# Patient Record
Sex: MALE | Employment: OTHER | URBAN - METROPOLITAN AREA
[De-identification: names, ages, dates, MRNs, and addresses within clinical notes are randomized per-mention and may not be internally consistent; named-entity substitution may affect disease eponyms.]

---

## 2017-01-10 DIAGNOSIS — I10 HYPERTENSION, ESSENTIAL, BENIGN: ICD-10-CM

## 2017-01-10 RX ORDER — METOPROLOL SUCCINATE 100 MG/1
100 TABLET, EXTENDED RELEASE ORAL
Qty: 30 TAB | Refills: 11 | Status: SHIPPED | OUTPATIENT
Start: 2017-01-10 | End: 2017-04-17 | Stop reason: ALTCHOICE

## 2017-01-10 NOTE — TELEPHONE ENCOUNTER
Per Gay:  Dr. Leesa Humphreys Refill  Received: Today       [address] [facility] Office Pool                     Patient needs a refill on his Metoprolol called to [address], [phone].  Contact is 21 [phone]

## 2017-03-03 ENCOUNTER — HOSPITAL ENCOUNTER (OUTPATIENT)
Dept: LAB | Age: 77
Discharge: HOME | End: 2017-03-03
Payer: MEDICARE

## 2017-03-03 ENCOUNTER — APPOINTMENT (OUTPATIENT)
Dept: INTERNAL MEDICINE CLINIC | Age: 77
End: 2017-03-03

## 2017-03-03 DIAGNOSIS — E78.2 HYPERLIPIDEMIA, MIXED: ICD-10-CM

## 2017-03-03 PROCEDURE — 80061 LIPID PANEL: CPT

## 2017-03-03 PROCEDURE — 36415 COLL VENOUS BLD VENIPUNCTURE: CPT

## 2017-03-03 PROCEDURE — 80053 COMPREHEN METABOLIC PANEL: CPT

## 2017-03-04 LAB
ALBUMIN SERPL-MCNC: 4.3 G/DL (ref 3.5–4.8)
ALBUMIN/GLOB SERPL: 1.9 {RATIO} (ref 1.1–2.5)
ALP SERPL-CCNC: 67 IU/L (ref 39–117)
ALT SERPL-CCNC: 28 IU/L (ref 0–44)
AST SERPL-CCNC: 27 IU/L (ref 0–40)
BILIRUB SERPL-MCNC: 0.9 MG/DL (ref 0–1.2)
BUN SERPL-MCNC: 12 MG/DL (ref 8–27)
BUN/CREAT SERPL: 11 (ref 10–22)
CALCIUM SERPL-MCNC: 9.3 MG/DL (ref 8.6–10.2)
CHLORIDE SERPL-SCNC: 101 MMOL/L (ref 96–106)
CHOLEST SERPL-MCNC: 143 MG/DL (ref 100–199)
CO2 SERPL-SCNC: 25 MMOL/L (ref 18–29)
CREAT SERPL-MCNC: 1.09 MG/DL (ref 0.76–1.27)
GLOBULIN SER CALC-MCNC: 2.3 G/DL (ref 1.5–4.5)
GLUCOSE SERPL-MCNC: 105 MG/DL (ref 65–99)
HDLC SERPL-MCNC: 49 MG/DL
INTERPRETATION, 910389: NORMAL
LDLC SERPL CALC-MCNC: 69 MG/DL (ref 0–99)
POTASSIUM SERPL-SCNC: 4 MMOL/L (ref 3.5–5.2)
PROT SERPL-MCNC: 6.6 G/DL (ref 6–8.5)
SODIUM SERPL-SCNC: 141 MMOL/L (ref 134–144)
TRIGL SERPL-MCNC: 127 MG/DL (ref 0–149)
VLDLC SERPL CALC-MCNC: 25 MG/DL (ref 5–40)

## 2017-03-09 PROBLEM — R73.02 GLUCOSE INTOLERANCE (IMPAIRED GLUCOSE TOLERANCE): Status: ACTIVE | Noted: 2017-03-09

## 2017-03-10 ENCOUNTER — OFFICE VISIT (OUTPATIENT)
Dept: INTERNAL MEDICINE CLINIC | Age: 77
End: 2017-03-10

## 2017-03-10 VITALS
TEMPERATURE: 98.7 F | OXYGEN SATURATION: 96 % | BODY MASS INDEX: 31.52 KG/M2 | HEIGHT: 68 IN | RESPIRATION RATE: 14 BRPM | WEIGHT: 208 LBS | SYSTOLIC BLOOD PRESSURE: 130 MMHG | HEART RATE: 65 BPM | DIASTOLIC BLOOD PRESSURE: 73 MMHG

## 2017-03-10 DIAGNOSIS — N18.30 BENIGN HYPERTENSION WITH CHRONIC KIDNEY DISEASE, STAGE III (HCC): Primary | ICD-10-CM

## 2017-03-10 DIAGNOSIS — I25.10 CORONARY ARTERY DISEASE INVOLVING NATIVE CORONARY ARTERY OF NATIVE HEART WITHOUT ANGINA PECTORIS: ICD-10-CM

## 2017-03-10 DIAGNOSIS — K21.9 GASTROESOPHAGEAL REFLUX DISEASE WITHOUT ESOPHAGITIS: ICD-10-CM

## 2017-03-10 DIAGNOSIS — Z71.89 ADVANCE DIRECTIVE DISCUSSED WITH PATIENT: ICD-10-CM

## 2017-03-10 DIAGNOSIS — R73.02 GLUCOSE INTOLERANCE (IMPAIRED GLUCOSE TOLERANCE): ICD-10-CM

## 2017-03-10 DIAGNOSIS — E78.2 HYPERLIPIDEMIA, MIXED: ICD-10-CM

## 2017-03-10 DIAGNOSIS — G47.62 NOCTURNAL LEG CRAMPS: ICD-10-CM

## 2017-03-10 DIAGNOSIS — I12.9 BENIGN HYPERTENSION WITH CHRONIC KIDNEY DISEASE, STAGE III (HCC): Primary | ICD-10-CM

## 2017-03-10 DIAGNOSIS — Z00.00 MEDICARE ANNUAL WELLNESS VISIT, SUBSEQUENT: ICD-10-CM

## 2017-03-10 DIAGNOSIS — M48.062 LUMBAR STENOSIS WITH NEUROGENIC CLAUDICATION: ICD-10-CM

## 2017-03-10 RX ORDER — RANITIDINE 150 MG/1
150 TABLET, FILM COATED ORAL 2 TIMES DAILY
Qty: 60 TAB | Refills: 5 | Status: SHIPPED | OUTPATIENT
Start: 2017-03-10 | End: 2017-05-25

## 2017-03-10 RX ORDER — GABAPENTIN 300 MG/1
CAPSULE ORAL
Refills: 0 | COMMUNITY
Start: 2017-02-15 | End: 2017-03-10 | Stop reason: SDUPTHER

## 2017-03-10 RX ORDER — GABAPENTIN 300 MG/1
300 CAPSULE ORAL 3 TIMES DAILY
Qty: 90 CAP | Refills: 5 | Status: SHIPPED | OUTPATIENT
Start: 2017-03-10 | End: 2018-07-12

## 2017-03-10 RX ORDER — GUAIFENESIN 100 MG/5ML
81 LIQUID (ML) ORAL
Qty: 90 TAB | Refills: 3
Start: 2017-03-10 | End: 2022-07-12

## 2017-03-10 NOTE — PATIENT INSTRUCTIONS
Increase gabapentin to twice a day for 5 days, then 3 times a day. We can increase further if needed. Try to stretch 3 times a day as per illustration   Take Vitamin B6 30 mg 3 times a day for leg cramps  Start ranitidine 150 mg twice daily for nausea and hearburn  Return in one month          Gastroesophageal Reflux Disease (GERD): Care Instructions  Your Care Instructions    Gastroesophageal reflux disease (GERD) is the backward flow of stomach acid into the esophagus. The esophagus is the tube that leads from your throat to your stomach. A one-way valve prevents the stomach acid from moving up into this tube. When you have GERD, this valve does not close tightly enough. If you have mild GERD symptoms including heartburn, you may be able to control the problem with antacids or over-the-counter medicine. Changing your diet, losing weight, and making other lifestyle changes can also help reduce symptoms. Follow-up care is a key part of your treatment and safety. Be sure to make and go to all appointments, and call your doctor if you are having problems. Its also a good idea to know your test results and keep a list of the medicines you take. How can you care for yourself at home? · Take your medicines exactly as prescribed. Call your doctor if you think you are having a problem with your medicine. · Your doctor may recommend over-the-counter medicine. For mild or occasional indigestion, antacids, such as Tums, Gaviscon, Mylanta, or Maalox, may help. Your doctor also may recommend over-the-counter acid reducers, such as Pepcid AC, Tagamet HB, Zantac 75, or Prilosec. Read and follow all instructions on the label. If you use these medicines often, talk with your doctor. · Change your eating habits. ¨ Its best to eat several small meals instead of two or three large meals. ¨ After you eat, wait 2 to 3 hours before you lie down. ¨ Chocolate, mint, and alcohol can make GERD worse.   ¨ Spicy foods, foods that have a lot of acid (like tomatoes and oranges), and coffee can make GERD symptoms worse in some people. If your symptoms are worse after you eat a certain food, you may want to stop eating that food to see if your symptoms get better. · Do not smoke or chew tobacco. Smoking can make GERD worse. If you need help quitting, talk to your doctor about stop-smoking programs and medicines. These can increase your chances of quitting for good. · If you have GERD symptoms at night, raise the head of your bed 6 to 8 inches by putting the frame on blocks or placing a foam wedge under the head of your mattress. (Adding extra pillows does not work.)  · Do not wear tight clothing around your middle. · Lose weight if you need to. Losing just 5 to 10 pounds can help. When should you call for help? Call your doctor now or seek immediate medical care if:  · You have new or different belly pain. · Your stools are black and tarlike or have streaks of blood. Watch closely for changes in your health, and be sure to contact your doctor if:  · Your symptoms have not improved after 2 days. · Food seems to catch in your throat or chest.  Where can you learn more? Go to http://bridget-primo.info/. Enter E257 in the search box to learn more about \"Gastroesophageal Reflux Disease (GERD): Care Instructions. \"  Current as of: August 9, 2016  Content Version: 11.1  © 1246-1266 [employer]. Care instructions adapted under license by [employer] (which disclaims liability or warranty for this information). If you have questions about a medical condition or this instruction, always ask your healthcare professional. [address] any warranty or liability for your use of this information.

## 2017-03-10 NOTE — PROGRESS NOTES
This is a Subsequent Medicare Annual Wellness Visit providing Personalized Prevention Plan Services (PPPS) (Performed 12 months after initial AWV and PPPS )    I have reviewed the patient's medical history in detail and updated the computerized patient record. History     Past Medical History:   Diagnosis Date    Arrhythmia     atrial fibrillation    Arthritis     CAD (coronary artery disease) 12/24/2013    Cataract     Diverticulosis 9/22/2009    Dyslipidemia 9/22/2009    Heart failure ([address] 12/2013    MILD HEART ATTACK     Hemorrhoids 9/22/2009    HTN 9/22/2009    Hypercholesterolemia     Impotence 9/22/2009    Neuropathy 9/22/2009    Open angle glaucoma with borderline intraocular pressure     Vitamin D deficiency 9/22/2009      Past Surgical History:   Procedure Laterality Date    CARDIAC SURG PROCEDURE UNLIST 2013    triple bypass december 2013    ENDOSCOPY, COLON, DIAGNOSTIC  [id number]    HX HERNIA REPAIR      HX LITHOTRIPSY  [id number]    HX ORTHOPAEDIC      back surgery     Current Outpatient Prescriptions   Medication Sig Dispense Refill    aspirin 81 mg chewable tablet Take 1 Tab by mouth nightly. 90 Tab 3    gabapentin (NEURONTIN) 300 mg capsule Take 1 Cap by mouth three (3) times daily. 90 Cap 5    raNITIdine (ZANTAC) 150 mg tablet Take 1 Tab by mouth two (2) times a day. 60 Tab 5    metoprolol succinate (TOPROL-XL) 100 mg tablet Take 1 Tab by mouth nightly. 30 Tab 11    amLODIPine (NORVASC) 5 mg tablet Take 1 Tab by mouth daily. Indications: Hypertension 30 Tab 11    cetirizine (ZYRTEC) 10 mg tablet Take 1 Tab by mouth daily. 20 Tab 0    cholecalciferol (VITAMIN D3) 1,000 unit cap Take  by mouth nightly.       lisinopril-hydrochlorothiazide (PRINZIDE, ZESTORETIC) 20-12.5 mg per tablet TAKE TWO TABLETS ONCE DAILY (Patient taking differently: TAKE TWO TABLETS ONCE DAILY AT HS) 60 Tab 11    atorvastatin (LIPITOR) 80 mg tablet TAKE ONE TABLET ONCE DAILY (Patient taking differently: TAKE ONE TABLET ONCE DAILY AT HS) 30 Tab 11    acetaminophen (TYLENOL EXTRA STRENGTH) 500 mg tablet Take  by mouth every six (6) hours as needed for Pain. Allergies   Allergen Reactions    Percocet [Oxycodone-Acetaminophen] Other (comments)     \"feel weird\"     Family History   Problem Relation Age of Onset    Hypertension Mother     Diabetes Mother     Cancer Father      LIVER    Alcohol abuse Brother     Diabetes Sister     Hypertension Sister     Arthritis-osteo Sister     Kidney Disease Sister      DIALYSIS    Alcohol abuse Brother     Suicide Brother     No Known Problems Brother     No Known Problems Brother     No Known Problems Brother     Arthritis-osteo Brother     Hypertension Son     Hypertension Son     Anesth Problems Neg Hx      Social History   Substance Use Topics    Smoking status: Never Smoker    Smokeless tobacco: Never Used    Alcohol use No     Patient Active Problem List   Diagnosis Code    Hyperlipidemia, mixed E78.2    Impotence N52.9    Diverticulosis K57.90    Hemorrhoids K64.9    Vitamin D deficiency E55.9    Benign hypertension with chronic kidney disease, stage III I12.9, N18.3    GERD (gastroesophageal reflux disease) K21.9    Intermittent angle-closure glaucoma H40.239    CAD (coronary artery disease), native coronary artery I25.10    Nephrolithiasis N20.0    Left median nerve neuropathy G56.12    CKD (chronic kidney disease) stage 3, GFR 30-59 ml/min N18.3    Advance directive discussed with patient Z71.89    Lumbar stenosis with neurogenic claudication M48.06    Unequal blood pressure in upper extremities R09.89    Glucose intolerance (impaired glucose tolerance) R73.02       Depression Risk Factor Screening:     PHQ 2 / 9, over the last two weeks 9/9/2016   Little interest or pleasure in doing things Not at all   Feeling down, depressed or hopeless Not at all   Total Score PHQ 2 0     Alcohol Risk Factor Screening:    On any occasion during the past 3 months, have you had more than 4 drinks containing alcohol? No    Do you average more than 14 drinks per week? No      Functional Ability and Level of Safety:     Hearing Loss   normal-to-mild    Activities of Daily Living   Self-care. Requires assistance with: no ADLs    Fall Risk     Fall Risk Assessment, last 12 mths 9/9/2016   Able to walk? Yes   Fall in past 12 months? Yes   Fall with injury? No   Number of falls in past 12 months 2   Fall Risk Score 2     Abuse Screen   Patient is not abused    Review of Systems   Not required    Physical Examination     Evaluation of Cognitive Function:  Mood/affect:  neutral, happy  Appearance: age appropriate, casually dressed and within normal Limits  Family member/caregiver input: none  Cognition and memory appear normal.       Patient Care Team:  Ivonne Rosenthal MD as PCP - General (Internal Medicine)  Yin Floyd RN as Nurse Navigator    Advice/Referrals/Counseling   Education and counseling provided:  End-of-Life planning (with patient's consent)      Assessment/Plan   See other note this date.

## 2017-03-10 NOTE — PROGRESS NOTES
HISTORY OF PRESENT ILLNESS  Donna Terry is a 67 y.o. male. HPI  He presents for follow up of hypertension, hyperlipidemia, coronary artery disease, history of prior MI, status post CABG, chronic kidney disease and glucose intolerance. Diet and Lifestyle: generally follows a low fat low cholesterol diet, generally follows a low sodium diet, does not rigorously follow a diabetic diet, sedentary, nonsmoker   Medication compliance: compliant most of the time  Medication side effects: none  Home BP Monitoring: is not measured at home. Cardiovascular ROS:  He complains of claudication. He denies palpitations, orthopnea, exertional chest pressure/discomfort, claudication, lower extremity edema, dyspnea on exertion, dizziness     Sense of spinning or off balance associated  with nausea triggered by posiiton change. Each episode lasst 2-3 minutes. He complains of gastroesophageal reflux. He is currently not treating this. Current symptoms include heartburn and regurgitation of undigested food. He denies dysphagia, has not lost weight. Back pain pain into both thighs which is described as aching. I occurs when he is up on feet. At worst it is  5/10 intensity. It is relieved by sitting. The severe pain radiating into leg is improved since surgery. He is disappointed that he still has pain. Gabapentin once a day so far not helping after taking it for a month.      Patient Active Problem List   Diagnosis Code    Hyperlipidemia, mixed E78.2    Impotence N52.9    Diverticulosis K57.90    Hemorrhoids K64.9    Vitamin D deficiency E55.9    Benign hypertension with chronic kidney disease, stage III I12.9, N18.3    GERD (gastroesophageal reflux disease) K21.9    Intermittent angle-closure glaucoma H40.239    CAD (coronary artery disease), native coronary artery I25.10    Nephrolithiasis N20.0    Left median nerve neuropathy G56.12    CKD (chronic kidney disease) stage 3, GFR 30-59 ml/min N18.3    Advance directive discussed with patient Z71.89    Lumbar stenosis with neurogenic claudication M48.06    Unequal blood pressure in upper extremities R09.89    Glucose intolerance (impaired glucose tolerance) R73.02     Past Medical History:   Diagnosis Date    Arrhythmia     atrial fibrillation    Arthritis     CAD (coronary artery disease) 12/24/2013    Cataract     Diverticulosis 9/22/2009    Dyslipidemia 9/22/2009    Heart failure ([address] 12/2013    MILD HEART ATTACK     Hemorrhoids 9/22/2009    HTN 9/22/2009    Hypercholesterolemia     Impotence 9/22/2009    Neuropathy 9/22/2009    Open angle glaucoma with borderline intraocular pressure     Vitamin D deficiency 9/22/2009     Past Surgical History:   Procedure Laterality Date    CARDIAC SURG PROCEDURE UNLIST 2013    triple bypass december 2013    ENDOSCOPY, COLON, DIAGNOSTIC  [id number]    HX HERNIA REPAIR      HX LITHOTRIPSY  [id number]    HX ORTHOPAEDIC      back surgery     Social History     Social History    Marital status:      Spouse name: N/A    Number of children: N/A    Years of education: N/A     Social History Main Topics    Smoking status: Never Smoker    Smokeless tobacco: Never Used    Alcohol use No    Drug use: No    Sexual activity: Yes     Partners: Female     Other Topics Concern    None     Social History Narrative     Family History   Problem Relation Age of Onset    Hypertension Mother     Diabetes Mother     Cancer Father      LIVER    Alcohol abuse Brother     Diabetes Sister     Hypertension Sister     Arthritis-osteo Sister     Kidney Disease Sister      DIALYSIS    Alcohol abuse Brother     Suicide Brother     No Known Problems Brother     No Known Problems Brother     No Known Problems Brother     Arthritis-osteo Brother     Hypertension Son    [facility] Hypertension Son     Anesth Problems Neg Hx      Allergies   Allergen Reactions    Percocet [Oxycodone-Acetaminophen] Other (comments) \"feel weird\"     Current Outpatient Prescriptions   Medication Sig Dispense Refill    gabapentin (NEURONTIN) 300 mg capsule TAKE 1 TO 2 CAPSULES BY MOUTH AT BEDTIME  0    metoprolol succinate (TOPROL-XL) 100 mg tablet Take 1 Tab by mouth nightly. 30 Tab 11    amLODIPine (NORVASC) 5 mg tablet Take 1 Tab by mouth daily. Indications: Hypertension 30 Tab 11    cetirizine (ZYRTEC) 10 mg tablet Take 1 Tab by mouth daily. 20 Tab 0    cholecalciferol (VITAMIN D3) 1,000 unit cap Take  by mouth nightly.  lisinopril-hydrochlorothiazide (PRINZIDE, ZESTORETIC) 20-12.5 mg per tablet TAKE TWO TABLETS ONCE DAILY (Patient taking differently: TAKE TWO TABLETS ONCE DAILY AT HS) 60 Tab 11    atorvastatin (LIPITOR) 80 mg tablet TAKE ONE TABLET ONCE DAILY (Patient taking differently: TAKE ONE TABLET ONCE DAILY AT HS) 30 Tab 11    acetaminophen (TYLENOL EXTRA STRENGTH) 500 mg tablet Take  by mouth every six (6) hours as needed for Pain.  aspirin 81 mg chewable tablet Take 81 mg by mouth nightly. Review of Systems   Constitutional: Negative for malaise/fatigue and weight loss. Gastrointestinal: Positive for heartburn. Negative for constipation and diarrhea. Musculoskeletal: Negative for back pain and joint pain. Neurological: Negative for dizziness, tingling and focal weakness. Visit Vitals    /73 (BP 1 Location: Left arm, BP Patient Position: Sitting)    Pulse 65    Temp 98.7 °F (37.1 °C) (Oral)    Resp 14    Ht 5' 8\" (1.727 m)    Wt 208 lb (94.3 kg)    SpO2 96%    BMI 31.63 kg/m2     Physical Exam   Constitutional: He is oriented to person, place, and time. He appears well-developed and well-nourished. HENT:   Head: Normocephalic and atraumatic. Eyes: Conjunctivae are normal. Pupils are equal, round, and reactive to light. Neck: Neck supple. Carotid bruit is not present. No thyromegaly present. Cardiovascular: Normal rate, regular rhythm and normal heart sounds.   PMI is not displaced. Exam reveals no gallop. No murmur heard. Pulses:       Dorsalis pedis pulses are 2+ on the right side, and 2+ on the left side. Posterior tibial pulses are 2+ on the right side, and 2+ on the left side. Pulmonary/Chest: Effort normal. He has no wheezes. He has no rhonchi. He has no rales. Abdominal: Soft. Normal appearance. He exhibits no abdominal bruit and no mass. There is no hepatosplenomegaly. There is no tenderness. Musculoskeletal: He exhibits edema (trace bilaterally). Negative SLR bilaterally   Lymphadenopathy:     He has no cervical adenopathy. Right: No supraclavicular adenopathy present. Left: No inguinal and no supraclavicular adenopathy present. Neurological: He is alert and oriented to person, place, and time. No sensory deficit. Skin: Skin is warm, dry and intact. No rash noted. Psychiatric: He has a normal mood and affect. His behavior is normal.   Nursing note and vitals reviewed. Results for orders placed or performed in visit on 41/25/76   METABOLIC PANEL, COMPREHENSIVE   Result Value Ref Range    Glucose 105 (H) 65 - 99 mg/dL    BUN 12 8 - 27 mg/dL    Creatinine 1.09 0.76 - 1.27 mg/dL    GFR est non-AA 65 >59 mL/min/1.73    GFR est AA 75 >59 mL/min/1.73    BUN/Creatinine ratio 11 10 - 22    Sodium 141 134 - 144 mmol/L    Potassium 4.0 3.5 - 5.2 mmol/L    Chloride 101 96 - 106 mmol/L    CO2 25 18 - 29 mmol/L    Calcium 9.3 8.6 - 10.2 mg/dL    Protein, total 6.6 6.0 - 8.5 g/dL    Albumin 4.3 3.5 - 4.8 g/dL    GLOBULIN, TOTAL 2.3 1.5 - 4.5 g/dL    A-G Ratio 1.9 1.1 - 2.5    Bilirubin, total 0.9 0.0 - 1.2 mg/dL    Alk.  phosphatase 67 39 - 117 IU/L    AST (SGOT) 27 0 - 40 IU/L    ALT (SGPT) 28 0 - 44 IU/L   LIPID PANEL   Result Value Ref Range    Cholesterol, total 143 100 - 199 mg/dL    Triglyceride 127 0 - 149 mg/dL    HDL Cholesterol 49 >39 mg/dL    VLDL, calculated 25 5 - 40 mg/dL    LDL, calculated 69 0 - 99 mg/dL   CVD REPORT   Result Value Ref Range    INTERPRETATION Note        ASSESSMENT and PLAN    ICD-10-CM ICD-9-CM    1. Benign hypertension with chronic kidney disease, stage III I12.9 403.10     N18.3 585.3    2. Hyperlipidemia, mixed E78.2 272.2    3. Gastroesophageal reflux disease without esophagitis K21.9 530.81    4. Coronary artery disease involving native coronary artery of native heart without angina pectoris I25.10 414.01 aspirin 81 mg chewable tablet   5. Glucose intolerance (impaired glucose tolerance) R73.02 790.22    6. Lumbar stenosis with neurogenic claudication M48.06 724.03 gabapentin (NEURONTIN) 300 mg capsule   7. Nocturnal leg cramps G47.62 327.52    8. Medicare annual wellness visit, subsequent Z00.00 V70.0    9. Advance directive discussed with patient Z71.89 V65.49        Benign hypertension with chronic kidney disease, stage III  Blood pressure is controlled and creatinine is stable. Hyperlipidemia, mixed  Hyperlipidemia is controlled    Gastroesophageal reflux disease without esophagitis         -     raNITIdine (ZANTAC) 150 mg tablet; Take 1 Tab by mouth two (2)        times a day. Coronary artery disease involving native coronary artery of native heart without angina pectoris  -     aspirin 81 mg chewable tablet; Take 1 Tab by mouth nightly. Glucose intolerance (impaired glucose tolerance)    Lumbar stenosis with neurogenic claudication  -     gabapentin (NEURONTIN) 300 mg capsule; Take 1 Cap by mouth three (3) times daily. Nocturnal leg cramps  Try to stretch 3 times a day as per instructions  Take Vitamin B6 30 mg 3 times a day for leg cramps          Medicare annual wellness visit, subsequent    Advance directive discussed with patient        Follow-up Disposition:  Return in about 1 month (around 4/10/2017) for GERD, back pain, nausea/dizziness.   lab results and schedule of future lab studies reviewed with patient  reviewed diet, exercise and weight control  cardiovascular risk and specific lipid/LDL goals reviewed  Discussed the patient's BMI with him. The BMI follow up plan is as follows: I have counseled this patient on diet and exercise regimens  I have discussed the diagnosis with the patient and the intended plan as seen in the above orders. Patient is in agreement. The patient has received an after-visit summary and questions were answered concerning future plans. I have discussed medication side effects and warnings with the patient as well.

## 2017-03-10 NOTE — PROGRESS NOTES
Reviewed record In preparation for visit and have obtained necessary documentation. Has info on advanced directive but has not filled them out. 1. Have you been to the ER, urgent care clinic or hospitalized since your last visit? [facility] 9/9/16, [facility] ER 11/15/16    2. Have you seen or consulted any other health care providers outside of the [address] [place] [address] since your last visit? Include any pap smears or colon screening.  Neurosurgeon     [facility] Maintenance reviewed:

## 2017-03-10 NOTE — MR AVS SNAPSHOT
Visit Information Date & Time Provider Department Dept. Phone Encounter #  
 3/10/2017  8:45 AM Ivonne Rosenthal MD [address] [place] [address] [phone] [id number] Follow-up Instructions Return in about 1 month (around 4/10/2017) for GERD, back pain, nausea/dizziness. Upcoming Health Maintenance Date Due DTaP/Tdap/Td series (1 - Tdap) 9/6/1960 ZOSTER VACCINE AGE 60> 9/6/1999 MEDICARE YEARLY EXAM 1/14/2017 GLAUCOMA SCREENING Q2Y 6/26/2017 COLONOSCOPY 1/1/2018 Allergies as of 3/10/2017  Review Complete On: 3/10/2017 By: Ivonne Rosenthal MD  
  
 Severity Noted Reaction Type Reactions Percocet [Oxycodone-acetaminophen]  09/22/2009    Other (comments) \"feel weird\" Current Immunizations  Reviewed on 3/10/2017 Name Date Influenza High Dose Vaccine PF 9/14/2015 Influenza Vaccine (Quad) PF 9/9/2016 Pneumococcal Conjugate (PCV-13) 3/12/2015 Pneumococcal Vaccine (Unspecified Type) 4/22/2010 Reviewed by Kenneth Ormond, LPN on 9/62/7754 at  8:49 AM  
You Were Diagnosed With   
  
 Codes Comments Benign hypertension with chronic kidney disease, stage III    -  Primary ICD-10-CM: I12.9, N18.3 ICD-9-CM: 403.10, 585.3 Hyperlipidemia, mixed     ICD-10-CM: E78.2 ICD-9-CM: 272.2 Coronary artery disease involving native coronary artery of native heart without angina pectoris     ICD-10-CM: I25.10 ICD-9-CM: 414.01 Glucose intolerance (impaired glucose tolerance)     ICD-10-CM: R73.02 
ICD-9-CM: 790.22 Lumbar stenosis with neurogenic claudication     ICD-10-CM: M48.06 
ICD-9-CM: 724.03 Nocturnal leg cramps     ICD-10-CM: H36.08 
ICD-9-CM: 327.52 Gastroesophageal reflux disease without esophagitis     ICD-10-CM: K21.9 ICD-9-CM: 530.81 Medicare annual wellness visit, subsequent     ICD-10-CM: Z00.00 ICD-9-CM: V70.0 Advance directive discussed with patient     ICD-10-CM: Z71.89 ICD-9-CM: V65.49 Vitals BP Pulse Temp Resp Height(growth percentile) Weight(growth percentile) 130/73 (BP 1 Location: Left arm, BP Patient Position: Sitting) 65 98.7 °F (37.1 °C) (Oral) 14 5' 8\" (1.727 m) 208 lb (94.3 kg) SpO2 BMI Smoking Status 96% 31.63 kg/m2 Never Smoker BMI and BSA Data Body Mass Index Body Surface Area  
 31.63 kg/m 2 2.13 m 2 Preferred Pharmacy Pharmacy Name Phone [facility] - Radha GIRARD [phone] Your Updated Medication List  
  
   
This list is accurate as of: 3/10/17  9:38 AM.  Always use your most recent med list. amLODIPine 5 mg tablet Commonly known as:  Rudene Post Take 1 Tab by mouth daily. Indications: Hypertension  
  
 aspirin 81 mg chewable tablet Take 1 Tab by mouth nightly. atorvastatin 80 mg tablet Commonly known as:  LIPITOR  
TAKE ONE TABLET ONCE DAILY  
  
 cetirizine 10 mg tablet Commonly known as:  ZyrTEC Take 1 Tab by mouth daily. cholecalciferol 1,000 unit Cap Commonly known as:  VITAMIN D3 Take  by mouth nightly.  
  
 gabapentin 300 mg capsule Commonly known as:  NEURONTIN Take 1 Cap by mouth three (3) times daily. lisinopril-hydroCHLOROthiazide 20-12.5 mg per tablet Commonly known as:  PRINZIDE, ZESTORETIC  
TAKE TWO TABLETS ONCE DAILY  
  
 metoprolol succinate 100 mg tablet Commonly known as:  TOPROL-XL Take 1 Tab by mouth nightly. raNITIdine 150 mg tablet Commonly known as:  ZANTAC Take 1 Tab by mouth two (2) times a day. TYLENOL EXTRA STRENGTH 500 mg tablet Generic drug:  acetaminophen Take  by mouth every six (6) hours as needed for Pain. Prescriptions Sent to Pharmacy Refills  
 gabapentin (NEURONTIN) 300 mg capsule 5 Sig: Take 1 Cap by mouth three (3) times daily. Class: Normal  
 Pharmacy: [address] [place] [address], Radha Rene Ph #: [phone] Route: Oral  
 raNITIdine (ZANTAC) 150 mg tablet 5 Sig: Take 1 Tab by mouth two (2) times a day. Class: Normal  
 Pharmacy: [address] [place] [address] Adriano Rene  #: [phone] Route: Oral  
  
Follow-up Instructions Return in about 1 month (around 4/10/2017) for GERD, back pain, nausea/dizziness. Patient Instructions Increase gabapentin to twice a day for 5 days, then 3 times a day. We can increase further if needed. Try to stretch 3 times a day as per illustration Take Vitamin B6 30 mg 3 times a day for leg cramps Start ranitidine 150 mg twice daily for nausea and hearburn Return in one month Gastroesophageal Reflux Disease (GERD): Care Instructions Your Care Instructions Gastroesophageal reflux disease (GERD) is the backward flow of stomach acid into the esophagus. The esophagus is the tube that leads from your throat to your stomach. A one-way valve prevents the stomach acid from moving up into this tube. When you have GERD, this valve does not close tightly enough. If you have mild GERD symptoms including heartburn, you may be able to control the problem with antacids or over-the-counter medicine. Changing your diet, losing weight, and making other lifestyle changes can also help reduce symptoms. Follow-up care is a key part of your treatment and safety. Be sure to make and go to all appointments, and call your doctor if you are having problems. Its also a good idea to know your test results and keep a list of the medicines you take. How can you care for yourself at home? · Take your medicines exactly as prescribed. Call your doctor if you think you are having a problem with your medicine. · Your doctor may recommend over-the-counter medicine. For mild or occasional indigestion, antacids, such as Tums, Gaviscon, Mylanta, or Maalox, may help.  Your doctor also may recommend over-the-counter acid reducers, such as Pepcid AC, Tagamet HB, Zantac 75, or Prilosec. Read and follow all instructions on the label. If you use these medicines often, talk with your doctor. · Change your eating habits. ¨ Its best to eat several small meals instead of two or three large meals. ¨ After you eat, wait 2 to 3 hours before you lie down. ¨ Chocolate, mint, and alcohol can make GERD worse. ¨ Spicy foods, foods that have a lot of acid (like tomatoes and oranges), and coffee can make GERD symptoms worse in some people. If your symptoms are worse after you eat a certain food, you may want to stop eating that food to see if your symptoms get better. · Do not smoke or chew tobacco. Smoking can make GERD worse. If you need help quitting, talk to your doctor about stop-smoking programs and medicines. These can increase your chances of quitting for good. · If you have GERD symptoms at night, raise the head of your bed 6 to 8 inches by putting the frame on blocks or placing a foam wedge under the head of your mattress. (Adding extra pillows does not work.) · Do not wear tight clothing around your middle. · Lose weight if you need to. Losing just 5 to 10 pounds can help. When should you call for help? Call your doctor now or seek immediate medical care if: 
· You have new or different belly pain. · Your stools are black and tarlike or have streaks of blood. Watch closely for changes in your health, and be sure to contact your doctor if: 
· Your symptoms have not improved after 2 days. · Food seems to catch in your throat or chest. 
Where can you learn more? Go to http://bridget-primo.info/. Enter H004 in the search box to learn more about \"Gastroesophageal Reflux Disease (GERD): Care Instructions. \" Current as of: August 9, 2016 Content Version: 11.1 © 2421-9638 [employer].  Care instructions adapted under license by [employer] (which disclaims liability or warranty for this information). If you have questions about a medical condition or this instruction, always ask your healthcare professional. [address] any warranty or liability for your use of this information. Introducing [facility] SERVICES! [employer] introduces [employer] patient portal. Now you can access parts of your medical record, email your doctor's office, and request medication refills online. 1. In your internet browser, go to [email or web address] 2. Click on the First Time User? Click Here link in the Sign In box. You will see the New Member Sign Up page. 3. Enter your [employer] Access Code exactly as it appears below. You will not need to use this code after youve completed the sign-up process. If you do not sign up before the expiration date, you must request a new code. · [employer] Access Code: [email or web address] Expires: 6/8/2017  9:31 AM 
 
4. Enter the last four digits of your Social Security Number (xxxx) and Date of Birth (mm/dd/yyyy) as indicated and click Submit. You will be taken to the next sign-up page. 5. Create a [employer] ID. This will be your [employer] login ID and cannot be changed, so think of one that is secure and easy to remember. 6. Create a [employer] password. You can change your password at any time. 7. Enter your Password Reset Question and Answer. This can be used at a later time if you forget your password. 8. Enter your e-mail address. You will receive e-mail notification when new information is available in [address] 9. Click Sign Up. You can now view and download portions of your medical record. 10. Click the Download Summary menu link to download a portable copy of your medical information. If you have questions, please visit the Frequently Asked Questions section of the [employer] website. Remember, [employer] is NOT to be used for urgent needs. For medical emergencies, dial 911. Now available from your iPhone and Android! Please provide this summary of care documentation to your next provider. Your primary care clinician is listed as Radha Weber. If you have any questions after today's visit, please call [phone].

## 2017-04-17 ENCOUNTER — OFFICE VISIT (OUTPATIENT)
Dept: INTERNAL MEDICINE CLINIC | Age: 77
End: 2017-04-17

## 2017-04-17 VITALS
DIASTOLIC BLOOD PRESSURE: 82 MMHG | HEIGHT: 68 IN | HEART RATE: 56 BPM | WEIGHT: 210.6 LBS | SYSTOLIC BLOOD PRESSURE: 144 MMHG | TEMPERATURE: 97.7 F | BODY MASS INDEX: 31.92 KG/M2 | OXYGEN SATURATION: 96 % | RESPIRATION RATE: 18 BRPM

## 2017-04-17 DIAGNOSIS — I12.9 BENIGN HYPERTENSION WITH CHRONIC KIDNEY DISEASE, STAGE III (HCC): ICD-10-CM

## 2017-04-17 DIAGNOSIS — R73.02 GLUCOSE INTOLERANCE (IMPAIRED GLUCOSE TOLERANCE): Primary | ICD-10-CM

## 2017-04-17 DIAGNOSIS — K21.9 GASTROESOPHAGEAL REFLUX DISEASE WITHOUT ESOPHAGITIS: ICD-10-CM

## 2017-04-17 DIAGNOSIS — M48.062 LUMBAR STENOSIS WITH NEUROGENIC CLAUDICATION: ICD-10-CM

## 2017-04-17 DIAGNOSIS — N18.30 BENIGN HYPERTENSION WITH CHRONIC KIDNEY DISEASE, STAGE III (HCC): ICD-10-CM

## 2017-04-17 LAB — HBA1C MFR BLD HPLC: 5.5 %

## 2017-04-17 RX ORDER — DULOXETIN HYDROCHLORIDE 30 MG/1
30 CAPSULE, DELAYED RELEASE ORAL DAILY
Qty: 10 CAP | Refills: 0 | Status: SHIPPED | OUTPATIENT
Start: 2017-04-17 | End: 2017-04-20 | Stop reason: SDUPTHER

## 2017-04-17 RX ORDER — CARVEDILOL 12.5 MG/1
12.5 TABLET ORAL 2 TIMES DAILY WITH MEALS
Qty: 60 TAB | Refills: 5 | Status: SHIPPED | OUTPATIENT
Start: 2017-04-17 | End: 2017-04-20 | Stop reason: SDUPTHER

## 2017-04-17 RX ORDER — DULOXETIN HYDROCHLORIDE 60 MG/1
60 CAPSULE, DELAYED RELEASE ORAL DAILY
Qty: 30 CAP | Refills: 5 | Status: SHIPPED | OUTPATIENT
Start: 2017-04-17 | End: 2017-04-20 | Stop reason: SDUPTHER

## 2017-04-17 NOTE — PROGRESS NOTES
HISTORY OF PRESENT ILLNESS  Migel Roy is a 67 y.o. male. HPI  He presents for follow up of gastroesophageal reflux. He is currently treating this with ranitidine. He notes resolution of symptoms since he began taking it. Anna Snooks He denies dysphagia, has not lost weight, denies heartburn. He presents for presents with a several year history of low back problems. Symptoms include pain in low back radiating to both thighs, (dull in character; 10/10 in severity at worst, but there are times he has no pain, for example if sitting still). Exacerbating factors identifiable by patient are standing, walking, rolling over in bed. Alleviating factors identifiable by patient are sitting, recumbency. Treatments so far initiated by patient: Tylenol, gabapentin 300 mg 3 times a day. Previous lower back problems: has had surgery 9/2016 revision laminectomy L4-L5 and dural repair. He felt no improvement in pain after the procedure. Previous workup: MRI. Previous treatments: GARY which did not help. He presents for follow up of hypertension, hyperlipidemia, coronary artery disease, status post CABG, CKD and glucose intolerance. Diet and Lifestyle: generally follows a low sodium diet, does not rigorously follow a diabetic diet, sedentary, nonsmoker   Medication compliance: compliant all of the time  Medication side effects: none  Home BP Monitoring: is not measured at home. Cardiovascular ROS:  He complains of lower extremity edema.    He denies palpitations, exertional chest pressure/discomfort, claudication, lower extremity edema, dyspnea on exertion, dizziness    Patient Active Problem List   Diagnosis Code    Hyperlipidemia, mixed E78.2    Impotence N52.9    Diverticulosis K57.90    Hemorrhoids K64.9    Vitamin D deficiency E55.9    Benign hypertension with chronic kidney disease, stage III I12.9, N18.3    GERD (gastroesophageal reflux disease) K21.9    Intermittent angle-closure glaucoma H40.239    CAD (coronary artery disease), native coronary artery I25.10    Nephrolithiasis N20.0    Left median nerve neuropathy G56.12    CKD (chronic kidney disease) stage 3, GFR 30-59 ml/min N18.3    Advance directive discussed with patient Z71.89    Lumbar stenosis with neurogenic claudication M48.06    Unequal blood pressure in upper extremities R09.89    Glucose intolerance (impaired glucose tolerance) R73.02     Past Medical History:   Diagnosis Date    Arrhythmia     atrial fibrillation    Arthritis     CAD (coronary artery disease) 12/24/2013    Cataract     Diverticulosis 9/22/2009    Dyslipidemia 9/22/2009    Heart failure ([address] 12/2013    MILD HEART ATTACK     Hemorrhoids 9/22/2009    HTN 9/22/2009    Hypercholesterolemia     Impotence 9/22/2009    Neuropathy 9/22/2009    Open angle glaucoma with borderline intraocular pressure     Vitamin D deficiency 9/22/2009     Past Surgical History:   Procedure Laterality Date    CARDIAC SURG PROCEDURE UNLIST 2013    triple bypass december 2013    ENDOSCOPY, COLON, DIAGNOSTIC  910617    HX HERNIA REPAIR      HX LITHOTRIPSY  [id number]    HX ORTHOPAEDIC      back surgery     Social History     Social History    Marital status:      Spouse name: N/A    Number of children: N/A    Years of education: N/A     Social History Main Topics    Smoking status: Never Smoker    Smokeless tobacco: Never Used    Alcohol use No    Drug use: No    Sexual activity: Yes     Partners: Female     Other Topics Concern    None     Social History Narrative     Family History   Problem Relation Age of Onset    Hypertension Mother     Diabetes Mother     Cancer Father      LIVER    Alcohol abuse Brother     Diabetes Sister     Hypertension Sister     Arthritis-osteo Sister     Kidney Disease Sister      DIALYSIS    Alcohol abuse Brother     Suicide Brother     No Known Problems Brother     No Known Problems Brother     No Known Problems Brother     Arthritis-osteo Brother     Hypertension Son    Carlos Gehrig Hypertension Son     Anesth Problems Neg Hx      Allergies   Allergen Reactions    Percocet [Oxycodone-Acetaminophen] Other (comments)     \"feel weird\"     Current Outpatient Prescriptions   Medication Sig Dispense Refill    aspirin 81 mg chewable tablet Take 1 Tab by mouth nightly. 90 Tab 3    gabapentin (NEURONTIN) 300 mg capsule Take 1 Cap by mouth three (3) times daily. 90 Cap 5    raNITIdine (ZANTAC) 150 mg tablet Take 1 Tab by mouth two (2) times a day. 60 Tab 5    metoprolol succinate (TOPROL-XL) 100 mg tablet Take 1 Tab by mouth nightly. 30 Tab 11    amLODIPine (NORVASC) 5 mg tablet Take 1 Tab by mouth daily. Indications: Hypertension 30 Tab 11    cetirizine (ZYRTEC) 10 mg tablet Take 1 Tab by mouth daily. 20 Tab 0    cholecalciferol (VITAMIN D3) 1,000 unit cap Take  by mouth nightly.  lisinopril-hydrochlorothiazide (PRINZIDE, ZESTORETIC) 20-12.5 mg per tablet TAKE TWO TABLETS ONCE DAILY (Patient taking differently: TAKE TWO TABLETS ONCE DAILY AT HS) 60 Tab 11    atorvastatin (LIPITOR) 80 mg tablet TAKE ONE TABLET ONCE DAILY (Patient taking differently: TAKE ONE TABLET ONCE DAILY AT HS) 30 Tab 11    acetaminophen (TYLENOL EXTRA STRENGTH) 500 mg tablet Take  by mouth every six (6) hours as needed for Pain. ROS       Visit Vitals    /82 (BP 1 Location: Left arm, BP Patient Position: Sitting)    Pulse (!) 56    Temp 97.7 °F (36.5 °C) (Oral)    Resp 18    Ht 5' 8\" (1.727 m)    Wt 210 lb 9.6 oz (95.5 kg)    SpO2 96%    BMI 32.02 kg/m2     Physical Exam   Constitutional: He is oriented to person, place, and time. He appears well-developed and well-nourished. HENT:   Head: Normocephalic and atraumatic. Eyes: Conjunctivae are normal. Pupils are equal, round, and reactive to light. Neck: Neck supple. Carotid bruit is not present. No thyromegaly present.    Cardiovascular: Normal rate, regular rhythm and normal heart sounds. PMI is not displaced. Exam reveals no gallop. No murmur heard. Pulses:       Dorsalis pedis pulses are 2+ on the right side, and 2+ on the left side. Posterior tibial pulses are 2+ on the right side, and 2+ on the left side. Pulmonary/Chest: Effort normal. He has no wheezes. He has no rhonchi. He has no rales. Abdominal: Soft. Normal appearance. He exhibits no abdominal bruit and no mass. There is no hepatosplenomegaly. There is no tenderness. Musculoskeletal: He exhibits edema (trace). Lymphadenopathy:     He has no cervical adenopathy. Right: No supraclavicular adenopathy present. Left: No inguinal and no supraclavicular adenopathy present. Neurological: He is alert and oriented to person, place, and time. No sensory deficit. Skin: Skin is warm, dry and intact. No rash noted. Psychiatric: He has a normal mood and affect. His behavior is normal.   Nursing note and vitals reviewed.     Results for orders placed or performed in visit on 04/17/17   AMB POC HEMOGLOBIN A1C   Result Value Ref Range    Hemoglobin A1c (POC) 5.5 %     Lab Results   Component Value Date/Time    Cholesterol, total 143 03/03/2017 08:50 AM    HDL Cholesterol 49 03/03/2017 08:50 AM    LDL, calculated 69 03/03/2017 08:50 AM    VLDL, calculated 25 03/03/2017 08:50 AM    Triglyceride 127 03/03/2017 08:50 AM    CHOL/HDL Ratio 2.7 07/09/2010 09:43 AM     Lab Results   Component Value Date/Time    Sodium 141 03/03/2017 08:50 AM    Potassium 4.0 03/03/2017 08:50 AM    Chloride 101 03/03/2017 08:50 AM    CO2 25 03/03/2017 08:50 AM    Anion gap 7 09/20/2016 03:33 AM    Glucose 105 03/03/2017 08:50 AM    BUN 12 03/03/2017 08:50 AM    Creatinine 1.09 03/03/2017 08:50 AM    BUN/Creatinine ratio 11 03/03/2017 08:50 AM    GFR est AA 75 03/03/2017 08:50 AM    GFR est non-AA 65 03/03/2017 08:50 AM    Calcium 9.3 03/03/2017 08:50 AM    Bilirubin, total 0.9 03/03/2017 08:50 AM    AST (SGOT) 27 03/03/2017 08:50 AM Alk. phosphatase 67 03/03/2017 08:50 AM    Protein, total 6.6 03/03/2017 08:50 AM    Albumin 4.3 03/03/2017 08:50 AM    Globulin 3.9 01/27/2014 11:30 AM    A-G Ratio 1.9 03/03/2017 08:50 AM    ALT (SGPT) 28 03/03/2017 08:50 AM       ASSESSMENT and PLAN    ICD-10-CM ICD-9-CM    1. Glucose intolerance (impaired glucose tolerance) R73.02 790.22 AMB POC HEMOGLOBIN A1C   2. Benign hypertension with chronic kidney disease, stage III I12.9 403.10 carvedilol (COREG) 12.5 mg tablet    N18.3 585.3    3. Gastroesophageal reflux disease without esophagitis K21.9 530.81    4. Lumbar stenosis with neurogenic claudication M48.06 724.03 DULoxetine (CYMBALTA) 30 mg capsule      DULoxetine (CYMBALTA) 60 mg capsule         Glucose intolerance (impaired glucose tolerance)  -     AMB POC HEMOGLOBIN A1C    Benign hypertension with chronic kidney disease, stage III  Blood controlled and creatinine stable  -     carvedilol (COREG) 12.5 mg tablet; Take 1 Tab by mouth two (2) times daily (with meals). Gastroesophageal reflux disease without esophagitis  Improved    Lumbar stenosis with neurogenic claudication  -     DULoxetine (CYMBALTA) 30 mg capsule; Take 1 Cap by mouth daily. For first 10 days, then start 60 mg daily  -     DULoxetine (CYMBALTA) 60 mg capsule; Take 1 Cap by mouth daily. If this does not help, increase gabapentin (but may make swelling worse)      Follow-up Disposition:  Return in about 5 weeks (around 5/22/2017) for HTN, back pain . lab results and schedule of future lab studies reviewed with patient  reviewed diet, exercise and weight control  cardiovascular risk and specific lipid/LDL goals reviewed  Discussed the patient's BMI with her. The BMI follow up plan is as follows: I have counseled this patient on diet and exercise regimens  I have discussed the diagnosis with the patient and the intended plan as seen in the above orders. Patient is in agreement.   The patient has received an after-visit summary and questions were answered concerning future plans. I have discussed medication side effects and warnings with the patient as well.

## 2017-04-17 NOTE — MR AVS SNAPSHOT
Visit Information Date & Time Provider Department Dept. Phone Encounter #  
 4/17/2017 10:00 AM Flakita Lazaro Buddy [phone] [id number] Follow-up Instructions Return in about 5 weeks (around 5/22/2017) for HTN, back pain . Upcoming Health Maintenance Date Due DTaP/Tdap/Td series (1 - Tdap) 9/6/1960 ZOSTER VACCINE AGE 60> 9/6/1999 GLAUCOMA SCREENING Q2Y 6/26/2017 COLONOSCOPY 1/1/2018 MEDICARE YEARLY EXAM 3/11/2018 Allergies as of 4/17/2017  Review Complete On: 4/17/2017 By: Marilyn Verdugo MD  
  
 Severity Noted Reaction Type Reactions Percocet [Oxycodone-acetaminophen]  09/22/2009    Other (comments) \"feel weird\" Current Immunizations  Reviewed on 3/10/2017 Name Date Influenza High Dose Vaccine PF 9/14/2015 Influenza Vaccine (Quad) PF 9/9/2016 Pneumococcal Conjugate (PCV-13) 3/12/2015 Pneumococcal Vaccine (Unspecified Type) 4/22/2010 Not reviewed this visit You Were Diagnosed With   
  
 Codes Comments Glucose intolerance (impaired glucose tolerance)    -  Primary ICD-10-CM: R73.02 
ICD-9-CM: 790.22 Benign hypertension with chronic kidney disease, stage III     ICD-10-CM: I12.9, N18.3 ICD-9-CM: 403.10, 585.3 Gastroesophageal reflux disease without esophagitis     ICD-10-CM: K21.9 ICD-9-CM: 530.81 Lumbar stenosis with neurogenic claudication     ICD-10-CM: M48.06 
ICD-9-CM: 724.03 Vitals BP Pulse Temp Resp Height(growth percentile) Weight(growth percentile) 144/82 (BP 1 Location: Left arm, BP Patient Position: Sitting) (!) 56 97.7 °F (36.5 °C) (Oral) 18 5' 8\" (1.727 m) 210 lb 9.6 oz (95.5 kg) SpO2 BMI Smoking Status 96% 32.02 kg/m2 Never Smoker Vitals History BMI and BSA Data Body Mass Index Body Surface Area 32.02 kg/m 2 2.14 m 2 Preferred Pharmacy Pharmacy Name [facility] [place], [facility] [phone] Your Updated Medication List  
  
   
This list is accurate as of: 4/17/17 10:36 AM.  Always use your most recent med list. amLODIPine 5 mg tablet Commonly known as:  Orellana Charbel Take 1 Tab by mouth daily. Indications: Hypertension  
  
 aspirin 81 mg chewable tablet Take 1 Tab by mouth nightly. atorvastatin 80 mg tablet Commonly known as:  LIPITOR  
TAKE ONE TABLET ONCE DAILY  
  
 carvedilol 12.5 mg tablet Commonly known as:  Drema Matt Take 1 Tab by mouth two (2) times daily (with meals). cetirizine 10 mg tablet Commonly known as:  ZyrTEC Take 1 Tab by mouth daily. cholecalciferol 1,000 unit Cap Commonly known as:  VITAMIN D3 Take  by mouth nightly. * DULoxetine 30 mg capsule Commonly known as:  CYMBALTA Take 1 Cap by mouth daily. For first 10 days, then start 60 mg daily * DULoxetine 60 mg capsule Commonly known as:  CYMBALTA Take 1 Cap by mouth daily. gabapentin 300 mg capsule Commonly known as:  NEURONTIN Take 1 Cap by mouth three (3) times daily. lisinopril-hydroCHLOROthiazide 20-12.5 mg per tablet Commonly known as:  PRINZIDE, ZESTORETIC  
TAKE TWO TABLETS ONCE DAILY  
  
 raNITIdine 150 mg tablet Commonly known as:  ZANTAC Take 1 Tab by mouth two (2) times a day. * Notice: This list has 2 medication(s) that are the same as other medications prescribed for you. Read the directions carefully, and ask your doctor or other care provider to review them with you. Prescriptions Sent to Pharmacy Refills DULoxetine (CYMBALTA) 30 mg capsule 0 Sig: Take 1 Cap by mouth daily. For first 10 days, then start 60 mg daily Class: Normal  
 Pharmacy: [address] [place] [address], [facility] Ph #: [phone] Route: Oral  
 DULoxetine (CYMBALTA) 60 mg capsule 5 Sig: Take 1 Cap by mouth daily. Class: Normal  
 Pharmacy: [address] [place] [address], [facility] Ph #: [phone] Route: Oral  
 carvedilol (COREG) 12.5 mg tablet 5 Sig: Take 1 Tab by mouth two (2) times daily (with meals). Class: Normal  
 Pharmacy: [address] [place] [address], [facility] Ph #: [phone] Route: Oral  
  
We Performed the Following AMB POC HEMOGLOBIN A1C [63483 CPT(R)] Follow-up Instructions Return in about 5 weeks (around 5/22/2017) for HTN, back pain . Patient Instructions Continue gabapentin for now, may consider a higher dose Start duloxetine 30 mg daily for 10 days, then 60 mg daily Stop metoprolol and start carvedilol 12. 5 mg twice daily, which I hope will work better for blood pressure. Return in 4-6 weeks. Kidney Disease and High Blood Pressure: Care Instructions Your Care Instructions Long-term (chronic) kidney disease happens when the kidneys cannot remove waste and keep your body's fluids and chemicals in balance. Usually, the kidneys remove waste from the blood through the urine. When the kidneys are not working well, waste can build up so much that it poisons the body. Kidney disease can make you very tired. It also can cause swelling, or edema, in your legs or other areas of your body. High blood pressure is one of the major causes of chronic kidney disease. And kidney disease can also cause high blood pressure. No matter which came first, having high blood pressure damages the tiny blood vessels in the kidneys. If you have high blood pressure, it is important to lower it. There are many things you can do to lower your blood pressure, which may help slow or stop the damage to your kidneys. Follow-up care is a key part of your treatment and safety. Be sure to make and go to all appointments, and call your doctor if you are having problems.  It's also a good idea to know your test results and keep a list of the medicines you take. How can you care for yourself at home? · Be safe with medicines. Take your medicines exactly as prescribed. Call your doctor if you have any problems with your medicine. You will probably need more than one medicine to lower your blood pressure. You will get more details on the specific medicines your doctor prescribes. · Work with your doctor and a dietitian to plan meals that have the right amount of nutrients for you. You will probably have to limit salt, fluids, and protein. · Stay at a healthy weight. This is very important if you put on weight around the waist. Losing even 10 pounds can help you lower your blood pressure. · Manage other health problems such as diabetes and high cholesterol. You can help lower your risk for heart disease and blood vessel problems with a healthy lifestyle along with medicines. · Do not take ibuprofen (Advil, Motrin) or naproxen (Aleve), or similar medicines, unless your doctor tells you to. They may make chronic kidney disease worse. It is okay to take acetaminophen (Tylenol). · If your doctor recommends it, get more exercise. Walking is a good choice. Bit by bit, increase the amount you walk every day. Try for at least 30 minutes on most days of the week. You also may want to swim, bike, or do other activities. · Limit or avoid alcohol. Talk to your doctor about whether you can drink any alcohol. · Do not smoke or allow others to smoke around you. If you need help quitting, talk to your doctor about stop-smoking programs and medicines. These can increase your chances of quitting for good. When should you call for help? Call 911 anytime you think you may need emergency care. For example, call if: 
· You passed out (lost consciousness). · You have symptoms of a heart attack, such as: ¨ Chest pain or pressure. ¨ Sweating. ¨ Shortness of breath. ¨ Nausea or vomiting.  
¨ Pain that spreads from the chest to the neck, jaw, or one or both shoulders or arms. ¨ Dizziness or lightheadedness. ¨ A fast or uneven pulse. After calling 911, chew 1 adult-strength aspirin. Wait for an ambulance. Do not try to drive yourself. Call your doctor now or seek immediate medical care if: 
· You are confused or are more tired or weak than usual. 
· You bleed or have bruises. Watch closely for changes in your health, and be sure to contact your doctor if: 
· You do not want to eat. · You have new swelling of your arms or feet, or swelling that you already have gets worse. · You do not get better as expected. Where can you learn more? Go to http://bridget-primo.info/. Enter X266 in the search box to learn more about \"Kidney Disease and High Blood Pressure: Care Instructions. \" Current as of: November 20, 2015 Content Version: 11.2 © 5291-5801 Tekmi. Care instructions adapted under license by [employer] (which disclaims liability or warranty for this information). If you have questions about a medical condition or this instruction, always ask your healthcare professional. [place] [address] any warranty or liability for your use of this information. Medicines to Avoid With Kidney Disease: Care Instructions Your Care Instructions Kidney disease means that your kidneys are not able to get rid of waste from the blood. So they can't keep your body's fluids and chemicals in balance. Usually, the kidneys get rid of waste from the blood through the urine. And they balance the fluids in the body. When your kidneys don't work as they should, you have to be careful about some medicines. They may harm your kidneys. Your doctor may tell you not to take them. Or he or she may change the dose. Medicines for pain and swelling, such as ibuprofen (Advil or Motrin) or naproxen (Aleve), can cause harm. So can some antibiotics and antacids. And you need to be careful about some drugs that treat cancer, lower blood pressure, or get rid of water from the body. Some herbal products could cause harm too. Follow-up care is a key part of your treatment and safety. Be sure to make and go to all appointments, and call your doctor if you are having problems. It's also a good idea to know your test results and keep a list of the medicines you take. How can you care for yourself at home? · Tell your doctor all the prescription, herbal, or over-the-counter medicines you take. Do not take any new ones unless you talk to your doctor first. 
· Do not take anti-inflammatory medicines. These include ibuprofen (Advil, Motrin) and naproxen (Aleve). You can use acetaminophen (Tylenol) for pain. · Do not take two or more pain medicines at the same time unless the doctor told you to. Many pain medicines have acetaminophen, which is Tylenol. Too much acetaminophen (Tylenol) can be harmful. · Tell all doctors and others who work with your health care that you have kidney disease. · Wear medical alert jewelry that lists your health problem. You can buy this at most drugstores. Where can you learn more? Go to http://bridget-primo.info/. Enter R680 in the search box to learn more about \"Medicines to Avoid With Kidney Disease: Care Instructions. \" Current as of: November 20, 2015 Content Version: 11.2 © 1329-7856 [employer]. Care instructions adapted under license by [employer] (which disclaims liability or warranty for this information). If you have questions about a medical condition or this instruction, always ask your healthcare professional. [address] any warranty or liability for your use of this information. Introducing \[facility] [place] [facility]\"" & HEALTH SERVICES!    
 Lien Worthington introduces [employer] patient portal. Now you can access parts of your medical record, email your doctor's office, and request medication refills online. 1. In your internet browser, go to [email or web address] 2. Click on the First Time User? Click Here link in the Sign In box. You will see the New Member Sign Up page. 3. Enter your [employer] Access Code exactly as it appears below. You will not need to use this code after youve completed the sign-up process. If you do not sign up before the expiration date, you must request a new code. · [employer] Access Code: [email or web address] Expires: 6/8/2017 10:31 AM 
 
4. Enter the last four digits of your Social Security Number (xxxx) and Date of Birth (mm/dd/yyyy) as indicated and click Submit. You will be taken to the next sign-up page. 5. Create a [employer] ID. This will be your [employer] login ID and cannot be changed, so think of one that is secure and easy to remember. 6. Create a [employer] password. You can change your password at any time. 7. Enter your Password Reset Question and Answer. This can be used at a later time if you forget your password. 8. Enter your e-mail address. You will receive e-mail notification when new information is available in [address] 9. Click Sign Up. You can now view and download portions of your medical record. 10. Click the Download Summary menu link to download a portable copy of your medical information. If you have questions, please visit the Frequently Asked Questions section of the [employer] website. Remember, [employer] is NOT to be used for urgent needs. For medical emergencies, dial 911. Now available from your iPhone and Android! Please provide this summary of care documentation to your next provider. Your primary care clinician is listed as Hattie Perrin. If you have any questions after today's visit, please call [phone].

## 2017-04-17 NOTE — PATIENT INSTRUCTIONS
Continue gabapentin for now, may consider a higher dose  Start duloxetine 30 mg daily for 10 days, then 60 mg daily  Stop metoprolol and start carvedilol 12. 5 mg twice daily, which I hope will work better for blood pressure. Return in 4-6 weeks. Kidney Disease and High Blood Pressure: Care Instructions  Your Care Instructions  Long-term (chronic) kidney disease happens when the kidneys cannot remove waste and keep your body's fluids and chemicals in balance. Usually, the kidneys remove waste from the blood through the urine. When the kidneys are not working well, waste can build up so much that it poisons the body. Kidney disease can make you very tired. It also can cause swelling, or edema, in your legs or other areas of your body. High blood pressure is one of the major causes of chronic kidney disease. And kidney disease can also cause high blood pressure. No matter which came first, having high blood pressure damages the tiny blood vessels in the kidneys. If you have high blood pressure, it is important to lower it. There are many things you can do to lower your blood pressure, which may help slow or stop the damage to your kidneys. Follow-up care is a key part of your treatment and safety. Be sure to make and go to all appointments, and call your doctor if you are having problems. It's also a good idea to know your test results and keep a list of the medicines you take. How can you care for yourself at home? · Be safe with medicines. Take your medicines exactly as prescribed. Call your doctor if you have any problems with your medicine. You will probably need more than one medicine to lower your blood pressure. You will get more details on the specific medicines your doctor prescribes. · Work with your doctor and a dietitian to plan meals that have the right amount of nutrients for you. You will probably have to limit salt, fluids, and protein. · Stay at a healthy weight.  This is very important if you put on weight around the waist. Losing even 10 pounds can help you lower your blood pressure. · Manage other health problems such as diabetes and high cholesterol. You can help lower your risk for heart disease and blood vessel problems with a healthy lifestyle along with medicines. · Do not take ibuprofen (Advil, Motrin) or naproxen (Aleve), or similar medicines, unless your doctor tells you to. They may make chronic kidney disease worse. It is okay to take acetaminophen (Tylenol). · If your doctor recommends it, get more exercise. Walking is a good choice. Bit by bit, increase the amount you walk every day. Try for at least 30 minutes on most days of the week. You also may want to swim, bike, or do other activities. · Limit or avoid alcohol. Talk to your doctor about whether you can drink any alcohol. · Do not smoke or allow others to smoke around you. If you need help quitting, talk to your doctor about stop-smoking programs and medicines. These can increase your chances of quitting for good. When should you call for help? Call 911 anytime you think you may need emergency care. For example, call if:  · You passed out (lost consciousness). · You have symptoms of a heart attack, such as:  ¨ Chest pain or pressure. ¨ Sweating. ¨ Shortness of breath. ¨ Nausea or vomiting. ¨ Pain that spreads from the chest to the neck, jaw, or one or both shoulders or arms. ¨ Dizziness or lightheadedness. ¨ A fast or uneven pulse. After calling 911, chew 1 adult-strength aspirin. Wait for an ambulance. Do not try to drive yourself. Call your doctor now or seek immediate medical care if:  · You are confused or are more tired or weak than usual.  · You bleed or have bruises. Watch closely for changes in your health, and be sure to contact your doctor if:  · You do not want to eat. · You have new swelling of your arms or feet, or swelling that you already have gets worse.   · You do not get better as expected. Where can you learn more? Go to http://bridget-primo.info/. Enter U642 in the search box to learn more about \"Kidney Disease and High Blood Pressure: Care Instructions. \"  Current as of: November 20, 2015  Content Version: 11.2  © 8315-6716 [employer]. Care instructions adapted under license by [employer] (which disclaims liability or warranty for this information). If you have questions about a medical condition or this instruction, always ask your healthcare professional. [place] [address] any warranty or liability for your use of this information. Medicines to Avoid With Kidney Disease: Care Instructions  Your Care Instructions  Kidney disease means that your kidneys are not able to get rid of waste from the blood. So they can't keep your body's fluids and chemicals in balance. Usually, the kidneys get rid of waste from the blood through the urine. And they balance the fluids in the body. When your kidneys don't work as they should, you have to be careful about some medicines. They may harm your kidneys. Your doctor may tell you not to take them. Or he or she may change the dose. Medicines for pain and swelling, such as ibuprofen (Advil or Motrin) or naproxen (Aleve), can cause harm. So can some antibiotics and antacids. And you need to be careful about some drugs that treat cancer, lower blood pressure, or get rid of water from the body. Some herbal products could cause harm too. Follow-up care is a key part of your treatment and safety. Be sure to make and go to all appointments, and call your doctor if you are having problems. It's also a good idea to know your test results and keep a list of the medicines you take. How can you care for yourself at home? · Tell your doctor all the prescription, herbal, or over-the-counter medicines you take.  Do not take any new ones unless you talk to your doctor first.  · Do not take anti-inflammatory medicines. These include ibuprofen (Advil, Motrin) and naproxen (Aleve). You can use acetaminophen (Tylenol) for pain. · Do not take two or more pain medicines at the same time unless the doctor told you to. Many pain medicines have acetaminophen, which is Tylenol. Too much acetaminophen (Tylenol) can be harmful. · Tell all doctors and others who work with your health care that you have kidney disease. · Wear medical alert jewelry that lists your health problem. You can buy this at most drugsHobbyTalk. Where can you learn more? Go to http://bridgetO' Doughty'sprimo.info/. Enter Y205 in the search box to learn more about \"Medicines to Avoid With Kidney Disease: Care Instructions. \"  Current as of: November 20, 2015  Content Version: 11.2  © 2955-9181 [employer]. Care instructions adapted under license by [employer] (which disclaims liability or warranty for this information). If you have questions about a medical condition or this instruction, always ask your healthcare professional. [address] any warranty or liability for your use of this information.

## 2017-04-17 NOTE — PROGRESS NOTES
Davy Mandujano is a 67 y.o. male  Chief Complaint   Patient presents with    Heartburn     pt states he is not experiencing as often    Back Pain     still \"very painful\"    Nausea     resolved    Dizziness     resolved     1. Have you been to an emergency room, urgent clinic, or hospitalized since your last visit? NO  If yes, where when, and reason for visit? 2. Have seen or consulted any other health care provider since your last visit? NO  Please include any pap smears or colon screening in this section  If yes, where when, and reason for visit? 6. Do you have an Advanced Directive/ Living Will in place?  NO  If yes, do we have a copy on file NO  If no, would you like information NO

## 2017-04-20 DIAGNOSIS — I12.9 BENIGN HYPERTENSION WITH CHRONIC KIDNEY DISEASE, STAGE III (HCC): ICD-10-CM

## 2017-04-20 DIAGNOSIS — N18.30 BENIGN HYPERTENSION WITH CHRONIC KIDNEY DISEASE, STAGE III (HCC): ICD-10-CM

## 2017-04-20 DIAGNOSIS — M48.062 LUMBAR STENOSIS WITH NEUROGENIC CLAUDICATION: ICD-10-CM

## 2017-04-20 RX ORDER — CARVEDILOL 12.5 MG/1
12.5 TABLET ORAL 2 TIMES DAILY WITH MEALS
Qty: 60 TAB | Refills: 5 | Status: SHIPPED | OUTPATIENT
Start: 2017-04-20 | End: 2018-01-15 | Stop reason: SDUPTHER

## 2017-04-20 RX ORDER — DULOXETIN HYDROCHLORIDE 60 MG/1
60 CAPSULE, DELAYED RELEASE ORAL DAILY
Qty: 30 CAP | Refills: 5 | Status: SHIPPED | OUTPATIENT
Start: 2017-04-20 | End: 2018-01-03 | Stop reason: SDUPTHER

## 2017-04-20 RX ORDER — DULOXETIN HYDROCHLORIDE 30 MG/1
30 CAPSULE, DELAYED RELEASE ORAL DAILY
Qty: 10 CAP | Refills: 0 | Status: SHIPPED | OUTPATIENT
Start: 2017-04-20 | End: 2017-05-25

## 2017-04-20 NOTE — TELEPHONE ENCOUNTER
Pt's wife called and stated that pt's medication from 4/17 was sent to [facility] and it is closed for two weeks. Pt would like to have medication called in [facility] in [facility] instead.

## 2017-04-27 RX ORDER — LISINOPRIL AND HYDROCHLOROTHIAZIDE 12.5; 2 MG/1; MG/1
TABLET ORAL
Qty: 60 TAB | Refills: 11 | Status: SHIPPED | OUTPATIENT
Start: 2017-04-27 | End: 2018-06-08 | Stop reason: SDUPTHER

## 2017-04-27 RX ORDER — ATORVASTATIN CALCIUM 80 MG/1
TABLET, FILM COATED ORAL
Qty: 30 TAB | Refills: 11 | Status: SHIPPED | OUTPATIENT
Start: 2017-04-27 | End: 2018-06-08 | Stop reason: SDUPTHER

## 2017-05-25 ENCOUNTER — OFFICE VISIT (OUTPATIENT)
Dept: INTERNAL MEDICINE CLINIC | Age: 77
End: 2017-05-25

## 2017-05-25 VITALS
BODY MASS INDEX: 30.69 KG/M2 | RESPIRATION RATE: 14 BRPM | SYSTOLIC BLOOD PRESSURE: 128 MMHG | DIASTOLIC BLOOD PRESSURE: 85 MMHG | HEIGHT: 68 IN | HEART RATE: 77 BPM | WEIGHT: 202.5 LBS | TEMPERATURE: 98.1 F | OXYGEN SATURATION: 95 %

## 2017-05-25 DIAGNOSIS — N18.30 BENIGN HYPERTENSION WITH CHRONIC KIDNEY DISEASE, STAGE III (HCC): Primary | ICD-10-CM

## 2017-05-25 DIAGNOSIS — M48.062 LUMBAR STENOSIS WITH NEUROGENIC CLAUDICATION: ICD-10-CM

## 2017-05-25 DIAGNOSIS — I12.9 BENIGN HYPERTENSION WITH CHRONIC KIDNEY DISEASE, STAGE III (HCC): Primary | ICD-10-CM

## 2017-05-25 DIAGNOSIS — R73.02 GLUCOSE INTOLERANCE (IMPAIRED GLUCOSE TOLERANCE): ICD-10-CM

## 2017-05-25 NOTE — PROGRESS NOTES
HISTORY OF PRESENT ILLNESS  Pilar Rodarte is a 67 y.o. male. HPI   He presents for follow up of hypertension. He also has hyperlipidemia, coronary artery disease, status post CABG, CKD and glucose intolerance. At visit a month ago BP was 144/82. We stopped metoprolol  mg daily and started carvedilol 12.5 mg twice daily  Diet and Lifestyle: generally follows a low sodium diet, exercises sporadically, nonsmoker  Medication compliance: compliant all of the time  Medication side effects: none  Home BP Monitoring:  is well controlled at home, ranging 120's/80's  Cardiovascular ROS:  He denies palpitations, exertional chest pressure/discomfort, lower extremity edema, dyspnea on exertion     He presents for presents with a several year history of low back problems. At office visit on 4/17 we prescribed duloxetine starting at 30 mg daily, and increasing to 60 mg daily. Now pain is greatly reduced in low back radiating to both thighs, (dull in character; 2/10 in severity at worst, but there are times he has no pain, for example if sitting still). Exacerbating factors: none currently  Alleviating factors shot in back ( probably GARY) on 4/17, to go back for 2 more. Cymbalta may also be helping. Treatments so far tried by patient: Tylenol, gabapentin 300 mg 3 times a day (which he has stopped taking). Previous lower back problems: has had surgery 9/2016 revision laminectomy L4-L5 and dural repair. He felt no improvement in pain after the procedure. Previous workup: MRI. Previous treatments: GARY which did not help pre op.      Patient Active Problem List   Diagnosis Code    Hyperlipidemia, mixed E78.2    Impotence N52.9    Diverticulosis K57.90    Hemorrhoids K64.9    Vitamin D deficiency E55.9    Benign hypertension with chronic kidney disease, stage III I12.9, N18.3    GERD (gastroesophageal reflux disease) K21.9    Intermittent angle-closure glaucoma H40.239    CAD (coronary artery disease), native coronary artery I25.10    Nephrolithiasis N20.0    Left median nerve neuropathy G56.12    CKD (chronic kidney disease) stage 3, GFR 30-59 ml/min N18.3    Advance directive discussed with patient Z71.89    Lumbar stenosis with neurogenic claudication M48.06    Unequal blood pressure in upper extremities R09.89    Glucose intolerance (impaired glucose tolerance) R73.02     Past Medical History:   Diagnosis Date    Arrhythmia     atrial fibrillation    Arthritis     CAD (coronary artery disease) 12/24/2013    Cataract     Diverticulosis 9/22/2009    Dyslipidemia 9/22/2009    Heart failure ([address] 12/2013    MILD HEART ATTACK     Hemorrhoids 9/22/2009    HTN 9/22/2009    Hypercholesterolemia     Impotence 9/22/2009    Neuropathy 9/22/2009    Open angle glaucoma with borderline intraocular pressure     Vitamin D deficiency 9/22/2009     Past Surgical History:   Procedure Laterality Date    CARDIAC SURG PROCEDURE UNLIST  2013    triple bypass december 2013    ENDOSCOPY, COLON, DIAGNOSTIC  [id number]    HX HERNIA REPAIR      HX LITHOTRIPSY  [id number]    HX ORTHOPAEDIC      back surgery     Social History     Social History    Marital status:      Spouse name: N/A    Number of children: N/A    Years of education: N/A     Social History Main Topics    Smoking status: Never Smoker    Smokeless tobacco: Never Used    Alcohol use No    Drug use: No    Sexual activity: Yes     Partners: Female     Other Topics Concern    None     Social History Narrative     Family History   Problem Relation Age of Onset    Hypertension Mother     Diabetes Mother     Cancer Father      LIVER    Alcohol abuse Brother     Diabetes Sister     Hypertension Sister     Arthritis-osteo Sister     Kidney Disease Sister      DIALYSIS    Alcohol abuse Brother     Suicide Brother     No Known Problems Brother     No Known Problems Brother     No Known Problems Brother     Arthritis-osteo Brother     Hypertension Son    [facility] Hypertension Son     Anesth Problems Neg Hx      Allergies   Allergen Reactions    Percocet [Oxycodone-Acetaminophen] Other (comments)     \"feel weird\"     Current Outpatient Prescriptions   Medication Sig Dispense Refill    lisinopril-hydroCHLOROthiazide (PRINZIDE, ZESTORETIC) 20-12.5 mg per tablet TAKE TWO TABLETS ONCE DAILY 60 Tab 11    atorvastatin (LIPITOR) 80 mg tablet TAKE ONE TABLET ONCE DAILY 30 Tab 11    carvedilol (COREG) 12.5 mg tablet Take 1 Tab by mouth two (2) times daily (with meals). 60 Tab 5    DULoxetine (CYMBALTA) 60 mg capsule Take 1 Cap by mouth daily. 30 Cap 5    aspirin 81 mg chewable tablet Take 1 Tab by mouth nightly. 90 Tab 3    gabapentin (NEURONTIN) 300 mg capsule Take 1 Cap by mouth three (3) times daily. 90 Cap 5    amLODIPine (NORVASC) 5 mg tablet Take 1 Tab by mouth daily. Indications: Hypertension 30 Tab 11    cetirizine (ZYRTEC) 10 mg tablet Take 1 Tab by mouth daily. 20 Tab 0    cholecalciferol (VITAMIN D3) 1,000 unit cap Take  by mouth nightly. ROS  Visit Vitals    /85 (BP 1 Location: Left arm, BP Patient Position: Sitting)    Pulse 77    Temp 98.1 °F (36.7 °C) (Oral)    Resp 14    Ht 5' 8\" (1.727 m)    Wt 202 lb 8 oz (91.9 kg)    SpO2 95%    BMI 30.79 kg/m2     Physical Exam   Constitutional: He is oriented to person, place, and time. He appears well-developed and well-nourished. HENT:   Head: Normocephalic and atraumatic. Eyes: Conjunctivae are normal. Pupils are equal, round, and reactive to light. Neck: Neck supple. Carotid bruit is not present. Cardiovascular: Normal rate, regular rhythm, S1 normal, S2 normal and normal heart sounds. Exam reveals no gallop. No murmur heard. Pulmonary/Chest: Effort normal and breath sounds normal. He has no wheezes. He has no rhonchi. He has no rales. Musculoskeletal: He exhibits no edema. Neurological: He is alert and oriented to person, place, and time.    Skin: Skin is warm, dry and intact. Psychiatric: He has a normal mood and affect. His behavior is normal.   Nursing note and vitals reviewed. ASSESSMENT and PLAN    ICD-10-CM ICD-9-CM    1. Benign hypertension with chronic kidney disease, stage III Z83.0 714.49 METABOLIC PANEL, BASIC    N94.5 585.3    2. Glucose intolerance (impaired glucose tolerance) R73.02 790.22 HEMOGLOBIN A1C WITH EAG   3. Lumbar stenosis with neurogenic claudication M48.06 724.03          Benign hypertension with chronic kidney disease, stage III  Hypertension is controlled  -     METABOLIC PANEL, BASIC; Future    Glucose intolerance (impaired glucose tolerance)  Stable  -     HEMOGLOBIN A1C WITH EAG; Future    Lumbar stenosis with neurogenic claudication  Greatly improved. ?combination of Duloxetine and GARY. 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 Need records from [address] [place] [address]  Follow-up Disposition:  Return in about 4 months (around 9/25/2017) for HTN, gluc, CKD  NON-fasting lab one week prior. reviewed diet, exercise and weight control  I have discussed the diagnosis with the patient and the intended plan as seen in the above orders. Patient is in agreement. The patient has received an after-visit summary and questions were answered concerning future plans. I have discussed medication side effects and warnings with the patient as well.

## 2017-05-25 NOTE — PROGRESS NOTES
Reviewed record In preparation for visit and have obtained necessary documentation. Has info on advanced directive but has not filled them out. 1. Have you been to the ER, urgent care clinic or hospitalized since your last visit? No     2. Have you seen or consulted any other health care providers outside of the [address] [place] [address] since your last visit? Include any pap smears or colon screening.  Orthopedics,     Health Maintenance reviewed:

## 2017-05-25 NOTE — PATIENT INSTRUCTIONS
Office visit in 4 months with non-fasting lab work a week before visit. High Blood Pressure: Care Instructions  Your Care Instructions  If your blood pressure is usually above 140/90, you have high blood pressure, or hypertension. That means the top number is 140 or higher or the bottom number is 90 or higher, or both. Despite what a lot of people think, high blood pressure usually doesn't cause headaches or make you feel dizzy or lightheaded. It usually has no symptoms. But it does increase your risk for heart attack, stroke, and kidney or eye damage. The higher your blood pressure, the more your risk increases. Your doctor will give you a goal for your blood pressure. Your goal will be based on your health and your age. An example of a goal is to keep your blood pressure below 140/90. Lifestyle changes, such as eating healthy and being active, are always important to help lower blood pressure. You might also take medicine to reach your blood pressure goal.  Follow-up care is a key part of your treatment and safety. Be sure to make and go to all appointments, and call your doctor if you are having problems. It's also a good idea to know your test results and keep a list of the medicines you take. How can you care for yourself at home? Medical treatment  · If you stop taking your medicine, your blood pressure will go back up. You may take one or more types of medicine to lower your blood pressure. Be safe with medicines. Take your medicine exactly as prescribed. Call your doctor if you think you are having a problem with your medicine. · Talk to your doctor before you start taking aspirin every day. Aspirin can help certain people lower their risk of a heart attack or stroke. But taking aspirin isn't right for everyone, because it can cause serious bleeding. · See your doctor regularly. You may need to see the doctor more often at first or until your blood pressure comes down.   · If you are taking blood pressure medicine, talk to your doctor before you take decongestants or anti-inflammatory medicine, such as ibuprofen. Some of these medicines can raise blood pressure. · Learn how to check your blood pressure at home. Lifestyle changes  · Stay at a healthy weight. This is especially important if you put on weight around the waist. Losing even 10 pounds can help you lower your blood pressure. · If your doctor recommends it, get more exercise. Walking is a good choice. Bit by bit, increase the amount you walk every day. Try for at least 30 minutes on most days of the week. You also may want to swim, bike, or do other activities. · Avoid or limit alcohol. Talk to your doctor about whether you can drink any alcohol. · Try to limit how much sodium you eat to less than 2,300 milligrams (mg) a day. Your doctor may ask you to try to eat less than 1,500 mg a day. · Eat plenty of fruits (such as bananas and oranges), vegetables, legumes, whole grains, and low-fat dairy products. · Lower the amount of saturated fat in your diet. Saturated fat is found in animal products such as milk, cheese, and meat. Limiting these foods may help you lose weight and also lower your risk for heart disease. · Do not smoke. Smoking increases your risk for heart attack and stroke. If you need help quitting, talk to your doctor about stop-smoking programs and medicines. These can increase your chances of quitting for good. When should you call for help? Call 911 anytime you think you may need emergency care. This may mean having symptoms that suggest that your blood pressure is causing a serious heart or blood vessel problem. Your blood pressure may be over 180/110. For example, call 911 if:  · You have symptoms of a heart attack. These may include:  ¨ Chest pain or pressure, or a strange feeling in the chest.  ¨ Sweating. ¨ Shortness of breath. ¨ Nausea or vomiting.   ¨ Pain, pressure, or a strange feeling in the back, neck, jaw, or upper belly or in one or both shoulders or arms. ¨ Lightheadedness or sudden weakness. ¨ A fast or irregular heartbeat. · You have symptoms of a stroke. These may include:  ¨ Sudden numbness, tingling, weakness, or loss of movement in your face, arm, or leg, especially on only one side of your body. ¨ Sudden vision changes. ¨ Sudden trouble speaking. ¨ Sudden confusion or trouble understanding simple statements. ¨ Sudden problems with walking or balance. ¨ A sudden, severe headache that is different from past headaches. · You have severe back or belly pain. Do not wait until your blood pressure comes down on its own. Get help right away. Call your doctor now or seek immediate care if:  · Your blood pressure is much higher than normal (such as 180/110 or higher), but you don't have symptoms. · You think high blood pressure is causing symptoms, such as:  ¨ Severe headache. ¨ Blurry vision. Watch closely for changes in your health, and be sure to contact your doctor if:  · Your blood pressure measures 140/90 or higher at least 2 times. That means the top number is 140 or higher or the bottom number is 90 or higher, or both. · You think you may be having side effects from your blood pressure medicine. · Your blood pressure is usually normal, but it goes above normal at least 2 times. Where can you learn more? Go to http://bridget-primo.info/. Enter Y197 in the search box to learn more about \"High Blood Pressure: Care Instructions. \"  Current as of: August 8, 2016  Content Version: 11.2  © 6527-3078 [employer]. Care instructions adapted under license by [employer] (which disclaims liability or warranty for this information). If you have questions about a medical condition or this instruction, always ask your healthcare professional. [place] [address] any warranty or liability for your use of this information.

## 2017-05-25 NOTE — MR AVS SNAPSHOT
Visit Information Date & Time Provider Department Dept. Phone Encounter #  
 5/25/2017  1:00 PM Burt Ernandez MD Sandra Montague [phone] [id number] Follow-up Instructions Return in about 4 months (around 9/25/2017) for HTN, gluc, CKD  NON-fasting lab one week prior. Upcoming Health Maintenance Date Due DTaP/Tdap/Td series (1 - Tdap) 9/6/1960 ZOSTER VACCINE AGE 60> 9/6/1999 GLAUCOMA SCREENING Q2Y 6/26/2017 INFLUENZA AGE 9 TO ADULT 8/1/2017 COLONOSCOPY 1/1/2018 MEDICARE YEARLY EXAM 3/11/2018 Allergies as of 5/25/2017  Review Complete On: 5/25/2017 By: Burt Ernandez MD  
  
 Severity Noted Reaction Type Reactions Percocet [Oxycodone-acetaminophen]  09/22/2009    Other (comments) \"feel weird\" Current Immunizations  Reviewed on 5/25/2017 Name Date Influenza High Dose Vaccine PF 9/14/2015 Influenza Vaccine (Quad) PF 9/9/2016 Pneumococcal Conjugate (PCV-13) 3/12/2015 Pneumococcal Vaccine (Unspecified Type) 4/22/2010 Reviewed by Annia Brown LPN on 8/45/0625 at  1:01 PM  
You Were Diagnosed With   
  
 Codes Comments Benign hypertension with chronic kidney disease, stage III    -  Primary ICD-10-CM: I12.9, N18.3 ICD-9-CM: 403.10, 585.3 Glucose intolerance (impaired glucose tolerance)     ICD-10-CM: R73.02 
ICD-9-CM: 790.22 Lumbar stenosis with neurogenic claudication     ICD-10-CM: M48.06 
ICD-9-CM: 724.03 Vitals BP Pulse Temp Resp Height(growth percentile) Weight(growth percentile) 128/85 (BP 1 Location: Left arm, BP Patient Position: Sitting) 77 98.1 °F (36.7 °C) (Oral) 14 5' 8\" (1.727 m) 202 lb 8 oz (91.9 kg) SpO2 BMI Smoking Status 95% 30.79 kg/m2 Never Smoker BMI and BSA Data Body Mass Index Body Surface Area 30.79 kg/m 2 2.1 m 2 Preferred Pharmacy Pharmacy Name Phone  [facility] - [place], [place] - [address] [place] [address] [phone] Your Updated Medication List  
  
   
This list is accurate as of: 5/25/17  1:57 PM.  Always use your most recent med list. amLODIPine 5 mg tablet Commonly known as:  Ricky Jacob Take 1 Tab by mouth daily. Indications: Hypertension  
  
 aspirin 81 mg chewable tablet Take 1 Tab by mouth nightly. atorvastatin 80 mg tablet Commonly known as:  LIPITOR  
TAKE ONE TABLET ONCE DAILY  
  
 carvedilol 12.5 mg tablet Commonly known as:  [place] Take 1 Tab by mouth two (2) times daily (with meals). cetirizine 10 mg tablet Commonly known as:  ZyrTEC Take 1 Tab by mouth daily. cholecalciferol 1,000 unit Cap Commonly known as:  VITAMIN D3 Take  by mouth nightly. DULoxetine 60 mg capsule Commonly known as:  CYMBALTA Take 1 Cap by mouth daily. gabapentin 300 mg capsule Commonly known as:  NEURONTIN Take 1 Cap by mouth three (3) times daily. lisinopril-hydroCHLOROthiazide 20-12.5 mg per tablet Commonly known as:  PRINZIDE, ZESTORETIC  
TAKE TWO TABLETS ONCE DAILY Follow-up Instructions Return in about 4 months (around 9/25/2017) for HTN, gluc, CKD  NON-fasting lab one week prior. To-Do List   
 09/25/2017 Lab:  HEMOGLOBIN A1C WITH EAG   
  
 09/25/2017 Lab:  METABOLIC PANEL, BASIC Patient Instructions Office visit in 4 months with non-fasting lab work a week before visit. High Blood Pressure: Care Instructions Your Care Instructions If your blood pressure is usually above 140/90, you have high blood pressure, or hypertension. That means the top number is 140 or higher or the bottom number is 90 or higher, or both. Despite what a lot of people think, high blood pressure usually doesn't cause headaches or make you feel dizzy or lightheaded. It usually has no symptoms.  But it does increase your risk for heart attack, stroke, and kidney or eye damage. The higher your blood pressure, the more your risk increases. Your doctor will give you a goal for your blood pressure. Your goal will be based on your health and your age. An example of a goal is to keep your blood pressure below 140/90. Lifestyle changes, such as eating healthy and being active, are always important to help lower blood pressure. You might also take medicine to reach your blood pressure goal. 
Follow-up care is a key part of your treatment and safety. Be sure to make and go to all appointments, and call your doctor if you are having problems. It's also a good idea to know your test results and keep a list of the medicines you take. How can you care for yourself at home? Medical treatment · If you stop taking your medicine, your blood pressure will go back up. You may take one or more types of medicine to lower your blood pressure. Be safe with medicines. Take your medicine exactly as prescribed. Call your doctor if you think you are having a problem with your medicine. · Talk to your doctor before you start taking aspirin every day. Aspirin can help certain people lower their risk of a heart attack or stroke. But taking aspirin isn't right for everyone, because it can cause serious bleeding. · See your doctor regularly. You may need to see the doctor more often at first or until your blood pressure comes down. · If you are taking blood pressure medicine, talk to your doctor before you take decongestants or anti-inflammatory medicine, such as ibuprofen. Some of these medicines can raise blood pressure. · Learn how to check your blood pressure at home. Lifestyle changes · Stay at a healthy weight. This is especially important if you put on weight around the waist. Losing even 10 pounds can help you lower your blood pressure. · If your doctor recommends it, get more exercise. Walking is a good choice. Bit by bit, increase the amount you walk every day.  Try for at least 30 minutes on most days of the week. You also may want to swim, bike, or do other activities. · Avoid or limit alcohol. Talk to your doctor about whether you can drink any alcohol. · Try to limit how much sodium you eat to less than 2,300 milligrams (mg) a day. Your doctor may ask you to try to eat less than 1,500 mg a day. · Eat plenty of fruits (such as bananas and oranges), vegetables, legumes, whole grains, and low-fat dairy products. · Lower the amount of saturated fat in your diet. Saturated fat is found in animal products such as milk, cheese, and meat. Limiting these foods may help you lose weight and also lower your risk for heart disease. · Do not smoke. Smoking increases your risk for heart attack and stroke. If you need help quitting, talk to your doctor about stop-smoking programs and medicines. These can increase your chances of quitting for good. When should you call for help? Call 911 anytime you think you may need emergency care. This may mean having symptoms that suggest that your blood pressure is causing a serious heart or blood vessel problem. Your blood pressure may be over 180/110. For example, call 911 if: 
· You have symptoms of a heart attack. These may include: ¨ Chest pain or pressure, or a strange feeling in the chest. 
¨ Sweating. ¨ Shortness of breath. ¨ Nausea or vomiting. ¨ Pain, pressure, or a strange feeling in the back, neck, jaw, or upper belly or in one or both shoulders or arms. ¨ Lightheadedness or sudden weakness. ¨ A fast or irregular heartbeat. · You have symptoms of a stroke. These may include: 
¨ Sudden numbness, tingling, weakness, or loss of movement in your face, arm, or leg, especially on only one side of your body. ¨ Sudden vision changes. ¨ Sudden trouble speaking. ¨ Sudden confusion or trouble understanding simple statements. ¨ Sudden problems with walking or balance. ¨ A sudden, severe headache that is different from past headaches. · You have severe back or belly pain. Do not wait until your blood pressure comes down on its own. Get help right away. Call your doctor now or seek immediate care if: 
· Your blood pressure is much higher than normal (such as 180/110 or higher), but you don't have symptoms. · You think high blood pressure is causing symptoms, such as: ¨ Severe headache. ¨ Blurry vision. Watch closely for changes in your health, and be sure to contact your doctor if: 
· Your blood pressure measures 140/90 or higher at least 2 times. That means the top number is 140 or higher or the bottom number is 90 or higher, or both. · You think you may be having side effects from your blood pressure medicine. · Your blood pressure is usually normal, but it goes above normal at least 2 times. Where can you learn more? Go to http://bridget-primo.info/. Enter X382 in the search box to learn more about \"High Blood Pressure: Care Instructions. \" Current as of: August 8, 2016 Content Version: 11.2 © 3572-9145 [employer]. Care instructions adapted under license by [employer] (which disclaims liability or warranty for this information). If you have questions about a medical condition or this instruction, always ask your healthcare professional. [address] any warranty or liability for your use of this information. Introducing [facility] [place] [facility]! [employer] introduces [employer] patient portal. Now you can access parts of your medical record, email your doctor's office, and request medication refills online. 1. In your internet browser, go to [email or web address] 2. Click on the First Time User? Click Here link in the Sign In box. You will see the New Member Sign Up page. 3. Enter your [employer] Access Code exactly as it appears below. You will not need to use this code after youve completed the sign-up process.  If you do not sign up before the expiration date, you must request a new code. · [email or web address] Access Code: [email or web address] Expires: 6/8/2017 10:31 AM 
 
4. Enter the last four digits of your Social Security Number (xxxx) and Date of Birth (mm/dd/yyyy) as indicated and click Submit. You will be taken to the next sign-up page. 5. Create a [email or web address] ID. This will be your [email or web address] login ID and cannot be changed, so think of one that is secure and easy to remember. 6. Create a [email or web address] password. You can change your password at any time. 7. Enter your Password Reset Question and Answer. This can be used at a later time if you forget your password. 8. Enter your e-mail address. You will receive e-mail notification when new information is available in [address] 9. Click Sign Up. You can now view and download portions of your medical record. 10. Click the Download Summary menu link to download a portable copy of your medical information. If you have questions, please visit the Frequently Asked Questions section of the [email or web address] website. Remember, [email or web address] is NOT to be used for urgent needs. For medical emergencies, dial 911. Now available from your iPhone and Android! Please provide this summary of care documentation to your next provider. Your primary care clinician is listed as Prudence Lobe. If you have any questions after today's visit, please call [phone].

## 2017-08-11 PROBLEM — H25.811 COMBINED FORMS OF AGE-RELATED CATARACT OF RIGHT EYE: Status: ACTIVE | Noted: 2017-08-11

## 2017-08-15 ENCOUNTER — OFFICE VISIT (OUTPATIENT)
Dept: INTERNAL MEDICINE CLINIC | Age: 77
End: 2017-08-15

## 2017-08-15 VITALS
OXYGEN SATURATION: 96 % | HEART RATE: 62 BPM | SYSTOLIC BLOOD PRESSURE: 146 MMHG | HEIGHT: 68 IN | BODY MASS INDEX: 30.84 KG/M2 | RESPIRATION RATE: 14 BRPM | DIASTOLIC BLOOD PRESSURE: 78 MMHG | WEIGHT: 203.5 LBS | TEMPERATURE: 98.3 F

## 2017-08-15 DIAGNOSIS — N18.30 BENIGN HYPERTENSION WITH CHRONIC KIDNEY DISEASE, STAGE III (HCC): ICD-10-CM

## 2017-08-15 DIAGNOSIS — H25.9 AGE-RELATED CATARACT OF BOTH EYES, UNSPECIFIED AGE-RELATED CATARACT TYPE: ICD-10-CM

## 2017-08-15 DIAGNOSIS — R73.02 GLUCOSE INTOLERANCE (IMPAIRED GLUCOSE TOLERANCE): ICD-10-CM

## 2017-08-15 DIAGNOSIS — E78.2 HYPERLIPIDEMIA, MIXED: ICD-10-CM

## 2017-08-15 DIAGNOSIS — I25.10 CORONARY ARTERY DISEASE INVOLVING NATIVE CORONARY ARTERY OF NATIVE HEART WITHOUT ANGINA PECTORIS: ICD-10-CM

## 2017-08-15 DIAGNOSIS — I12.9 BENIGN HYPERTENSION WITH CHRONIC KIDNEY DISEASE, STAGE III (HCC): ICD-10-CM

## 2017-08-15 DIAGNOSIS — Z13.31 SCREENING FOR DEPRESSION: ICD-10-CM

## 2017-08-15 DIAGNOSIS — M48.062 LUMBAR STENOSIS WITH NEUROGENIC CLAUDICATION: ICD-10-CM

## 2017-08-15 DIAGNOSIS — R09.89 UNEQUAL BLOOD PRESSURE IN UPPER EXTREMITIES: ICD-10-CM

## 2017-08-15 DIAGNOSIS — Z01.818 PREOP GENERAL PHYSICAL EXAM: Primary | ICD-10-CM

## 2017-08-15 RX ORDER — AMLODIPINE BESYLATE 10 MG/1
10 TABLET ORAL DAILY
Qty: 30 TAB | Refills: 11 | Status: SHIPPED | OUTPATIENT
Start: 2017-08-15 | End: 2018-01-15 | Stop reason: SDUPTHER

## 2017-08-15 RX ORDER — PREDNISOLONE ACETATE 10 MG/ML
SUSPENSION/ DROPS OPHTHALMIC
Refills: 0 | COMMUNITY
Start: 2017-08-30 | End: 2017-09-15

## 2017-08-15 RX ORDER — MOXIFLOXACIN 5 MG/ML
SOLUTION/ DROPS OPHTHALMIC
Refills: 0 | COMMUNITY
Start: 2017-08-20 | End: 2018-03-29

## 2017-08-15 NOTE — PROGRESS NOTES
HISTORY OF PRESENT ILLNESS  Michael Ybarra is a 67 y.o. male. HPI  Presents at request of Sr Patricia Carias for evaluation of medical problems prior to cataract surgery planned for 8/23 (OD) and 8/30/82017 (OS). He has hypertension with chronic kidney disease, hyperlipidemia, coronary artery disease, history of prior MI, status post CABG and glucose intolerance. Blood pressure is unequal in UE's. Diet and Lifestyle: generally follows a low fat low cholesterol diet, generally follows a low sodium diet, does not rigorously follow a diabetic diet, sedentary, nonsmoker  Medication compliance: compliant most of the time  Medication side effects: none  Home BP Monitoring:  is borderline controlled at home, ranging 140's/70's  Cardiovascular ROS:  He complains of claudication (neurogenic). He denies palpitations, orthopnea, exertional chest pressure/discomfort, lower extremity edema, dyspnea on exertion, dizziness     He has chronic back pain with radiation to both thighs. He had surgery 9/2016: revision laminectomy L4-L5 and dural repair. He felt no improvement in pain after the procedure. Duloxetine titrated to 60 mg daily, and gabapentin, have helped with pain. Has had series of GARY--had 2--and it has helped tremendously.      PHQ over the last two weeks 8/15/2017   Little interest or pleasure in doing things Not at all   Feeling down, depressed or hopeless Not at all   Total Score PHQ 2 0       Patient Active Problem List   Diagnosis Code    Hyperlipidemia, mixed E78.2    Impotence N52.9    Diverticulosis K57.90    Hemorrhoids K64.9    Vitamin D deficiency E55.9    Benign hypertension with chronic kidney disease, stage III I12.9, N18.3    GERD (gastroesophageal reflux disease) K21.9    Intermittent angle-closure glaucoma H40.239    CAD (coronary artery disease), native coronary artery I25.10    Nephrolithiasis N20.0    Left median nerve neuropathy G56.12    CKD (chronic kidney disease) stage 3, GFR 30-59 ml/min N18.3    Advance directive discussed with patient Z71.89    Lumbar stenosis with neurogenic claudication M48.06    Unequal blood pressure in upper extremities R09.89    Glucose intolerance (impaired glucose tolerance) R73.02    Combined forms of age-related cataract of right eye H25.811     Past Medical History:   Diagnosis Date    Arrhythmia     atrial fibrillation    Arthritis     CAD (coronary artery disease) 12/24/2013    Cataract     Diverticulosis 9/22/2009    Dyslipidemia 9/22/2009    Heart failure ([address] 12/2013    MILD HEART ATTACK     Hemorrhoids 9/22/2009    HTN 9/22/2009    Hypercholesterolemia     Impotence 9/22/2009    Neuropathy ([address] 9/22/2009    Open angle glaucoma with borderline intraocular pressure     Vitamin D deficiency 9/22/2009     Past Surgical History:   Procedure Laterality Date    CARDIAC SURG PROCEDURE UNLIST  2013    triple bypass december 2013    ENDOSCOPY, COLON, DIAGNOSTIC  [id number]    HX HERNIA REPAIR      HX LITHOTRIPSY  [id number]    HX ORTHOPAEDIC      back surgery     Social History     Social History    Marital status:      Spouse name: N/A    Number of children: N/A    Years of education: N/A     Social History Main Topics    Smoking status: Never Smoker    Smokeless tobacco: Never Used    Alcohol use No    Drug use: No    Sexual activity: Yes     Partners: Female     Other Topics Concern    None     Social History Narrative     Family History   Problem Relation Age of Onset    Hypertension Mother     Diabetes Mother     Cancer Father      LIVER    Alcohol abuse Brother     Diabetes Sister     Hypertension Sister     Arthritis-osteo Sister     Kidney Disease Sister      DIALYSIS    Alcohol abuse Brother     Suicide Brother     No Known Problems Brother     No Known Problems Brother     No Known Problems Brother     Arthritis-osteo Brother     Hypertension Son    [facility] Hypertension Son    [facility] Anesth Problems Neg Hx      Allergies   Allergen Reactions    Percocet [Oxycodone-Acetaminophen] Other (comments)     \"feel weird\"     Current Outpatient Prescriptions   Medication Sig Dispense Refill    [START ON 8/30/2017] moxifloxacin (VIGAMOX) 0.5 % ophthalmic solution instill 1 drop into right eye four times a day until finished - S...  (REFER TO PRESCRIPTION NOTES). 0    [START ON 8/30/2017] prednisoLONE acetate (PRED FORTE) 1 % ophthalmic suspension instill 1 drop into right eye four times a day until finished - START THIS DROP AFTER SURGERY  0    lisinopril-hydroCHLOROthiazide (PRINZIDE, ZESTORETIC) 20-12.5 mg per tablet TAKE TWO TABLETS ONCE DAILY 60 Tab 11    atorvastatin (LIPITOR) 80 mg tablet TAKE ONE TABLET ONCE DAILY 30 Tab 11    carvedilol (COREG) 12.5 mg tablet Take 1 Tab by mouth two (2) times daily (with meals). 60 Tab 5    DULoxetine (CYMBALTA) 60 mg capsule Take 1 Cap by mouth daily. 30 Cap 5    aspirin 81 mg chewable tablet Take 1 Tab by mouth nightly. 90 Tab 3    gabapentin (NEURONTIN) 300 mg capsule Take 1 Cap by mouth three (3) times daily. 90 Cap 5    amLODIPine (NORVASC) 5 mg tablet Take 1 Tab by mouth daily. Indications: Hypertension 30 Tab 11    cholecalciferol (VITAMIN D3) 1,000 unit cap Take  by mouth nightly. Review of Systems   Constitutional: Positive for malaise/fatigue. Negative for weight loss. Gastrointestinal: Positive for constipation. Negative for diarrhea. Musculoskeletal: Positive for back pain and joint pain. Neurological: Positive for tingling (left arm) and focal weakness (left arm). Negative for dizziness. Visit Vitals    /78 (BP 1 Location: Left arm, BP Patient Position: Sitting)    Pulse 62    Temp 98.3 °F (36.8 °C) (Oral)    Resp 14    Ht 5' 8\" (1.727 m)    Wt 203 lb 8 oz (92.3 kg)    SpO2 96%    BMI 30.94 kg/m2     Physical Exam   Constitutional: He is oriented to person, place, and time. He appears well-developed and well-nourished. HENT:   Head: Normocephalic and atraumatic. Eyes: Conjunctivae are normal. Pupils are equal, round, and reactive to light. Neck: Neck supple. Carotid bruit is not present. No thyromegaly present. Cardiovascular: Normal rate, regular rhythm and normal heart sounds. PMI is not displaced. Exam reveals no gallop. No murmur heard. Pulses:       Dorsalis pedis pulses are 2+ on the right side, and 2+ on the left side. Posterior tibial pulses are 2+ on the right side, and 2+ on the left side. Pulmonary/Chest: Effort normal. He has no wheezes. He has no rhonchi. He has no rales. Abdominal: Soft. Normal appearance. He exhibits no abdominal bruit and no mass. There is no hepatosplenomegaly. There is no tenderness. Musculoskeletal: He exhibits no edema. Lymphadenopathy:     He has no cervical adenopathy. Right: No supraclavicular adenopathy present. Left: No supraclavicular adenopathy present. Neurological: He is alert and oriented to person, place, and time. No sensory deficit. Skin: Skin is warm, dry and intact. No rash noted. Psychiatric: He has a normal mood and affect. His behavior is normal.   Nursing note and vitals reviewed.     Lab Results   Component Value Date/Time    Cholesterol, total 143 03/03/2017 08:50 AM    HDL Cholesterol 49 03/03/2017 08:50 AM    LDL, calculated 69 03/03/2017 08:50 AM    VLDL, calculated 25 03/03/2017 08:50 AM    Triglyceride 127 03/03/2017 08:50 AM    CHOL/HDL Ratio 2.7 07/09/2010 09:43 AM     Lab Results   Component Value Date/Time    Sodium 141 03/03/2017 08:50 AM    Potassium 4.0 03/03/2017 08:50 AM    Chloride 101 03/03/2017 08:50 AM    CO2 25 03/03/2017 08:50 AM    Anion gap 7 09/20/2016 03:33 AM    Glucose 105 03/03/2017 08:50 AM    BUN 12 03/03/2017 08:50 AM    Creatinine 1.09 03/03/2017 08:50 AM    BUN/Creatinine ratio 11 03/03/2017 08:50 AM    GFR est AA 75 03/03/2017 08:50 AM    GFR est non-AA 65 03/03/2017 08:50 AM    Calcium 9.3 03/03/2017 08:50 AM    Bilirubin, total 0.9 03/03/2017 08:50 AM    AST (SGOT) 27 03/03/2017 08:50 AM    Alk. phosphatase 67 03/03/2017 08:50 AM    Protein, total 6.6 03/03/2017 08:50 AM    Albumin 4.3 03/03/2017 08:50 AM    Globulin 3.9 01/27/2014 11:30 AM    A-G Ratio 1.9 03/03/2017 08:50 AM    ALT (SGPT) 28 03/03/2017 08:50 AM     Lab Results   Component Value Date/Time    Hemoglobin A1c 6.0 09/16/2016 11:09 AM    Hemoglobin A1c (POC) 5.5 04/17/2017 10:15 AM       ASSESSMENT and PLAN    ICD-10-CM ICD-9-CM    1. Preop general physical exam Z01.818 V72.83    2. Age-related cataract of both eyes, unspecified age-related cataract type H25.9 366.10    3. Screening for depression Z13.89 V79.0 [facility] DEPRESSION SCREEN ANNUAL   4. Benign hypertension with chronic kidney disease, stage III I12.9 403.10 amLODIPine (NORVASC) 10 mg tablet    N18.3 585.3    5. Coronary artery disease involving native coronary artery of native heart without angina pectoris I25.10 414.01    6. Hyperlipidemia, mixed E78.2 272.2    7. Glucose intolerance (impaired glucose tolerance) R73.02 790.22    8. Lumbar stenosis with neurogenic claudication M48.06 724.03    9. Unequal blood pressure in upper extremities R09.89 796.4      Diagnoses and all orders for this visit:    1. Preop general physical exam  Medical conditions are optimized for planned surgery. 2. Age-related cataract of both eyes, unspecified age-related cataract type    3. Screening for depression  -     NH DEPRESSION SCREEN ANNUAL    4. Benign hypertension with chronic kidney disease, stage III  -     amLODIPine (NORVASC) 10 mg tablet; Take 1 Tab by mouth daily. Indications: hypertension    5. Coronary artery disease involving native coronary artery of native heart without angina pectoris    6. Hyperlipidemia, mixed  Hyperlipidemia is controlled    7. Glucose intolerance (impaired glucose tolerance)    8.  Lumbar stenosis with neurogenic claudication  Improved after epidural steroid injections and starting duloxetine. 9. Unequal blood pressure in upper extremities      Follow-up Disposition:  Return in about 1 month (around 9/15/2017) for HTN.  lab results and schedule of future lab studies reviewed with patient  reviewed diet, exercise and weight control  cardiovascular risk and specific lipid/LDL goals reviewed  Discussed the patient's BMI with him. The BMI follow up plan is as follows: I have counseled this patient on diet and exercise regimens  I have discussed the diagnosis with the patient and the intended plan as seen in the above orders. Patient is in agreement. The patient has received an after-visit summary and questions were answered concerning future plans. I have discussed medication side effects and warnings with the patient as well.

## 2017-08-15 NOTE — PATIENT INSTRUCTIONS
Increase amlodipine to 10 mg  Daily  Return in one month     Low Sodium Diet (2,000 Milligram): Care Instructions  Your Care Instructions  Too much sodium causes your body to hold on to extra water. This can raise your blood pressure and force your heart and kidneys to work harder. In very serious cases, this could cause you to be put in the hospital. It might even be life-threatening. By limiting sodium, you will feel better and lower your risk of serious problems. The most common source of sodium is salt. People get most of the salt in their diet from canned, prepared, and packaged foods. Fast food and restaurant meals also are very high in sodium. Your doctor will probably limit your sodium to less than 2,000 milligrams (mg) a day. This limit counts all the sodium in prepared and packaged foods and any salt you add to your food. Follow-up care is a key part of your treatment and safety. Be sure to make and go to all appointments, and call your doctor if you are having problems. It's also a good idea to know your test results and keep a list of the medicines you take. How can you care for yourself at home? Read food labels  · Read labels on cans and food packages. The labels tell you how much sodium is in each serving. Make sure that you look at the serving size. If you eat more than the serving size, you have eaten more sodium. · Food labels also tell you the Percent Daily Value for sodium. Choose products with low Percent Daily Values for sodium. · Be aware that sodium can come in forms other than salt, including monosodium glutamate (MSG), sodium citrate, and sodium bicarbonate (baking soda). MSG is often added to Asian food. When you eat out, you can sometimes ask for food without MSG or added salt. Buy low-sodium foods  · Buy foods that are labeled \"unsalted\" (no salt added), \"sodium-free\" (less than 5 mg of sodium per serving), or \"low-sodium\" (less than 140 mg of sodium per serving).  Foods labeled \"reduced-sodium\" and \"light sodium\" may still have too much sodium. Be sure to read the label to see how much sodium you are getting. · Buy fresh vegetables, or frozen vegetables without added sauces. Buy low-sodium versions of canned vegetables, soups, and other canned goods. Prepare low-sodium meals  · Cut back on the amount of salt you use in cooking. This will help you adjust to the taste. Do not add salt after cooking. One teaspoon of salt has about 2,300 mg of sodium. · Take the salt shaker off the table. · Flavor your food with garlic, lemon juice, onion, vinegar, herbs, and spices. Do not use soy sauce, lite soy sauce, steak sauce, onion salt, garlic salt, celery salt, mustard, or ketchup on your food. · Use low-sodium salad dressings, sauces, and ketchup. Or make your own salad dressings and sauces without adding salt. · Use less salt (or none) when recipes call for it. You can often use half the salt a recipe calls for without losing flavor. Other foods such as rice, pasta, and grains do not need added salt. · Rinse canned vegetables, and cook them in fresh water. This removes somebut not allof the salt. · Avoid water that is naturally high in sodium or that has been treated with water softeners, which add sodium. Call your local water company to find out the sodium content of your water supply. If you buy bottled water, read the label and choose a sodium-free brand. Avoid high-sodium foods  · Avoid eating:  ¨ Smoked, cured, salted, and canned meat, fish, and poultry. ¨ Ham, garcia, hot dogs, and luncheon meats. ¨ Regular, hard, and processed cheese and regular peanut butter. ¨ Crackers with salted tops, and other salted snack foods such as pretzels, chips, and salted popcorn. ¨ Frozen prepared meals, unless labeled low-sodium. ¨ Canned and dried soups, broths, and bouillon, unless labeled sodium-free or low-sodium. ¨ Canned vegetables, unless labeled sodium-free or low-sodium.   Chantell Vasquez [facility] fries, pizza, tacos, and other fast foods. ¨ Pickles, olives, ketchup, and other condiments, especially soy sauce, unless labeled sodium-free or low-sodium. Where can you learn more? Go to http://bridget-primo.info/. Enter E752 in the search box to learn more about \"Low Sodium Diet (2,000 Milligram): Care Instructions. \"  Current as of: July 26, 2016  Content Version: 11.3  © 7914-4137 [email or web address]. Care instructions adapted under license by [employer] (which disclaims liability or warranty for this information). If you have questions about a medical condition or this instruction, always ask your healthcare professional. [address] any warranty or liability for your use of this information. How to Read a Food Label to Limit Sodium: Care Instructions  Your Care Instructions  Sodium causes your body to hold on to extra water. This can raise your blood pressure and force your heart and kidneys to work harder. In very serious cases, this could cause you to be put in the hospital. It might even be life-threatening. By limiting sodium, you will feel better and lower your risk of serious problems. Processed foods, fast food, and restaurant foods are the major sources of dietary sodium. The most common name for sodium is salt. Try to limit how much sodium you eat to less than 2,300 milligrams (mg) a day. If you limit your sodium to 1,500 mg a day, you can lower your blood pressure even more. This limit counts all the salt that you eat in foods you cook or in packaged foods. Keep a list of everything you eat and drink. Follow-up care is a key part of your treatment and safety. Be sure to make and go to all appointments, and call your doctor if you are having problems. It's also a good idea to know your test results and keep a list of the medicines you take. How can you care for yourself at home?   Read ingredient lists on food labels  · Read the list of ingredients on food labels to help you find how much sodium is in a food. The label lists the ingredients in a food in descending order (from the most to the least). If salt or sodium is high on the list, there may be a lot of sodium in the food. · Know that sodium has different names. Sodium is also called monosodium glutamate (MSG, common in [facility] food), sodium citrate, sodium alginate, sodium hydroxide, and sodium phosphate. Read Nutrition Facts labels  · On most foods, there is a Nutrition Facts label. This will tell you how much sodium is in one serving of food. Look at both the serving size and the sodium amount. The serving size is located at the top of the label, usually right under the \"Nutrition Facts\" title. The amount of sodium is given in the list under the title. It is given in milligrams (mg). ¨ Check the serving size carefully. A single serving is often very small, and you may eat more than one serving. If this is the case, you will eat more sodium than listed on the label. For example, if the serving size for a canned soup is 1 cup and the sodium amount is 470 mg, if you have 2 cups you will eat 940 mg of sodium. · The nutrition facts for fresh fruits and vegetables are not listed on the food. They may be listed somewhere in the store. These foods usually have no sodium or low sodium. · The Nutrition Facts label also gives you the Percent Daily Value for sodium. This is how much of the recommended amount of sodium a serving contains. The daily value for sodium is less than 2,300 mg. So if the Percent Daily Value says 50%, this means one serving is giving you half of this, or 1,150 mg. Buy low-sodium foods  · Look for foods that are made with less sodium. Watch for the following words on the label. ¨ \"Unsalted\" means there is no sodium added to the food. But there may be sodium already in the food naturally. ¨ \"Sodium-free\" means a serving has less than 5 mg of sodium.   ¨ \"Very low sodium\" means a serving has 35 mg or less of sodium. ¨ \"Low-sodium\" means a serving has 140 mg or less of sodium. · \"Reduced-sodium\" means that there is 25% less sodium than what the food normally has. This is still usually too much sodium. Try not to buy foods with this on the label. · Buy fresh vegetables, or frozen vegetables without added sauces. Buy low-sodium versions of canned vegetables, soups, and other canned goods. Where can you learn more? Go to http://bridget-primo.info/. Enter 26 [id number] in the search box to learn more about \"How to Read a Food Label to Limit Sodium: Care Instructions. \"  Current as of: July 26, 2016  Content Version: 11.3  © 2601-6000 [employer]. Care instructions adapted under license by [employer] (which disclaims liability or warranty for this information). If you have questions about a medical condition or this instruction, always ask your healthcare professional. [place] [address] any warranty or liability for your use of this information.

## 2017-08-15 NOTE — MR AVS SNAPSHOT
Visit Information Date & Time Provider Department Dept. Phone Encounter #  
 8/15/2017  8:15 AM MD Mirna Hernandez [phone] [id number] Follow-up Instructions Return in about 1 month (around 9/15/2017) for HTN. Routing History Your Appointments 9/18/2017 10:00 AM  
LAB with LAB TT Mirna Brown [address] [place] [address]) Appt Note: NON-fasting lab  
 [address] [facility] [address] [phone]  
  
   
 [address] [facility]  
  
    
 9/29/2017 10:00 AM  
ACUTE CARE with MD Mirna Hernandez [address] [place] [address]) Appt Note: 4mth f/u; HTN, gluc, CKD  NON-fasting lab one week prior; 4mth f/u; HTN, gluc, CKD NON-fasting lab one week prior - r/s from PCP out 9/25/17  
 [address] [facility] [address] [phone]  
  
   
 [address] [facility] [address] Upcoming Health Maintenance Date Due DTaP/Tdap/Td series (1 - Tdap) 9/6/1960 ZOSTER VACCINE AGE 60> 7/6/1999 INFLUENZA AGE 9 TO ADULT 8/1/2017 COLONOSCOPY 1/1/2018 MEDICARE YEARLY EXAM 3/11/2018 GLAUCOMA SCREENING Q2Y 4/13/2019 Allergies as of 8/15/2017  Review Complete On: 8/15/2017 By: Suad Muro MD  
  
 Severity Noted Reaction Type Reactions Percocet [Oxycodone-acetaminophen]  09/22/2009    Other (comments) \"feel weird\" Current Immunizations  Reviewed on 8/15/2017 Name Date Influenza High Dose Vaccine PF 9/14/2015 Influenza Vaccine (Quad) PF 9/9/2016 Pneumococcal Conjugate (PCV-13) 3/12/2015 ZZZ-RETIRED (DO NOT USE) Pneumococcal Vaccine (Unspecified Type) 4/22/2010 Reviewed by Santy Land LPN on 4/41/2262 at  8:32 AM  
 Reviewed by Santy Land LPN on 6/90/5119 at  8:32 AM  
You Were Diagnosed With   
  
 Codes Comments  Preop general physical exam    -  Primary ICD-10-CM: M54.109 
 ICD-9-CM: V72.83 Age-related cataract of both eyes, unspecified age-related cataract type     ICD-10-CM: H25.9 ICD-9-CM: 366.10 Screening for depression     ICD-10-CM: Z13.89 ICD-9-CM: V79.0 Benign hypertension with chronic kidney disease, stage III     ICD-10-CM: I12.9, N18.3 ICD-9-CM: 403.10, 585.3 Vitals BP Pulse Temp Resp Height(growth percentile) Weight(growth percentile) 146/78 (BP 1 Location: Left arm, BP Patient Position: Sitting) 62 98.3 °F (36.8 °C) (Oral) 14 5' 8\" (1.727 m) 203 lb 8 oz (92.3 kg) SpO2 BMI Smoking Status 96% 30.94 kg/m2 Never Smoker Vitals History BMI and BSA Data Body Mass Index Body Surface Area 30.94 kg/m 2 2.1 m 2 Preferred Pharmacy Pharmacy Name Phone [facility] [place] [place] [facility]ene [phone] Your Updated Medication List  
  
   
This list is accurate as of: 8/15/17  9:02 AM.  Always use your most recent med list. amLODIPine 10 mg tablet Commonly known as:  Ari Korina Take 1 Tab by mouth daily. Indications: hypertension  
  
 aspirin 81 mg chewable tablet Take 1 Tab by mouth nightly. atorvastatin 80 mg tablet Commonly known as:  LIPITOR  
TAKE ONE TABLET ONCE DAILY  
  
 carvedilol 12.5 mg tablet Commonly known as:  Wilfredo Lacrosse Take 1 Tab by mouth two (2) times daily (with meals). cholecalciferol 1,000 unit Cap Commonly known as:  VITAMIN D3 Take  by mouth nightly. DULoxetine 60 mg capsule Commonly known as:  CYMBALTA Take 1 Cap by mouth daily. gabapentin 300 mg capsule Commonly known as:  NEURONTIN Take 1 Cap by mouth three (3) times daily. lisinopril-hydroCHLOROthiazide 20-12.5 mg per tablet Commonly known as:  PRINZIDE, ZESTORETIC  
TAKE TWO TABLETS ONCE DAILY  
  
 moxifloxacin 0.5 % ophthalmic solution Commonly known as:  VIGAMOX  
instill 1 drop into right eye four times a day until finished - S. .. (REFER TO PRESCRIPTION NOTES). Start taking on:  8/30/2017  
  
 prednisoLONE acetate 1 % ophthalmic suspension Commonly known as:  PRED FORTE  
instill 1 drop into right eye four times a day until finished - START THIS DROP AFTER SURGERY Start taking on:  8/30/2017 Prescriptions Sent to Pharmacy Refills  
 amLODIPine (NORVASC) 10 mg tablet 11 Sig: Take 1 Tab by mouth daily. Indications: hypertension Class: Normal  
 Pharmacy: [address] [place] [address], [facility] Adriano Rene  #: [phone] Route: Oral  
  
We Performed the Following [address] [employer] [ [facility]] Follow-up Instructions Return in about 1 month (around 9/15/2017) for HTN. Patient Instructions Increase amlodipine to 10 mg  Daily Return in one month Low Sodium Diet (2,000 Milligram): Care Instructions Your Care Instructions Too much sodium causes your body to hold on to extra water. This can raise your blood pressure and force your heart and kidneys to work harder. In very serious cases, this could cause you to be put in the hospital. It might even be life-threatening. By limiting sodium, you will feel better and lower your risk of serious problems. The most common source of sodium is salt. People get most of the salt in their diet from canned, prepared, and packaged foods. Fast food and restaurant meals also are very high in sodium. Your doctor will probably limit your sodium to less than 2,000 milligrams (mg) a day. This limit counts all the sodium in prepared and packaged foods and any salt you add to your food. Follow-up care is a key part of your treatment and safety. Be sure to make and go to all appointments, and call your doctor if you are having problems. It's also a good idea to know your test results and keep a list of the medicines you take. How can you care for yourself at home? Read food labels · Read labels on cans and food packages. The labels tell you how much sodium is in each serving. Make sure that you look at the serving size. If you eat more than the serving size, you have eaten more sodium. · Food labels also tell you the Percent Daily Value for sodium. Choose products with low Percent Daily Values for sodium. · Be aware that sodium can come in forms other than salt, including monosodium glutamate (MSG), sodium citrate, and sodium bicarbonate (baking soda). MSG is often added to Asian food. When you eat out, you can sometimes ask for food without MSG or added salt. Buy low-sodium foods · Buy foods that are labeled \"unsalted\" (no salt added), \"sodium-free\" (less than 5 mg of sodium per serving), or \"low-sodium\" (less than 140 mg of sodium per serving). Foods labeled \"reduced-sodium\" and \"light sodium\" may still have too much sodium. Be sure to read the label to see how much sodium you are getting. · Buy fresh vegetables, or frozen vegetables without added sauces. Buy low-sodium versions of canned vegetables, soups, and other canned goods. Prepare low-sodium meals · Cut back on the amount of salt you use in cooking. This will help you adjust to the taste. Do not add salt after cooking. One teaspoon of salt has about 2,300 mg of sodium. · Take the salt shaker off the table. · Flavor your food with garlic, lemon juice, onion, vinegar, herbs, and spices. Do not use soy sauce, lite soy sauce, steak sauce, onion salt, garlic salt, celery salt, mustard, or ketchup on your food. · Use low-sodium salad dressings, sauces, and ketchup. Or make your own salad dressings and sauces without adding salt. · Use less salt (or none) when recipes call for it. You can often use half the salt a recipe calls for without losing flavor. Other foods such as rice, pasta, and grains do not need added salt. · Rinse canned vegetables, and cook them in fresh water. This removes somebut not allof the salt. · Avoid water that is naturally high in sodium or that has been treated with water softeners, which add sodium. Call your local water company to find out the sodium content of your water supply. If you buy bottled water, read the label and choose a sodium-free brand. Avoid high-sodium foods · Avoid eating: ¨ Smoked, cured, salted, and canned meat, fish, and poultry. ¨ Ham, garcia, hot dogs, and luncheon meats. ¨ Regular, hard, and processed cheese and regular peanut butter. ¨ Crackers with salted tops, and other salted snack foods such as pretzels, chips, and salted popcorn. ¨ Frozen prepared meals, unless labeled low-sodium. ¨ Canned and dried soups, broths, and bouillon, unless labeled sodium-free or low-sodium. ¨ Canned vegetables, unless labeled sodium-free or low-sodium. ¨ [employer] fries, pizza, tacos, and other fast foods. ¨ Pickles, olives, ketchup, and other condiments, especially soy sauce, unless labeled sodium-free or low-sodium. Where can you learn more? Go to http://bridget-primo.info/. Enter X836 in the search box to learn more about \"Low Sodium Diet (2,000 Milligram): Care Instructions. \" Current as of: July 26, 2016 Content Version: 11.3 © 7072-0658 [employer]. Care instructions adapted under license by [employer] (which disclaims liability or warranty for this information). If you have questions about a medical condition or this instruction, always ask your healthcare professional. [place] [address] any warranty or liability for your use of this information. How to Read a Food Label to Limit Sodium: Care Instructions Your Care Instructions Sodium causes your body to hold on to extra water. This can raise your blood pressure and force your heart and kidneys to work harder.  In very serious cases, this could cause you to be put in the hospital. It might even be life-threatening. By limiting sodium, you will feel better and lower your risk of serious problems. Processed foods, fast food, and restaurant foods are the major sources of dietary sodium. The most common name for sodium is salt. Try to limit how much sodium you eat to less than 2,300 milligrams (mg) a day. If you limit your sodium to 1,500 mg a day, you can lower your blood pressure even more. This limit counts all the salt that you eat in foods you cook or in packaged foods. Keep a list of everything you eat and drink. Follow-up care is a key part of your treatment and safety. Be sure to make and go to all appointments, and call your doctor if you are having problems. It's also a good idea to know your test results and keep a list of the medicines you take. How can you care for yourself at home? Read ingredient lists on food labels · Read the list of ingredients on food labels to help you find how much sodium is in a food. The label lists the ingredients in a food in descending order (from the most to the least). If salt or sodium is high on the list, there may be a lot of sodium in the food. · Know that sodium has different names. Sodium is also called monosodium glutamate (MSG, common in [facility] food), sodium citrate, sodium alginate, sodium hydroxide, and sodium phosphate. Read Nutrition Facts labels · On most foods, there is a Nutrition Facts label. This will tell you how much sodium is in one serving of food. Look at both the serving size and the sodium amount. The serving size is located at the top of the label, usually right under the \"Nutrition Facts\" title. The amount of sodium is given in the list under the title. It is given in milligrams (mg). ¨ Check the serving size carefully. A single serving is often very small, and you may eat more than one serving. If this is the case, you will eat more sodium than listed on the label.  For example, if the serving size for a canned soup is 1 cup and the sodium amount is 470 mg, if you have 2 cups you will eat 940 mg of sodium. · The nutrition facts for fresh fruits and vegetables are not listed on the food. They may be listed somewhere in the store. These foods usually have no sodium or low sodium. · The Nutrition Facts label also gives you the Percent Daily Value for sodium. This is how much of the recommended amount of sodium a serving contains. The daily value for sodium is less than 2,300 mg. So if the Percent Daily Value says 50%, this means one serving is giving you half of this, or 1,150 mg. Buy low-sodium foods · Look for foods that are made with less sodium. Watch for the following words on the label. ¨ \"Unsalted\" means there is no sodium added to the food. But there may be sodium already in the food naturally. ¨ \"Sodium-free\" means a serving has less than 5 mg of sodium. ¨ \"Very low sodium\" means a serving has 35 mg or less of sodium. ¨ \"Low-sodium\" means a serving has 140 mg or less of sodium. · \"Reduced-sodium\" means that there is 25% less sodium than what the food normally has. This is still usually too much sodium. Try not to buy foods with this on the label. · Buy fresh vegetables, or frozen vegetables without added sauces. Buy low-sodium versions of canned vegetables, soups, and other canned goods. Where can you learn more? Go to http://bridget-primo.info/. Enter 26 [id number] in the search box to learn more about \"How to Read a Food Label to Limit Sodium: Care Instructions. \" Current as of: July 26, 2016 Content Version: 11.3 © 5254-8417 [employer]. Care instructions adapted under license by [employer] (which disclaims liability or warranty for this information). If you have questions about a medical condition or this instruction, always ask your healthcare professional. [address] any warranty or liability for your use of this information. Introducing \[facility]\"" & HEALTH SERVICES! Janak Kiara introduces [employer] patient portal. Now you can access parts of your medical record, email your doctor's office, and request medication refills online. 1. In your internet browser, go to [email or web address] 2. Click on the First Time User? Click Here link in the Sign In box. You will see the New Member Sign Up page. 3. Enter your [employer] Access Code exactly as it appears below. You will not need to use this code after youve completed the sign-up process. If you do not sign up before the expiration date, you must request a new code. · [employer] Access Code: [email or web address] Expires: 10/29/2017 10:38 AM 
 
4. Enter the last four digits of your Social Security Number (xxxx) and Date of Birth (mm/dd/yyyy) as indicated and click Submit. You will be taken to the next sign-up page. 5. Create a [employer] ID. This will be your [employer] login ID and cannot be changed, so think of one that is secure and easy to remember. 6. Create a [employer] password. You can change your password at any time. 7. Enter your Password Reset Question and Answer. This can be used at a later time if you forget your password. 8. Enter your e-mail address. You will receive e-mail notification when new information is available in [address] 9. Click Sign Up. You can now view and download portions of your medical record. 10. Click the Download Summary menu link to download a portable copy of your medical information. If you have questions, please visit the Frequently Asked Questions section of the [employer] website. Remember, [employer] is NOT to be used for urgent needs. For medical emergencies, dial 911. Now available from your iPhone and Android! Please provide this summary of care documentation to your next provider. Your primary care clinician is listed as Cathalene Goodpasture. If you have any questions after today's visit, please call [phone].

## 2017-08-15 NOTE — PROGRESS NOTES
Reviewed record In preparation for visit and have obtained necessary documentation. Has info on advanced directive but has not filled them out. 1. Have you been to the ER, urgent care clinic or hospitalized since your last visit? No     2. Have you seen or consulted any other health care providers outside of the [address] [place] [address] since your last visit? Include any pap smears or colon screening. Dr Brandon Pac reviewed with provider.     Health Maintenance reviewed:

## 2017-09-11 NOTE — PERIOP NOTES
Message from [facility] front office staff. Pt called [facility] and told front office staff that he was unable to leave a message with Dr. Michelle baker to notify them that he does not have his preop eye drops.   [facility] front office staff called Dr. Michelle Partida office and LM on VM re above, and provided pt's contact number

## 2017-09-12 ENCOUNTER — ANESTHESIA EVENT (OUTPATIENT)
Dept: SURGERY | Age: 77
End: 2017-09-12
Payer: MEDICARE

## 2017-09-12 RX ORDER — DICLOFENAC SODIUM 1 MG/ML
1 SOLUTION/ DROPS OPHTHALMIC
Status: CANCELLED | OUTPATIENT
Start: 2017-09-13

## 2017-09-12 RX ORDER — ACETAMINOPHEN 500 MG
1000 TABLET ORAL
COMMUNITY

## 2017-09-12 RX ORDER — KETOROLAC TROMETHAMINE 5 MG/ML
1 SOLUTION OPHTHALMIC
Status: COMPLETED | OUTPATIENT
Start: 2017-09-13 | End: 2017-09-13

## 2017-09-12 NOTE — PERIOP NOTES
[facility]  Ambulatory Surgery Unit  Pre-operative Instructions    Surgery/Procedure Date  9/13/17            Tentative Arrival Time 0645      1. On the day of your surgery/procedure, please report to the Ambulatory Surgery Unit Registration Desk and sign in at your designated time. The Ambulatory Surgery Unit is located in [facility] on the [facility] side of the [facility] across from the [address] [place] [address]. Please have all of your health insurance cards and a photo ID. 2. You must have someone with you to drive you home, as you should not drive a car for 24 hours following anesthesia. Please make arrangements for a responsible adult friend or family member to stay with you for at least the first 24 hours after your surgery. 3. Do not have anything to eat or drink (including water, gum, mints, coffee, juice) after midnight   9/12/17. This may not apply to medications prescribed by your physician. (Please note below the special instructions with medications to take the morning of surgery, if applicable.)    4. We recommend you do not drink any alcoholic beverages for 24 hours before and after your surgery. 5. Stop all Aspirin, non-steroidal anti-inflammatory drugs (i.e. Advil, Aleve), vitamins, and supplements as directed by your surgeon's office. **If you are currently taking Plavix, Coumadin, or other blood-thinning agents, contact your surgeon for instructions. **    6. In an effort to help prevent surgical site infection, we ask that you shower with an anti-bacterial soap (i.e. Dial or Safeguard) for 3 days prior to and on the morning of surgery, using a fresh towel after each shower. (Please begin this process with fresh bed linens.) Do not apply any lotions, powders, or deodorants after the shower on the day of your procedure. If applicable, please do not shave the operative site for 48 hours prior to surgery. 7. Wear comfortable clothes.  Wear glasses instead of contacts. Do not bring any jewelry or money (other than copays or fees as instructed). Do not wear make-up, particularly mascara, the morning of your surgery. Do not wear nail polish, particularly if you are having foot /hand surgery. Wear your hair loose or down, no ponytails, buns, rebeka pins or clips. All body piercings must be removed. 8. You should understand that if you do not follow these instructions your surgery may be cancelled. If your physical condition changes (i.e. fever, cold or flu) please contact your surgeon as soon as possible. 9. It is important that you be on time. If a situation occurs where you may be late, or if you have any questions or problems, please call [phone].    10. Your surgery time may be subject to change. You will receive a phone call the day prior to surgery to confirm your arrival time. 11. Pediatric patients: please bring a change of clothes, diapers, bottle/sippy cup, pacifier, etc.      Special Instructions: Take all medications and inhalers, as prescribed, on the morning of surgery with a sip of water EXCEPT: None      I understand a pre-operative phone call will be made to verify my surgery time. In the event that I am not available, I give permission for a message to be left on my answering service and/or with another person? Yes    Instructions given to patient during pat phone call.  Patient verbalized understanding.         ___________________      ___________________      ________________  (Signature of Patient)          (Witness)                   (Date and Time)

## 2017-09-13 ENCOUNTER — ANESTHESIA (OUTPATIENT)
Dept: SURGERY | Age: 77
End: 2017-09-13
Payer: MEDICARE

## 2017-09-13 ENCOUNTER — HOSPITAL ENCOUNTER (OUTPATIENT)
Age: 77
Setting detail: OUTPATIENT SURGERY
Discharge: HOME | End: 2017-09-13
Attending: OPHTHALMOLOGY | Admitting: OPHTHALMOLOGY
Payer: MEDICARE

## 2017-09-13 VITALS
WEIGHT: 199 LBS | SYSTOLIC BLOOD PRESSURE: 129 MMHG | DIASTOLIC BLOOD PRESSURE: 77 MMHG | RESPIRATION RATE: 21 BRPM | OXYGEN SATURATION: 95 % | TEMPERATURE: 97.7 F | BODY MASS INDEX: 30.16 KG/M2 | HEART RATE: 73 BPM | HEIGHT: 68 IN

## 2017-09-13 PROCEDURE — 76060000061 HC AMB SURG ANES 0.5 TO 1 HR: Performed by: OPHTHALMOLOGY

## 2017-09-13 PROCEDURE — 74011000250 HC RX REV CODE- 250

## 2017-09-13 PROCEDURE — 74011000250 HC RX REV CODE- 250: Performed by: OPHTHALMOLOGY

## 2017-09-13 PROCEDURE — 76210000046 HC AMBSU PH II REC FIRST 0.5 HR: Performed by: OPHTHALMOLOGY

## 2017-09-13 PROCEDURE — 74011250636 HC RX REV CODE- 250/636: Performed by: OPHTHALMOLOGY

## 2017-09-13 PROCEDURE — V2632 POST CHMBR INTRAOCULAR LENS: HCPCS | Performed by: OPHTHALMOLOGY

## 2017-09-13 PROCEDURE — 76030000000 HC AMB SURG OR TIME 0.5 TO 1: Performed by: OPHTHALMOLOGY

## 2017-09-13 PROCEDURE — 74011250636 HC RX REV CODE- 250/636

## 2017-09-13 PROCEDURE — 77030013854 HC PK PHACO KT ALCN -C: Performed by: OPHTHALMOLOGY

## 2017-09-13 PROCEDURE — 77030018846 HC SOL IRR STRL H20 ICUM -A: Performed by: OPHTHALMOLOGY

## 2017-09-13 DEVICE — LENS IOL POST 1-PC 6X13 21.5 -- ACRYSOF: Type: IMPLANTABLE DEVICE | Site: EYE | Status: FUNCTIONAL

## 2017-09-13 RX ORDER — KETOROLAC TROMETHAMINE 5 MG/ML
SOLUTION OPHTHALMIC
Status: COMPLETED
Start: 2017-09-13 | End: 2017-09-13

## 2017-09-13 RX ORDER — ONDANSETRON 2 MG/ML
4 INJECTION INTRAMUSCULAR; INTRAVENOUS AS NEEDED
Status: DISCONTINUED | OUTPATIENT
Start: 2017-09-13 | End: 2017-09-13 | Stop reason: HOSPADM

## 2017-09-13 RX ORDER — SODIUM CHLORIDE, SODIUM LACTATE, POTASSIUM CHLORIDE, CALCIUM CHLORIDE 600; 310; 30; 20 MG/100ML; MG/100ML; MG/100ML; MG/100ML
25 INJECTION, SOLUTION INTRAVENOUS CONTINUOUS
Status: DISCONTINUED | OUTPATIENT
Start: 2017-09-13 | End: 2017-09-13 | Stop reason: HOSPADM

## 2017-09-13 RX ORDER — SODIUM CHLORIDE 0.9 % (FLUSH) 0.9 %
5-10 SYRINGE (ML) INJECTION EVERY 8 HOURS
Status: DISCONTINUED | OUTPATIENT
Start: 2017-09-13 | End: 2017-09-13 | Stop reason: HOSPADM

## 2017-09-13 RX ORDER — MIDAZOLAM HYDROCHLORIDE 1 MG/ML
INJECTION, SOLUTION INTRAMUSCULAR; INTRAVENOUS AS NEEDED
Status: DISCONTINUED | OUTPATIENT
Start: 2017-09-13 | End: 2017-09-13 | Stop reason: HOSPADM

## 2017-09-13 RX ORDER — PREDNISOLONE ACETATE 10 MG/ML
1 SUSPENSION/ DROPS OPHTHALMIC 2 TIMES DAILY
Status: DISCONTINUED | OUTPATIENT
Start: 2017-09-13 | End: 2017-09-13 | Stop reason: HOSPADM

## 2017-09-13 RX ORDER — PROPARACAINE HYDROCHLORIDE 5 MG/ML
1 SOLUTION/ DROPS OPHTHALMIC
Status: COMPLETED | OUTPATIENT
Start: 2017-09-13 | End: 2017-09-13

## 2017-09-13 RX ORDER — PHENYLEPHRINE HYDROCHLORIDE 25 MG/ML
1 SOLUTION/ DROPS OPHTHALMIC
Status: COMPLETED | OUTPATIENT
Start: 2017-09-13 | End: 2017-09-13

## 2017-09-13 RX ORDER — SODIUM CHLORIDE 0.9 % (FLUSH) 0.9 %
5-10 SYRINGE (ML) INJECTION AS NEEDED
Status: DISCONTINUED | OUTPATIENT
Start: 2017-09-13 | End: 2017-09-13 | Stop reason: HOSPADM

## 2017-09-13 RX ORDER — FENTANYL CITRATE 50 UG/ML
25 INJECTION, SOLUTION INTRAMUSCULAR; INTRAVENOUS
Status: DISCONTINUED | OUTPATIENT
Start: 2017-09-13 | End: 2017-09-13 | Stop reason: HOSPADM

## 2017-09-13 RX ORDER — DIPHENHYDRAMINE HYDROCHLORIDE 50 MG/ML
12.5 INJECTION, SOLUTION INTRAMUSCULAR; INTRAVENOUS AS NEEDED
Status: DISCONTINUED | OUTPATIENT
Start: 2017-09-13 | End: 2017-09-13 | Stop reason: HOSPADM

## 2017-09-13 RX ORDER — GENTAMICIN SULFATE 0.3 %
0.5 OINTMENT (GRAM) OPHTHALMIC (EYE) 3 TIMES DAILY
Status: DISCONTINUED | OUTPATIENT
Start: 2017-09-13 | End: 2017-09-13 | Stop reason: HOSPADM

## 2017-09-13 RX ORDER — CYCLOPENTOLATE HYDROCHLORIDE 20 MG/ML
1 SOLUTION/ DROPS OPHTHALMIC
Status: COMPLETED | OUTPATIENT
Start: 2017-09-13 | End: 2017-09-13

## 2017-09-13 RX ORDER — LIDOCAINE HYDROCHLORIDE 10 MG/ML
1 INJECTION, SOLUTION EPIDURAL; INFILTRATION; INTRACAUDAL; PERINEURAL ONCE
Status: COMPLETED | OUTPATIENT
Start: 2017-09-13 | End: 2017-09-13

## 2017-09-13 RX ORDER — LIDOCAINE HYDROCHLORIDE 40 MG/ML
3 INJECTION, SOLUTION RETROBULBAR; TOPICAL ONCE
Status: COMPLETED | OUTPATIENT
Start: 2017-09-13 | End: 2017-09-13

## 2017-09-13 RX ORDER — SODIUM CHLORIDE 9 MG/ML
25 INJECTION, SOLUTION INTRAVENOUS CONTINUOUS
Status: DISCONTINUED | OUTPATIENT
Start: 2017-09-13 | End: 2017-09-13 | Stop reason: HOSPADM

## 2017-09-13 RX ORDER — LIDOCAINE HYDROCHLORIDE 10 MG/ML
0.1 INJECTION, SOLUTION EPIDURAL; INFILTRATION; INTRACAUDAL; PERINEURAL AS NEEDED
Status: DISCONTINUED | OUTPATIENT
Start: 2017-09-13 | End: 2017-09-13 | Stop reason: HOSPADM

## 2017-09-13 RX ADMIN — PROPARACAINE HYDROCHLORIDE 1 DROP: 5 SOLUTION/ DROPS OPHTHALMIC at 08:26

## 2017-09-13 RX ADMIN — MIDAZOLAM HYDROCHLORIDE 1 MG: 1 INJECTION, SOLUTION INTRAMUSCULAR; INTRAVENOUS at 08:58

## 2017-09-13 RX ADMIN — PHENYLEPHRINE HYDROCHLORIDE 1 DROP: 25 SOLUTION/ DROPS OPHTHALMIC at 08:25

## 2017-09-13 RX ADMIN — KETOROLAC TROMETHAMINE 1 DROP: 5 SOLUTION OPHTHALMIC at 08:06

## 2017-09-13 RX ADMIN — PHENYLEPHRINE HYDROCHLORIDE 1 DROP: 25 SOLUTION/ DROPS OPHTHALMIC at 07:57

## 2017-09-13 RX ADMIN — PROPARACAINE HYDROCHLORIDE 1 DROP: 5 SOLUTION/ DROPS OPHTHALMIC at 08:04

## 2017-09-13 RX ADMIN — PROPARACAINE HYDROCHLORIDE 1 DROP: 5 SOLUTION/ DROPS OPHTHALMIC at 08:10

## 2017-09-13 RX ADMIN — CYCLOPENTOLATE HYDROCHLORIDE 1 DROP: 20 SOLUTION/ DROPS OPHTHALMIC at 08:04

## 2017-09-13 RX ADMIN — SODIUM CHLORIDE 25 ML/HR: 900 INJECTION, SOLUTION INTRAVENOUS at 08:03

## 2017-09-13 RX ADMIN — CYCLOPENTOLATE HYDROCHLORIDE 1 DROP: 20 SOLUTION/ DROPS OPHTHALMIC at 08:25

## 2017-09-13 RX ADMIN — KETOROLAC TROMETHAMINE 1 DROP: 5 SOLUTION OPHTHALMIC at 07:57

## 2017-09-13 RX ADMIN — CYCLOPENTOLATE HYDROCHLORIDE 1 DROP: 20 SOLUTION/ DROPS OPHTHALMIC at 08:10

## 2017-09-13 RX ADMIN — KETOROLAC TROMETHAMINE 1 DROP: 5 SOLUTION OPHTHALMIC at 08:25

## 2017-09-13 RX ADMIN — PROPARACAINE HYDROCHLORIDE 1 DROP: 5 SOLUTION/ DROPS OPHTHALMIC at 07:57

## 2017-09-13 RX ADMIN — KETOROLAC TROMETHAMINE 1 DROP: 5 SOLUTION OPHTHALMIC at 08:11

## 2017-09-13 RX ADMIN — PHENYLEPHRINE HYDROCHLORIDE 1 DROP: 25 SOLUTION/ DROPS OPHTHALMIC at 08:11

## 2017-09-13 RX ADMIN — PHENYLEPHRINE HYDROCHLORIDE 1 DROP: 25 SOLUTION/ DROPS OPHTHALMIC at 08:04

## 2017-09-13 RX ADMIN — PROPARACAINE HYDROCHLORIDE 1 DROP: 5 SOLUTION/ DROPS OPHTHALMIC at 08:25

## 2017-09-13 RX ADMIN — CYCLOPENTOLATE HYDROCHLORIDE 1 DROP: 20 SOLUTION/ DROPS OPHTHALMIC at 07:57

## 2017-09-13 NOTE — OP NOTES
Name: Heri Reed MASC-4        updated 3/1/2013  Description:  Hussain Alva with intraocular lens implant    PREOPERATIVE DIAGNOSIS: Visually impairing combined cataract, Right eye. POSTOPERATIVE DIAGNOSIS: Visually impairing combined   cataract,Right eye. OPERATION: Procedure(s):  CATARACT EXTRACTION WITH INTRA OCULAR LENS IMPLANT RIGHT EYE    ANESTHESIA: Topical with intravenous sedation    TYPE OF LENS IMPLANT USED:   Implant Name Type Inv. Item Serial No.  Lot No. LRB No. Used Action   LENS IOL POST 1-PC 6X13 21.5 -- [employer] - [id number]   LENS IOL POST 1-PC 6X13 21.5 -- [employer] [id number] [employer] NA Right 1 Implanted       PHACO TIME:   46 seconds at an average power of 19%. Estimated blood loss: None    Complications:None    Specimen removed: None    DESCRIPTION OF PROCEDURE:  The patient was brought to the holding area, where an IV was begun at the keep open rate. The patient received several instillations of Amish-Synephrine 2.5%, Cyclogyl 2%, and tetracaine 0.5% until adequate pupillary dilatation was achieved. The patient was connected to cardiovascular monitoring. Prior to being brought back to the operating suite, the patient received 2 drops of Betadine 5% solution into the inferior cul-de-sac of the operated eye. The patient was then brought back to the operating suite, and prepped and draped in the usual sterile manner after being re-connnected to cardiovascular monitoring. One drop of 4% Xylocaine was then instilled onto the eye. The lid speculum was set into position and the operating microscope was focused on the patient. Two drops of 4% Xylocaine were again instilled onto the eye. Using the fixation ring, the sharp 15-degree blade was used to create a paracentesis site and 1% MPF Xylocaine was instilled into the anterior chamber for anesthesia. Viscoelastic was then instilled into the anterior chamber.  The 2.4 mm keratome was then utilized to create a clear corneal incision, and a circular capsulorrhexis was performed. BSS was utilized to perform careful hydrodissection of the lens. Viscoelastic was then instilled into the anterior chamber to protect the corneal endothelium. Phacoemulsification was then carried out. Any remaining cortical material was removed in irrigation/aspiration mode. Viscoelastic was then instilled into the capsular bag. The lens implant was inspected for any defects. After finding none, the lens was placed in its injector and inserted into the capsular bag. The lens implant was centered on the patient's visual/astigmatic axis. The viscoelastic was then removed from the eye. Miochol was instilled posterior to the iris plane to facilitate pupillary miosis. The wound was checked for any leaks, after finding none, Iopidine and Pred Forte drops were instilled into the inferior cul-de-sac, and gentamicin ointment was placed over the cornea. A semi-pressure dressing and shield were then placed for the patient. The patient tolerated the procedure very well, and was brought to the recovery room in an alert and stable and satisfactory postoperative condition. Instructions were given to the patient and their family for their immediate postoperative care.   [address] [place] [address],   9/13/2017

## 2017-09-13 NOTE — ANESTHESIA PREPROCEDURE EVALUATION
Anesthetic History     PONV (x1)          Review of Systems / Medical History  Patient summary reviewed, nursing notes reviewed and pertinent labs reviewed    Pulmonary  Within defined limits                 Neuro/Psych   Within defined limits           Cardiovascular    Hypertension        Dysrhythmias : atrial fibrillation  Past MI (2013), CAD, CABG (2013) and hyperlipidemia    Exercise tolerance: >4 METS     GI/Hepatic/Renal         Renal disease (CKD stage III): CRI       Endo/Other        Arthritis     Other Findings   Comments: H/o back fusion  glaucoma           Physical Exam    Airway  Mallampati: III  TM Distance: 4 - 6 cm  Neck ROM: normal range of motion   Mouth opening: Normal     Cardiovascular    Rhythm: regular  Rate: normal      Pertinent negatives: No murmur   Dental  No notable dental hx       Pulmonary  Breath sounds clear to auscultation               Abdominal  GI exam deferred       Other Findings            Anesthetic Plan    ASA: 2  Anesthesia type: MAC          Induction: Intravenous  Anesthetic plan and risks discussed with: Patient      Took beta blocker at 515 am

## 2017-09-13 NOTE — DISCHARGE INSTRUCTIONS
Malgorzata Blackmon D.O., RUBINACHarish  [facility] [address].  [phone]    Post-Operative Instructions for Cataract Surgery     Remove your eye shield and bandage at 12 noon the same day as surgery and start your eye drops. THROW AWAY THE GAUZE UNDER THE SHIELD.  Place the blue eye shield back on for one week while asleep, even if napping in the recliner. Use the tape included in your bag.  DO NOT RUB YOUR EYE EVER! DO NOT WIPE YOUR EYE! ONLY WIPE ON YOUR CHEEK!                DO NOT WEAR EYE MAKEUP FOR 1 WEEK!  You may take a bath today and you can shower starting tomorrow.  You may resume your previous diet.  If you use glaucoma drops in the operative eye, continue them as directed.  Common symptoms after surgery include a scratchy feeling, slight headache, red eye, and/or blurred vision. You may use Advil or Tylenol for any discomfort.  Avoid strenuous activities and driving until you see Dr. Omar Tavarez tomorrow. Please use care when walking, your depth perception may be altered.  Bring your bag with your drops to your follow up appointment. Below are Instructions On How To Use Your Eye Drops. ON THE DAY OF SURGERY:     Use all three eye drops at 12 noon, 4pm, and 8pm.  Wait 10 minutes between drops. THE DAY AFTER SURGERY:     You will use the drops 4 times a day at 8am, 12 noon, 4pm, and 8pm.   Use the drops every day until bottles are empty. Vigamox (tan top) Put 1 drop in at 8am, 12 noon, 4pm, and 8pm.    Pred Acetate (pink top) Put 1 drop in at 8:10am, 12:10pm, 4:10pm, and 8:10pm. Shake before using. Diclofenac Put 1 drop in at 8:20am, 12:20pm, 4:20pm, 8:20pm.    CONTINUE DROPS UNTIL ALL BOTTLES ARE EMPTY! Follow-up appointment tomorrow at Dr. Warner Talavera office:______495 am______________    Please call the office at [phone] if you experience severe pain or nausea.      The following personal items collected during your admission are returned to you:   Dental Appliance:    Vision:    Hearing Aid: @FLOW  DISCHARGE SUMMARY from Nurse  (1341:LAST)@  Jewelry:    Clothing:    Other Valuables:    Valuables sent to safe:        PATIENT INSTRUCTIONS:    After General Anesthesia or Intravenous Sedation, for 24 hours or while taking prescription Narcotics:        Someone should be with you for the next 24 hours. For your own safety, a responsible adult must drive you home. · Limit your activities  · Recommended activity: Rest today, up with assistance today. Do not climb stairs or shower unattended for the next 24 hours. · Please start with a soft bland diet and advance as tolerated (no nausea) to regular diet. · If you have a sore throat you should try the following: fluids, warm salt water gargles, or throat lozenges. If it does not improve after several days please follow up with your primary physician. · Do not drive and operate hazardous machinery  · Do not make important personal or business decisions  · Do  not drink alcoholic beverages  · If you have not urinated within 8 hours after discharge, please contact your surgeon on call. Report the following to your surgeon:  · Excessive pain, swelling, redness or odor of or around the surgical area  · Temperature over 100.5  · Any nausea or vomiting. · You will receive a Post Operative Call from one of the Recovery Room Nurses on the day after your surgery to check on you. It is very important for us to know how you are recovering after your surgery. If you have an issue or need to speak with someone, please call your surgeon, do not wait for the post operative call. · You may receive an e-mail or letter in the mail from [facility] regarding your experience with us in the Ambulatory Surgery Unit. Your feedback is valuable to us and we appreciate your participation in the survey.        · If the above instructions are not adequate, please contact Elijah Kent RN, [facility] anesthesia Nurse Manager or our Anesthesiologist, at [phone]. If you are having problems after your surgery, call the physician at his office number. · We wish you a speedy recovery ? What to do at Home:      *  Please give a list of your current medications to your Primary Care Provider. *  Please update this list whenever your medications are discontinued, doses are      changed, or new medications (including over-the-counter products) are added. *  Please carry medication information at all times in case of emergency situations. These are general instructions for a healthy lifestyle:    No smoking/ No tobacco products/ Avoid exposure to second hand smoke    Surgeon General's Warning:  Quitting smoking now greatly reduces serious risk to your health. Obesity, smoking, and sedentary lifestyle greatly increases your risk for illness    A healthy diet, regular physical exercise & weight monitoring are important for maintaining a healthy lifestyle    You may be retaining fluid if you have a history of heart failure or if you experience any of the following symptoms:  Weight gain of 3 pounds or more overnight or 5 pounds in a week, increased swelling in our hands or feet or shortness of breath while lying flat in bed. Please call your doctor as soon as you notice any of these symptoms; do not wait until your next office visit. Recognize signs and symptoms of STROKE:    B - Balance  E - Eyes    F-  Face looks uneven    A-  Arms unable to move or move even    S-  Speech slurred or non-existent    T-  Time-call 911 as soon as signs and symptoms begin-DO NOT go       Back to bed or wait to see if you get better-TIME IS BRAIN. If you have not received your influenza and/or pneumococcal vaccine, please follow up with your primary care physician. The discharge information has been reviewed with the patient and family. The patient and family verbalized understanding.

## 2017-09-13 NOTE — IP AVS SNAPSHOT
[address] [facility] 
[phone] Patient: Chris Mace 
MRN: [id number] You are allergic to the following Allergen Reactions Percocet (Oxycodone-Acetaminophen) Other (comments) \"feel weird\" Recent Documentation Height Weight BMI Smoking Status 1.727 m 93 kg 31.17 kg/m2 Never Smoker Emergency Contacts Name Discharge Info Relation Home Work Mobile Donita Conte DISCHARGE CAREGIVER [3] Spouse [3] [phone] About your hospitalization You were admitted on:  September 13, 2017 You last received care in the:  \[facility] [place] [facility]\"" [employer] You were discharged on:  September 13, 2017 Unit phone number:  [phone] Why you were hospitalized Your primary diagnosis was:  Combined Forms Of Age-Related Cataract Of Right Eye  
  
  
 
  
  
Providers Seen During Your Hospitalizations Provider Role Specialty Primary office phone Rosalee Ronquillo DO Attending Provider Ophthalmology [phone] Your Primary Care Physician (PCP) Primary Care Physician Office Phone Office Fax [address] [facility], [address] [facility] [phone] Follow-up Information Follow up With Details Comments Contact Info Eliz Candelaria MD   [address] [facility] [address] [phone] Your Appointments Wednesday September 13, 2017 CATARACT EXTRACTION WITH INTRA OCULAR LENS IMPLANT with DO ARJUN [place]brook AMB SURGERY UNIT (RI OR PRE ASSESSMENT) [address] [facility]  
[phone] Monday September 18, 2017 10:00 AM EDT  
LAB with LAB [facility] [place] [address] [place] [address]) [address] [facility] [address] [phone] Wednesday September 20, 2017 CATARACT EXTRACTION WITH INTRA OCULAR LENS IMPLANT with Rosalee Ronquillo DO  
 [facility] AMB SURGERY UNIT (RI OR PRE ASSESSMENT) [address] [facility]  
[phone] Friday September 29, 2017 10:00 AM EDT ACUTE CARE with Tama Render, MD Jaclyn Shone [address] [place] [address]) [address] [place] [id number] [phone] Current Discharge Medication List  
  
ASK your doctor about these medications Dose & Instructions Dispensing Information Comments Morning Noon Evening Bedtime  
 acetaminophen 500 mg tablet Commonly known as:  TYLENOL Your last dose was: Your next dose is:    
   
   
 Dose:  500 mg Take 500 mg by mouth daily as needed for Pain. Refills:  0  
     
   
   
   
  
 amLODIPine 10 mg tablet Commonly known as:  Humberto Matar Your last dose was: Your next dose is:    
   
   
 Dose:  10 mg Take 1 Tab by mouth daily. Indications: hypertension Quantity:  30 Tab Refills:  11  
     
   
   
   
  
 aspirin 81 mg chewable tablet Your last dose was: Your next dose is:    
   
   
 Dose:  81 mg Take 1 Tab by mouth nightly. Quantity:  90 Tab Refills:  3  
     
   
   
   
  
 atorvastatin 80 mg tablet Commonly known as:  LIPITOR Your last dose was: Your next dose is: TAKE ONE TABLET ONCE DAILY Quantity:  30 Tab Refills:  11  
     
   
   
   
  
 carvedilol 12.5 mg tablet Commonly known as:  Don Cruzer Your last dose was: Your next dose is:    
   
   
 Dose:  12.5 mg Take 1 Tab by mouth two (2) times daily (with meals). Quantity:  60 Tab Refills:  5  
     
   
   
   
  
 cholecalciferol 1,000 unit Cap Commonly known as:  VITAMIN D3 Your last dose was: Your next dose is: Take  by mouth nightly. Refills:  0 DULoxetine 60 mg capsule Commonly known as:  CYMBALTA Your last dose was: Your next dose is: Dose:  60 mg Take 1 Cap by mouth daily. Quantity:  30 Cap Refills:  5  
     
   
   
   
  
 gabapentin 300 mg capsule Commonly known as:  NEURONTIN Your last dose was: Your next dose is:    
   
   
 Dose:  300 mg Take 1 Cap by mouth three (3) times daily. Quantity:  90 Cap Refills:  5  
     
   
   
   
  
 lisinopril-hydroCHLOROthiazide 20-12.5 mg per tablet Commonly known as:  Lazaro Andrade Your last dose was: Your next dose is: TAKE TWO TABLETS ONCE DAILY Quantity:  60 Tab Refills:  11  
     
   
   
   
  
 moxifloxacin 0.5 % ophthalmic solution Commonly known as:  Waqas Deridder Your last dose was: Your next dose is:    
   
   
 instill 1 drop into right eye four times a day until finished - S...  (REFER TO PRESCRIPTION NOTES). Refills:  0  
     
   
   
   
  
 prednisoLONE acetate 1 % ophthalmic suspension Commonly known as:  PRED FORTE Your last dose was: Your next dose is:    
   
   
 instill 1 drop into right eye four times a day until finished - START THIS DROP AFTER SURGERY Refills:  0 Discharge Instructions [facility] 
[facility]. 
[phone] Post-Operative Instructions for Cataract Surgery ? Remove your eye shield and bandage at 12 noon the same day as surgery and start your eye drops. THROW AWAY THE GAUZE UNDER THE SHIELD. ? Place the blue eye shield back on for one week while asleep, even if napping in the recliner. Use the tape included in your bag.   
       
 
? DO NOT RUB YOUR EYE EVER! DO NOT WIPE YOUR EYE! ONLY WIPE ON YOUR CHEEK! 
 
            DO NOT WEAR EYE MAKEUP FOR 1 WEEK! 
 
 
? You may take a bath today and you can shower starting tomorrow. ? You may resume your previous diet. ? If you use glaucoma drops in the operative eye, continue them as directed. ? Common symptoms after surgery include a scratchy feeling, slight headache, red eye, and/or blurred vision. You may use Advil or Tylenol for any discomfort. ? Avoid strenuous activities and driving until you see Dr. Tracee Garcia tomorrow. Please use care when walking, your depth perception may be altered. ? Bring your bag with your drops to your follow up appointment. Below are Instructions On How To Use Your Eye Drops. ON THE DAY OF SURGERY: 
 
? Use all three eye drops at 12 noon, 4pm, and 8pm.  Wait 10 minutes between drops. THE DAY AFTER SURGERY: 
 
? You will use the drops 4 times a day at 8am, 12 noon, 4pm, and 8pm. 
? Use the drops every day until bottles are empty. Vigamox (tan top) Put 1 drop in at 8am, 12 noon, 4pm, and 8pm. 
 
Pred Acetate (pink top) Put 1 drop in at 8:10am, 12:10pm, 4:10pm, and 8:10pm. Shake before using. Diclofenac Put 1 drop in at 8:20am, 12:20pm, 4:20pm, 8:20pm. 
 
CONTINUE DROPS UNTIL ALL BOTTLES ARE EMPTY! Follow-up appointment tomorrow at Dr. Garcia [place] office:____________________ Please call the office at [phone] if you experience severe pain or nausea. The following personal items collected during your admission are returned to you:  
Dental Appliance:   
Vision:   
Hearing Aid: @FLOW DISCHARGE SUMMARY from Nurse 
(1341:LAST)@ Jewelry:   
Clothing:   
Other Valuables:   
Valuables sent to safe:   
 
 
PATIENT INSTRUCTIONS: 
 
After General Anesthesia or Intravenous Sedation, for 24 hours or while taking prescription Narcotics: 
      Someone should be with you for the next 24 hours. For your own safety, a responsible adult must drive you home. · Limit your activities · Recommended activity: Rest today, up with assistance today. Do not climb stairs or shower unattended for the next 24 hours. · Please start with a soft bland diet and advance as tolerated (no nausea) to regular diet. · If you have a sore throat you should try the following: fluids, warm salt water gargles, or throat lozenges. If it does not improve after several days please follow up with your primary physician. · Do not drive and operate hazardous machinery · Do not make important personal or business decisions · Do  not drink alcoholic beverages · If you have not urinated within 8 hours after discharge, please contact your surgeon on call. Report the following to your surgeon: 
· Excessive pain, swelling, redness or odor of or around the surgical area · Temperature over 100.5 · Any nausea or vomiting. · You will receive a Post Operative Call from one of the Recovery Room Nurses on the day after your surgery to check on you. It is very important for us to know how you are recovering after your surgery. If you have an issue or need to speak with someone, please call your surgeon, do not wait for the post operative call. · You may receive an e-mail or letter in the mail from [facility] regarding your experience with us in the Ambulatory Surgery Unit. Your feedback is valuable to us and we appreciate your participation in the survey. · If the above instructions are not adequate, please contact Emanuel Harding RN, [facility] anesthesia Nurse Manager or our Anesthesiologist, at [phone]. If you are having problems after your surgery, call the physician at his office number. · We wish you a speedy recovery ? What to do at Home: *  Please give a list of your current medications to your Primary Care Provider. *  Please update this list whenever your medications are discontinued, doses are 
    changed, or new medications (including over-the-counter products) are added. *  Please carry medication information at all times in case of emergency situations. These are general instructions for a healthy lifestyle: No smoking/ No tobacco products/ Avoid exposure to second hand smoke Surgeon General's Warning:  Quitting smoking now greatly reduces serious risk to your health. Obesity, smoking, and sedentary lifestyle greatly increases your risk for illness A healthy diet, regular physical exercise & weight monitoring are important for maintaining a healthy lifestyle You may be retaining fluid if you have a history of heart failure or if you experience any of the following symptoms:  Weight gain of 3 pounds or more overnight or 5 pounds in a week, increased swelling in our hands or feet or shortness of breath while lying flat in bed. Please call your doctor as soon as you notice any of these symptoms; do not wait until your next office visit. Recognize signs and symptoms of STROKE: 
 
B - Balance E - Eyes F-  Face looks uneven A-  Arms unable to move or move even S-  Speech slurred or non-existent T-  Time-call 911 as soon as signs and symptoms begin-DO NOT go Back to bed or wait to see if you get better-TIME IS BRAIN. If you have not received your influenza and/or pneumococcal vaccine, please follow up with your primary care physician. The discharge information has been reviewed with the patient and family. The patient and family verbalized understanding. Discharge Orders None ACO Transitions of Care Introducing [employer] [address] Ebony Fabiano offers a voluntary care coordination program to provide high quality service and care to [facility] fee-for-service beneficiaries. Marck Murphy was designed to help you enhance your health and well-being through the following services: ? Transitions of Care  support for individuals who are transitioning from one care setting to another (example: Hospital to home). ?  Chronic and Complex Care Coordination  support for individuals and caregivers of those with serious or chronic illnesses or with more than one chronic (ongoing) condition and those who take a number of different medications. If you meet specific medical criteria, a [facility] may call you directly to coordinate your care with your primary care physician and your other care providers. For questions about the [facility] programs, please, contact your physicians office. For general questions or additional information about Accountable Care Organizations: 
Please visit www.medicare.gov/acos. html or call 1-800-MEDICARE (0-764.620.5126) TTY users should call 3-151.296.2496. Introducing [facility]! Jose Faria introduces [employer] patient portal. Now you can access parts of your medical record, email your doctor's office, and request medication refills online. 1. In your internet browser, go to [email or web address] 2. Click on the First Time User? Click Here link in the Sign In box. You will see the New Member Sign Up page. 3. Enter your [employer] Access Code exactly as it appears below. You will not need to use this code after youve completed the sign-up process. If you do not sign up before the expiration date, you must request a new code. · [employer] Access Code: [email or web address] Expires: 10/29/2017 10:38 AM 
 
4. Enter the last four digits of your Social Security Number (xxxx) and Date of Birth (mm/dd/yyyy) as indicated and click Submit. You will be taken to the next sign-up page. 5. Create a [employer] ID. This will be your [employer] login ID and cannot be changed, so think of one that is secure and easy to remember. 6. Create a [employer] password. You can change your password at any time. 7. Enter your Password Reset Question and Answer. This can be used at a later time if you forget your password. 8. Enter your e-mail address. You will receive e-mail notification when new information is available in [address] 9. Click Sign Up. You can now view and download portions of your medical record. 10. Click the Download Summary menu link to download a portable copy of your medical information. If you have questions, please visit the Frequently Asked Questions section of the [employer] website. Remember, [employer] is NOT to be used for urgent needs. For medical emergencies, dial 911. Now available from your iPhone and Android! General Information Please provide this summary of care documentation to your next provider. Patient Signature:  ____________________________________________________________ Date:  ____________________________________________________________  
  
[facility] Provider Signature:  ____________________________________________________________ Date:  ____________________________________________________________

## 2017-09-13 NOTE — ANESTHESIA POSTPROCEDURE EVALUATION
Post-Anesthesia Evaluation and Assessment    Patient: Elizabeth Mart MRN: [id number]  SSN: [id number]    YOB: 1940  Age: 65 y.o. Sex: male       Cardiovascular Function/Vital Signs  Visit Vitals    /77    Pulse 73    Temp 36.5 °C (97.7 °F)    Resp 21    Ht 5' 8\" (1.727 m)    Wt 90.3 kg (199 lb)    SpO2 95%    BMI 30.26 kg/m2       Patient is status post MAC anesthesia for Procedure(s):  CATARACT EXTRACTION WITH INTRA OCULAR LENS IMPLANT RIGHT EYE. Nausea/Vomiting: None    Postoperative hydration reviewed and adequate. Pain:  Pain Scale 1: Numeric (0 - 10) (09/13/17 0938)  Pain Intensity 1: 0 (09/13/17 0938)   Managed    Neurological Status:   Neuro (WDL): Exceptions to WDL (09/13/17 0933)  Neuro  Neurologic State: Alert (09/13/17 0933)  LUE Motor Response: Purposeful (09/13/17 0933)  LLE Motor Response: Purposeful (09/13/17 0933)  RUE Motor Response: Purposeful (09/13/17 0933)  RLE Motor Response: Purposeful (09/13/17 0933)   At baseline    Mental Status and Level of Consciousness: Arousable    Pulmonary Status:   O2 Device: Room air (09/13/17 0933)   Adequate oxygenation and airway patent    Complications related to anesthesia: None    Post-anesthesia assessment completed.  No concerns    Signed By: Dulce Maria Daniel MD     September 13, 2017

## 2017-09-13 NOTE — PERIOP NOTES
Ed Conte   1940  [id number]    Situation:  Verbal report given from: Ilana Martinez RN and Jocy Perez CRNA  Procedure: Procedure(s):  CATARACT EXTRACTION WITH INTRA OCULAR LENS IMPLANT RIGHT EYE    Background:    Preoperative diagnosis: CATARACT RIGHT EYE    Postoperative diagnosis: CATARACT RIGHT EYE    :  Dr. Michelle Brunner    Assistant(s): Circ-1: Tutu Tay RN  Scrub Tech-1: Rhonda Ferguson    Specimens: * No specimens in log *    Assessment:  Intra-procedure medications         Anesthesia gave intra-procedure sedation and medications, see anesthesia flow sheet     Intravenous fluids: LR@ KVO     Vital signs stable       Recommendation:    Permission to share finding with family or friend yes    [id number] Discharged to home via/wc,accompanied to car per RN. Skin warm and dry, awake and alert. Respirations even, unlabored. Pt and family members questions and concerns addressed prior to discharge. All belongings with pt.

## 2017-09-18 ENCOUNTER — APPOINTMENT (OUTPATIENT)
Dept: INTERNAL MEDICINE CLINIC | Age: 77
End: 2017-09-18

## 2017-09-18 ENCOUNTER — HOSPITAL ENCOUNTER (OUTPATIENT)
Dept: LAB | Age: 77
Discharge: HOME | End: 2017-09-18
Payer: MEDICARE

## 2017-09-18 DIAGNOSIS — N18.30 BENIGN HYPERTENSION WITH CHRONIC KIDNEY DISEASE, STAGE III (HCC): ICD-10-CM

## 2017-09-18 DIAGNOSIS — R73.02 GLUCOSE INTOLERANCE (IMPAIRED GLUCOSE TOLERANCE): ICD-10-CM

## 2017-09-18 DIAGNOSIS — I12.9 BENIGN HYPERTENSION WITH CHRONIC KIDNEY DISEASE, STAGE III (HCC): ICD-10-CM

## 2017-09-18 PROCEDURE — 83036 HEMOGLOBIN GLYCOSYLATED A1C: CPT

## 2017-09-18 PROCEDURE — 80048 BASIC METABOLIC PNL TOTAL CA: CPT

## 2017-09-18 PROCEDURE — 36415 COLL VENOUS BLD VENIPUNCTURE: CPT

## 2017-09-19 ENCOUNTER — ANESTHESIA EVENT (OUTPATIENT)
Dept: SURGERY | Age: 77
End: 2017-09-19
Payer: MEDICARE

## 2017-09-19 PROBLEM — H25.812 COMBINED FORMS OF AGE-RELATED CATARACT OF LEFT EYE: Status: ACTIVE | Noted: 2017-09-19

## 2017-09-19 LAB
BUN SERPL-MCNC: 14 MG/DL (ref 8–27)
BUN/CREAT SERPL: 13 (ref 10–24)
CALCIUM SERPL-MCNC: 9.2 MG/DL (ref 8.6–10.2)
CHLORIDE SERPL-SCNC: 104 MMOL/L (ref 96–106)
CO2 SERPL-SCNC: 27 MMOL/L (ref 18–29)
CREAT SERPL-MCNC: 1.04 MG/DL (ref 0.76–1.27)
EST. AVERAGE GLUCOSE BLD GHB EST-MCNC: 114 MG/DL
GLUCOSE SERPL-MCNC: 99 MG/DL (ref 65–99)
HBA1C MFR BLD: 5.6 % (ref 4.8–5.6)
INTERPRETATION: NORMAL
POTASSIUM SERPL-SCNC: 4.1 MMOL/L (ref 3.5–5.2)
SODIUM SERPL-SCNC: 144 MMOL/L (ref 134–144)

## 2017-09-19 RX ORDER — MOXIFLOXACIN 5 MG/ML
1 SOLUTION/ DROPS OPHTHALMIC 4 TIMES DAILY
COMMUNITY
End: 2018-03-29

## 2017-09-19 NOTE — PERIOP NOTES
[facility]  Ambulatory Surgery Unit  Pre-operative Instructions    Surgery/Procedure Date  9/20/17            Tentative Arrival Time 0730      1. On the day of your surgery/procedure, please report to the Ambulatory Surgery Unit Registration Desk and sign in at your designated time. The Ambulatory Surgery Unit is located in [facility] on the [facility] side of the [facility] across from the [address] [place] [address]. Please have all of your health insurance cards and a photo ID. 2. You must have someone with you to drive you home, as you should not drive a car for 24 hours following anesthesia. Please make arrangements for a responsible adult friend or family member to stay with you for at least the first 24 hours after your surgery. 3. Do not have anything to eat or drink (including water, gum, mints, coffee, juice) after midnight   9/19/17. This may not apply to medications prescribed by your physician. (Please note below the special instructions with medications to take the morning of surgery, if applicable.)    4. We recommend you do not drink any alcoholic beverages for 24 hours before and after your surgery. 5. Contact your surgeons office for instructions on the following medications: non-steroidal anti-inflammatory drugs (i.e. Advil, Aleve), vitamins, and supplements. (Some surgeons will want you to stop these medications prior to surgery and others may allow you to take them)   **If you are currently taking Plavix, Coumadin, Aspirin and/or other blood-thinning agents, contact your surgeon for instructions. ** Your surgeon will partner with the physician prescribing these medications to determine if it is safe to stop or if you need to continue taking. Please do not stop taking these medications without instructions from your surgeon.     6. In an effort to help prevent surgical site infection, we ask that you shower with an anti-bacterial soap (i.e. Dial or Safeguard) on the morning of surgery. Do not apply any lotions, powders, or deodorants after the shower on the day of your procedure. If applicable, please do not shave the operative site for 48 hours prior to surgery. 7. Wear comfortable clothes. Wear glasses instead of contacts. Do not bring any jewelry or money (other than copays or fees as instructed). Do not wear make-up, particularly mascara, the morning of your surgery. Do not wear nail polish, particularly if you are having foot /hand surgery. Wear your hair loose or down, no ponytails, buns, rebeka pins or clips. All body piercings must be removed. 8. You should understand that if you do not follow these instructions your surgery may be cancelled. If your physical condition changes (i.e. fever, cold or flu) please contact your surgeon as soon as possible. 9. It is important that you be on time. If a situation occurs where you may be late, or if you have any questions or problems, please call [phone].    10. Your surgery time may be subject to change. You will receive a phone call the day prior to surgery to confirm your arrival time. 11. Pediatric patients: please bring a change of clothes, diapers, bottle/sippy cup, pacifier, etc.      Special Instructions: Take all medications and inhalers, as prescribed, on the morning of surgery with a sip of water EXCEPT: none      I understand a pre-operative phone call will be made to verify my surgery time. In the event that I am not available, I give permission for a message to be left on my answering service and/or with another person?       Yes     (instructions given verbally during phone assessment- pt voiced understanding)     ___________________      ___________________      ________________  (Signature of Patient)          (Witness)                   (Date and Time)

## 2017-09-20 ENCOUNTER — ANESTHESIA (OUTPATIENT)
Dept: SURGERY | Age: 77
End: 2017-09-20
Payer: MEDICARE

## 2017-09-20 ENCOUNTER — HOSPITAL ENCOUNTER (OUTPATIENT)
Age: 77
Setting detail: OUTPATIENT SURGERY
Discharge: HOME | End: 2017-09-20
Attending: OPHTHALMOLOGY | Admitting: OPHTHALMOLOGY
Payer: MEDICARE

## 2017-09-20 VITALS
TEMPERATURE: 98.2 F | RESPIRATION RATE: 18 BRPM | DIASTOLIC BLOOD PRESSURE: 93 MMHG | HEART RATE: 74 BPM | BODY MASS INDEX: 29.91 KG/M2 | SYSTOLIC BLOOD PRESSURE: 142 MMHG | OXYGEN SATURATION: 95 % | WEIGHT: 197.38 LBS | HEIGHT: 68 IN

## 2017-09-20 PROCEDURE — 76210000046 HC AMBSU PH II REC FIRST 0.5 HR: Performed by: OPHTHALMOLOGY

## 2017-09-20 PROCEDURE — 76030000002 HC AMB SURG OR TIME FIRST 0.: Performed by: OPHTHALMOLOGY

## 2017-09-20 PROCEDURE — 74011000250 HC RX REV CODE- 250: Performed by: OPHTHALMOLOGY

## 2017-09-20 PROCEDURE — 74011250636 HC RX REV CODE- 250/636

## 2017-09-20 PROCEDURE — V2632 POST CHMBR INTRAOCULAR LENS: HCPCS | Performed by: OPHTHALMOLOGY

## 2017-09-20 PROCEDURE — 76060000073 HC AMB SURG ANES FIRST 0.5 HR: Performed by: OPHTHALMOLOGY

## 2017-09-20 PROCEDURE — 74011250636 HC RX REV CODE- 250/636: Performed by: OPHTHALMOLOGY

## 2017-09-20 PROCEDURE — 77030013854 HC PK PHACO KT ALCN -C: Performed by: OPHTHALMOLOGY

## 2017-09-20 PROCEDURE — 77030018846 HC SOL IRR STRL H20 ICUM -A: Performed by: OPHTHALMOLOGY

## 2017-09-20 DEVICE — LENS IOL POST 1-PC 6X13 22.0 -- ACRYSOF: Type: IMPLANTABLE DEVICE | Site: EYE | Status: FUNCTIONAL

## 2017-09-20 RX ORDER — CYCLOPENTOLATE HYDROCHLORIDE 20 MG/ML
1 SOLUTION/ DROPS OPHTHALMIC
Status: COMPLETED | OUTPATIENT
Start: 2017-09-20 | End: 2017-09-20

## 2017-09-20 RX ORDER — SODIUM CHLORIDE 0.9 % (FLUSH) 0.9 %
5-10 SYRINGE (ML) INJECTION EVERY 8 HOURS
Status: DISCONTINUED | OUTPATIENT
Start: 2017-09-20 | End: 2017-09-20 | Stop reason: HOSPADM

## 2017-09-20 RX ORDER — ONDANSETRON 2 MG/ML
4 INJECTION INTRAMUSCULAR; INTRAVENOUS AS NEEDED
Status: DISCONTINUED | OUTPATIENT
Start: 2017-09-20 | End: 2017-09-20 | Stop reason: HOSPADM

## 2017-09-20 RX ORDER — PHENYLEPHRINE HYDROCHLORIDE 25 MG/ML
1 SOLUTION/ DROPS OPHTHALMIC
Status: COMPLETED | OUTPATIENT
Start: 2017-09-20 | End: 2017-09-20

## 2017-09-20 RX ORDER — MIDAZOLAM HYDROCHLORIDE 1 MG/ML
INJECTION, SOLUTION INTRAMUSCULAR; INTRAVENOUS AS NEEDED
Status: DISCONTINUED | OUTPATIENT
Start: 2017-09-20 | End: 2017-09-20 | Stop reason: HOSPADM

## 2017-09-20 RX ORDER — DIPHENHYDRAMINE HYDROCHLORIDE 50 MG/ML
12.5 INJECTION, SOLUTION INTRAMUSCULAR; INTRAVENOUS AS NEEDED
Status: DISCONTINUED | OUTPATIENT
Start: 2017-09-20 | End: 2017-09-20 | Stop reason: HOSPADM

## 2017-09-20 RX ORDER — SODIUM CHLORIDE, SODIUM LACTATE, POTASSIUM CHLORIDE, CALCIUM CHLORIDE 600; 310; 30; 20 MG/100ML; MG/100ML; MG/100ML; MG/100ML
25 INJECTION, SOLUTION INTRAVENOUS CONTINUOUS
Status: DISCONTINUED | OUTPATIENT
Start: 2017-09-20 | End: 2017-09-20 | Stop reason: HOSPADM

## 2017-09-20 RX ORDER — SODIUM CHLORIDE 0.9 % (FLUSH) 0.9 %
5-10 SYRINGE (ML) INJECTION AS NEEDED
Status: DISCONTINUED | OUTPATIENT
Start: 2017-09-20 | End: 2017-09-20 | Stop reason: HOSPADM

## 2017-09-20 RX ORDER — LIDOCAINE HYDROCHLORIDE 10 MG/ML
0.5 INJECTION, SOLUTION EPIDURAL; INFILTRATION; INTRACAUDAL; PERINEURAL ONCE
Status: COMPLETED | OUTPATIENT
Start: 2017-09-20 | End: 2017-09-20

## 2017-09-20 RX ORDER — GENTAMICIN SULFATE 0.3 %
0.5 OINTMENT (GRAM) OPHTHALMIC (EYE) 3 TIMES DAILY
Status: DISCONTINUED | OUTPATIENT
Start: 2017-09-20 | End: 2017-09-20 | Stop reason: HOSPADM

## 2017-09-20 RX ORDER — SODIUM CHLORIDE 9 MG/ML
25 INJECTION, SOLUTION INTRAVENOUS CONTINUOUS
Status: DISCONTINUED | OUTPATIENT
Start: 2017-09-20 | End: 2017-09-20 | Stop reason: HOSPADM

## 2017-09-20 RX ORDER — LIDOCAINE HYDROCHLORIDE 40 MG/ML
3 INJECTION, SOLUTION RETROBULBAR; TOPICAL ONCE
Status: COMPLETED | OUTPATIENT
Start: 2017-09-20 | End: 2017-09-20

## 2017-09-20 RX ORDER — PREDNISOLONE ACETATE 10 MG/ML
1 SUSPENSION/ DROPS OPHTHALMIC 2 TIMES DAILY
Status: DISCONTINUED | OUTPATIENT
Start: 2017-09-20 | End: 2017-09-20 | Stop reason: HOSPADM

## 2017-09-20 RX ORDER — PROPARACAINE HYDROCHLORIDE 5 MG/ML
1 SOLUTION/ DROPS OPHTHALMIC
Status: COMPLETED | OUTPATIENT
Start: 2017-09-20 | End: 2017-09-20

## 2017-09-20 RX ORDER — LIDOCAINE HYDROCHLORIDE 10 MG/ML
0.1 INJECTION, SOLUTION EPIDURAL; INFILTRATION; INTRACAUDAL; PERINEURAL AS NEEDED
Status: DISCONTINUED | OUTPATIENT
Start: 2017-09-20 | End: 2017-09-20 | Stop reason: HOSPADM

## 2017-09-20 RX ORDER — FENTANYL CITRATE 50 UG/ML
25 INJECTION, SOLUTION INTRAMUSCULAR; INTRAVENOUS
Status: DISCONTINUED | OUTPATIENT
Start: 2017-09-20 | End: 2017-09-20 | Stop reason: HOSPADM

## 2017-09-20 RX ORDER — BACITRACIN 500 [USP'U]/G
OINTMENT OPHTHALMIC 2 TIMES DAILY
Status: DISCONTINUED | OUTPATIENT
Start: 2017-09-20 | End: 2017-09-20 | Stop reason: HOSPADM

## 2017-09-20 RX ORDER — KETOROLAC TROMETHAMINE 5 MG/ML
1 SOLUTION OPHTHALMIC
Status: COMPLETED | OUTPATIENT
Start: 2017-09-20 | End: 2017-09-20

## 2017-09-20 RX ADMIN — KETOROLAC TROMETHAMINE 1 DROP: 5 SOLUTION OPHTHALMIC at 08:12

## 2017-09-20 RX ADMIN — PROPARACAINE HYDROCHLORIDE 1 DROP: 5 SOLUTION/ DROPS OPHTHALMIC at 08:43

## 2017-09-20 RX ADMIN — PROPARACAINE HYDROCHLORIDE 1 DROP: 5 SOLUTION/ DROPS OPHTHALMIC at 08:11

## 2017-09-20 RX ADMIN — CYCLOPENTOLATE HYDROCHLORIDE 1 DROP: 20 SOLUTION/ DROPS OPHTHALMIC at 08:20

## 2017-09-20 RX ADMIN — PHENYLEPHRINE HYDROCHLORIDE 1 DROP: 25 SOLUTION/ DROPS OPHTHALMIC at 08:26

## 2017-09-20 RX ADMIN — KETOROLAC TROMETHAMINE 1 DROP: 5 SOLUTION OPHTHALMIC at 08:21

## 2017-09-20 RX ADMIN — PROPARACAINE HYDROCHLORIDE 1 DROP: 5 SOLUTION/ DROPS OPHTHALMIC at 08:05

## 2017-09-20 RX ADMIN — CYCLOPENTOLATE HYDROCHLORIDE 1 DROP: 20 SOLUTION/ DROPS OPHTHALMIC at 08:26

## 2017-09-20 RX ADMIN — PHENYLEPHRINE HYDROCHLORIDE 1 DROP: 25 SOLUTION/ DROPS OPHTHALMIC at 08:05

## 2017-09-20 RX ADMIN — PROPARACAINE HYDROCHLORIDE 1 DROP: 5 SOLUTION/ DROPS OPHTHALMIC at 08:26

## 2017-09-20 RX ADMIN — CYCLOPENTOLATE HYDROCHLORIDE 1 DROP: 20 SOLUTION/ DROPS OPHTHALMIC at 08:05

## 2017-09-20 RX ADMIN — CYCLOPENTOLATE HYDROCHLORIDE 1 DROP: 20 SOLUTION/ DROPS OPHTHALMIC at 08:11

## 2017-09-20 RX ADMIN — KETOROLAC TROMETHAMINE 1 DROP: 5 SOLUTION OPHTHALMIC at 08:05

## 2017-09-20 RX ADMIN — KETOROLAC TROMETHAMINE 1 DROP: 5 SOLUTION OPHTHALMIC at 08:26

## 2017-09-20 RX ADMIN — PHENYLEPHRINE HYDROCHLORIDE 1 DROP: 25 SOLUTION/ DROPS OPHTHALMIC at 08:12

## 2017-09-20 RX ADMIN — PROPARACAINE HYDROCHLORIDE 1 DROP: 5 SOLUTION/ DROPS OPHTHALMIC at 08:20

## 2017-09-20 RX ADMIN — PHENYLEPHRINE HYDROCHLORIDE 1 DROP: 25 SOLUTION/ DROPS OPHTHALMIC at 08:21

## 2017-09-20 RX ADMIN — SODIUM CHLORIDE 25 ML/HR: 900 INJECTION, SOLUTION INTRAVENOUS at 08:20

## 2017-09-20 RX ADMIN — MIDAZOLAM HYDROCHLORIDE 1 MG: 1 INJECTION, SOLUTION INTRAMUSCULAR; INTRAVENOUS at 09:12

## 2017-09-20 NOTE — IP AVS SNAPSHOT
[address] [facility] 
[phone] Patient: Jorge Luis Shanks 
MRN: [id number] KXO:1/2/7809 You are allergic to the following Allergen Reactions Percocet (Oxycodone-Acetaminophen) Other (comments) \"feel weird\" Recent Documentation Height Weight BMI Smoking Status 1.727 m 90.3 kg 30.26 kg/m2 Never Smoker Emergency Contacts Name Discharge Info Relation Home Work Mobile Donita Conte DISCHARGE CAREGIVER [3] Spouse [3] [phone] About your hospitalization You were admitted on:  September 20, 2017 You last received care in the:  [facility] You were discharged on:  September 20, 2017 Unit phone number:  [phone] Why you were hospitalized Your primary diagnosis was:  Not on File Your diagnoses also included:  Combined Forms Of Age-Related Cataract Of Left Eye  
  
  
 
  
  
Providers Seen During Your Hospitalizations Provider Role Specialty Primary office phone Diaz Martinez DO Attending Provider Ophthalmology [phone] Your Primary Care Physician (PCP) Primary Care Physician Office Phone Office Fax [address] [facility], [address] [facility] [phone] Follow-up Information Follow up With Details Comments Contact Info Eleni Galarza MD   [address] [place] [address] [address] [facility] [address] [phone] Your Appointments Wednesday September 20, 2017 CATARACT EXTRACTION WITH INTRA OCULAR LENS IMPLANT with Diaz Martinez DO  
[facility] AMB SURGERY UNIT (RI OR PRE ASSESSMENT) [address] [facility]  
[phone] Friday September 29, 2017 10:00 AM EDT ACUTE CARE with MD Tacho Lindsay [address] [place] [address]) [address] [facility] [address] [phone] Current Discharge Medication List  
  
 ASK your doctor about these medications Dose & Instructions Dispensing Information Comments Morning Noon Evening Bedtime  
 acetaminophen 500 mg tablet Commonly known as:  TYLENOL Your last dose was: Your next dose is:    
   
   
 Dose:  500 mg Take 500 mg by mouth daily as needed for Pain. Refills:  0  
     
   
   
   
  
 amLODIPine 10 mg tablet Commonly known as:  Margaret Alcocer Your last dose was: Your next dose is:    
   
   
 Dose:  10 mg Take 1 Tab by mouth daily. Indications: hypertension Quantity:  30 Tab Refills:  11  
     
   
   
   
  
 aspirin 81 mg chewable tablet Your last dose was: Your next dose is:    
   
   
 Dose:  81 mg Take 1 Tab by mouth nightly. Quantity:  90 Tab Refills:  3  
     
   
   
   
  
 atorvastatin 80 mg tablet Commonly known as:  LIPITOR Your last dose was: Your next dose is: TAKE ONE TABLET ONCE DAILY Quantity:  30 Tab Refills:  11  
     
   
   
   
  
 carvedilol 12.5 mg tablet Commonly known as:  Francenia Elders Your last dose was: Your next dose is:    
   
   
 Dose:  12.5 mg Take 1 Tab by mouth two (2) times daily (with meals). Quantity:  60 Tab Refills:  5  
     
   
   
   
  
 cholecalciferol 1,000 unit Cap Commonly known as:  VITAMIN D3 Your last dose was: Your next dose is: Take  by mouth nightly. Refills:  0 DULoxetine 60 mg capsule Commonly known as:  CYMBALTA Your last dose was: Your next dose is:    
   
   
 Dose:  60 mg Take 1 Cap by mouth daily. Quantity:  30 Cap Refills:  5  
     
   
   
   
  
 gabapentin 300 mg capsule Commonly known as:  NEURONTIN Your last dose was: Your next dose is:    
   
   
 Dose:  300 mg Take 1 Cap by mouth three (3) times daily. Quantity:  90 Cap Refills:  5 lisinopril-hydroCHLOROthiazide 20-12.5 mg per tablet Commonly known as:  Aura Job Your last dose was: Your next dose is: TAKE TWO TABLETS ONCE DAILY Quantity:  60 Tab Refills:  11  
     
   
   
   
  
 * VIGAMOX 0.5 % ophthalmic solution Generic drug:  moxifloxacin Your last dose was: Your next dose is:    
   
   
 Dose:  1 Drop Administer 1 Drop to left eye four (4) times daily. Refills:  0  
     
   
   
   
  
 * moxifloxacin 0.5 % ophthalmic solution Commonly known as:  Donaldo Lather Your last dose was: Your next dose is:    
   
   
 instill 1 drop into right eye four times a day until finished - S...  (REFER TO PRESCRIPTION NOTES). Refills:  0  
     
   
   
   
  
 * Notice: This list has 2 medication(s) that are the same as other medications prescribed for you. Read the directions carefully, and ask your doctor or other care provider to review them with you. Discharge Instructions [facility] 
[facility]. 
[phone] Post-Operative Instructions for Cataract Surgery ? Remove your eye shield and bandage at 12 noon the same day as surgery and start your eye drops. THROW AWAY THE GAUZE UNDER THE SHIELD. ? Place the blue eye shield back on for one week while asleep, even if napping in the recliner. Use the tape included in your bag.   
       
 
? DO NOT RUB YOUR EYE EVER! DO NOT WIPE YOUR EYE! ONLY WIPE ON YOUR CHEEK! 
 
            DO NOT WEAR EYE MAKEUP FOR 1 WEEK! 
 
 
? You may take a bath today and you can shower starting tomorrow. ? You may resume your previous diet. ? If you use glaucoma drops in the operative eye, continue them as directed. ? Common symptoms after surgery include a scratchy feeling, slight headache, red eye, and/or blurred vision. You may use Advil or Tylenol for any discomfort. ? Avoid strenuous activities and driving until you see Dr. Nohelia Arechiga tomorrow. Please use care when walking, your depth perception may be altered. ? Bring your bag with your drops to your follow up appointment. Below are Instructions On How To Use Your Eye Drops. ON THE DAY OF SURGERY: 
 
? Use all three eye drops at 12 noon, 4pm, and 8pm.  Wait 10 minutes between drops. THE DAY AFTER SURGERY: 
 
? You will use the drops 4 times a day at 8am, 12 noon, 4pm, and 8pm. 
? Use the drops every day until bottles are empty. Vigamox (tan top) Put 1 drop in at 8am, 12 noon, 4pm, and 8pm. 
 
Pred Acetate (pink top) Put 1 drop in at 8:10am, 12:10pm, 4:10pm, and 8:10pm. Shake before using. Diclofenac Put 1 drop in at 8:20am, 12:20pm, 4:20pm, 8:20pm. 
 
CONTINUE DROPS UNTIL ALL BOTTLES ARE EMPTY! Follow-up appointment tomorrow at Dr. Mirna Roblero office:________8:45____________ Please call the office at [phone] if you experience severe pain or nausea. The following personal items collected during your admission are returned to you:  
Dental Appliance:   
Vision:   
Hearing Aid: @FLOW DISCHARGE SUMMARY from Nurse 
(1341:LAST)@ Jewelry:   
Clothing:   
Other Valuables:   
Valuables sent to safe:   
 
 
PATIENT INSTRUCTIONS: 
 
After General Anesthesia or Intravenous Sedation, for 24 hours or while taking prescription Narcotics: 
      Someone should be with you for the next 24 hours. For your own safety, a responsible adult must drive you home. · Limit your activities · Recommended activity: Rest today, up with assistance today. Do not climb stairs or shower unattended for the next 24 hours. · Please start with a soft bland diet and advance as tolerated (no nausea) to regular diet. · If you have a sore throat you should try the following: fluids, warm salt water gargles, or throat lozenges. If it does not improve after several days please follow up with your primary physician. · Do not drive and operate hazardous machinery · Do not make important personal or business decisions · Do  not drink alcoholic beverages · If you have not urinated within 8 hours after discharge, please contact your surgeon on call. Report the following to your surgeon: 
· Excessive pain, swelling, redness or odor of or around the surgical area · Temperature over 100.5 · Any nausea or vomiting. · You will receive a Post Operative Call from one of the Recovery Room Nurses on the day after your surgery to check on you. It is very important for us to know how you are recovering after your surgery. If you have an issue or need to speak with someone, please call your surgeon, do not wait for the post operative call. · You may receive an e-mail or letter in the mail from [facility] regarding your experience with us in the Ambulatory Surgery Unit. Your feedback is valuable to us and we appreciate your participation in the survey. · If the above instructions are not adequate, please contact Norris Michele RN, [facility] anesthesia Nurse Manager or our Anesthesiologist, at [phone]. If you are having problems after your surgery, call the physician at his office number. · We wish you a speedy recovery ? What to do at Home: *  Please give a list of your current medications to your Primary Care Provider. *  Please update this list whenever your medications are discontinued, doses are 
    changed, or new medications (including over-the-counter products) are added. *  Please carry medication information at all times in case of emergency situations. These are general instructions for a healthy lifestyle: No smoking/ No tobacco products/ Avoid exposure to second hand smoke Surgeon General's Warning:  Quitting smoking now greatly reduces serious risk to your health. Obesity, smoking, and sedentary lifestyle greatly increases your risk for illness A healthy diet, regular physical exercise & weight monitoring are important for maintaining a healthy lifestyle You may be retaining fluid if you have a history of heart failure or if you experience any of the following symptoms:  Weight gain of 3 pounds or more overnight or 5 pounds in a week, increased swelling in our hands or feet or shortness of breath while lying flat in bed. Please call your doctor as soon as you notice any of these symptoms; do not wait until your next office visit. Recognize signs and symptoms of STROKE: 
 
B - Balance E - Eyes F-  Face looks uneven A-  Arms unable to move or move even S-  Speech slurred or non-existent T-  Time-call 911 as soon as signs and symptoms begin-DO NOT go Back to bed or wait to see if you get better-TIME IS BRAIN. If you have not received your influenza and/or pneumococcal vaccine, please follow up with your primary care physician. The discharge information has been reviewed with the patient and family. The patient and family verbalized understanding. Discharge Orders None ACO Transitions of Care Introducing [employer] [address] offers a voluntary care coordination program to provide high quality service and care to [facility] fee-for-service beneficiaries. [facility] was designed to help you enhance your health and well-being through the following services: ? Transitions of Care  support for individuals who are transitioning from one care setting to another (example: Hospital to home). ? Chronic and Complex Care Coordination  support for individuals and caregivers of those with serious or chronic illnesses or with more than one chronic (ongoing) condition and those who take a number of different medications.   
 
 
If you meet specific medical criteria, a [facility] Coordinator may call you directly to coordinate your care with your primary care physician and your other care providers. For questions about the [facility] programs, please, contact your physicians office. For general questions or additional information about Accountable Care Organizations: 
Please visit www.medicare.gov/acos. html or call 1-800-MEDICARE (3-383.394.9097) TTY users should call 0-336.629.3027. Introducing [facility]! Luz Mariaon Elias introduces [employer] patient portal. Now you can access parts of your medical record, email your doctor's office, and request medication refills online. 1. In your internet browser, go to [email or web address] 2. Click on the First Time User? Click Here link in the Sign In box. You will see the New Member Sign Up page. 3. Enter your [employer] Access Code exactly as it appears below. You will not need to use this code after youve completed the sign-up process. If you do not sign up before the expiration date, you must request a new code. · [employer] Access Code: [email or web address] Expires: 10/29/2017 10:38 AM 
 
4. Enter the last four digits of your Social Security Number (xxxx) and Date of Birth (mm/dd/yyyy) as indicated and click Submit. You will be taken to the next sign-up page. 5. Create a [employer] ID. This will be your [employer] login ID and cannot be changed, so think of one that is secure and easy to remember. 6. Create a [employer] password. You can change your password at any time. 7. Enter your Password Reset Question and Answer. This can be used at a later time if you forget your password. 8. Enter your e-mail address. You will receive e-mail notification when new information is available in [address] 9. Click Sign Up. You can now view and download portions of your medical record. 10. Click the Download Summary menu link to download a portable copy of your medical information. If you have questions, please visit the Frequently Asked Questions section of the MyChart website. Remember, MyChart is NOT to be used for urgent needs. For medical emergencies, dial 911. Now available from your iPhone and Android! General Information Please provide this summary of care documentation to your next provider. Patient Signature:  ____________________________________________________________ Date:  ____________________________________________________________  
  
[employer] Provider Signature:  ____________________________________________________________ Date:  ____________________________________________________________

## 2017-09-20 NOTE — ANESTHESIA PREPROCEDURE EVALUATION
Anesthetic History     PONV (x1)          Review of Systems / Medical History  Patient summary reviewed, nursing notes reviewed and pertinent labs reviewed    Pulmonary  Within defined limits                 Neuro/Psych   Within defined limits           Cardiovascular    Hypertension        Dysrhythmias : atrial fibrillation  Past MI (2013), CAD, CABG (2013) and hyperlipidemia    Exercise tolerance: >4 METS     GI/Hepatic/Renal         Renal disease (CKD stage III): CRI       Endo/Other        Arthritis     Other Findings   Comments: H/o back fusion  glaucoma           Physical Exam    Airway  Mallampati: III  TM Distance: 4 - 6 cm  Neck ROM: normal range of motion   Mouth opening: Normal     Cardiovascular    Rhythm: regular  Rate: normal      Pertinent negatives: No murmur   Dental  No notable dental hx       Pulmonary  Breath sounds clear to auscultation               Abdominal  GI exam deferred       Other Findings            Anesthetic Plan    ASA: 2  Anesthesia type: MAC          Induction: Intravenous  Anesthetic plan and risks discussed with: Patient      Took beta blocker at 530 am

## 2017-09-20 NOTE — PERIOP NOTES
Marlene Burns Fortune Sr  1940  [id number]    Situation:  Verbal report given from: Matt Richards RN and JANET VARMA  Procedure: Procedure(s):  CATARACT EXTRACTION WITH INTRA OCULAR LENS IMPLANT LEFT EYE    Background:    Preoperative diagnosis: Combined form of age-related cataract, left eye [H25.812]    Postoperative diagnosis: Combined form of age-related cataract, left eye [H25.812]    :  Dr. Rbuy Martell    Assistant(s): Circ-1: Kaley Maier RN  Scrub Tech-1: Laura Green. Chet    Specimens: * No specimens in log *    Assessment:  Intra-procedure medications         Anesthesia gave intra-procedure sedation and medications, see anesthesia flow sheet     Intravenous fluids: NS@ KVO     Vital signs stable       Recommendation:    Permission to share finding with family or friend yes    5030 Discharged to home via/wc,accompanied to car per RN. Skin warm and dry, awake and alert. Respirations even, unlabored. Pt and family members questions and concerns addressed prior to discharge. All belongings with pt.

## 2017-09-20 NOTE — OP NOTES
Name: Tiesha Dinh MASC-4        updated 3/1/2013  Description:  Kaylee Downs with intraocular lens implant    PREOPERATIVE DIAGNOSIS: Visually impairing combined cataract, Left eye. POSTOPERATIVE DIAGNOSIS: Visually impairing combined   cataract,Left eye. OPERATION: Procedure(s):  CATARACT EXTRACTION WITH INTRA OCULAR LENS IMPLANT LEFT EYE    ANESTHESIA: Topical with intravenous sedation    TYPE OF LENS IMPLANT USED:   Implant Name Type Inv. Item Serial No.  Lot No. LRB No. Used Action   LENS IOL POST 1-PC 6X13 22.0 -- [employer] - [id number]   LENS IOL POST 1-PC 6X13 22.0 -- [employer] [id number] [employer] NA Left 1 Implanted       PHACO TIME:   40 seconds at an average power of 21%. Estimated blood loss: None    Complications:None    Specimen removed: None    DESCRIPTION OF PROCEDURE:  The patient was brought to the holding area, where an IV was begun at the keep open rate. The patient received several instillations of Amish-Synephrine 2.5%, Cyclogyl 2%, and tetracaine 0.5% until adequate pupillary dilatation was achieved. The patient was connected to cardiovascular monitoring. Prior to being brought back to the operating suite, the patient received 2 drops of Betadine 5% solution into the inferior cul-de-sac of the operated eye. The patient was then brought back to the operating suite, and prepped and draped in the usual sterile manner after being re-connnected to cardiovascular monitoring. One drop of 4% Xylocaine was then instilled onto the eye. The lid speculum was set into position and the operating microscope was focused on the patient. Two drops of 4% Xylocaine were again instilled onto the eye. Using the fixation ring, the sharp 15-degree blade was used to create a paracentesis site and 1% MPF Xylocaine was instilled into the anterior chamber for anesthesia. Viscoelastic was then instilled into the anterior chamber.  The 2.4 mm keratome was then utilized to create a clear corneal incision, and a circular capsulorrhexis was performed. BSS was utilized to perform careful hydrodissection of the lens. Viscoelastic was then instilled into the anterior chamber to protect the corneal endothelium. Phacoemulsification was then carried out. Any remaining cortical material was removed in irrigation/aspiration mode. Viscoelastic was then instilled into the capsular bag. The lens implant was inspected for any defects. After finding none, the lens was placed in its injector and inserted into the capsular bag. The lens implant was centered on the patient's visual/astigmatic axis. The viscoelastic was then removed from the eye. Miochol was instilled posterior to the iris plane to facilitate pupillary miosis. The wound was checked for any leaks, after finding none, Iopidine and Pred Forte drops were instilled into the inferior cul-de-sac, and gentamicin ointment was placed over the cornea. A semi-pressure dressing and shield were then placed for the patient. The patient tolerated the procedure very well, and was brought to the recovery room in an alert and stable and satisfactory postoperative condition. Instructions were given to the patient and their family for their immediate postoperative care.   [address] [place] [address],   9/20/2017

## 2017-09-20 NOTE — DISCHARGE INSTRUCTIONS
Kimani Conn D.O., P.CHarish  [facility].  [phone]    Post-Operative Instructions for Cataract Surgery     Remove your eye shield and bandage at 12 noon the same day as surgery and start your eye drops. THROW AWAY THE GAUZE UNDER THE SHIELD.  Place the blue eye shield back on for one week while asleep, even if napping in the recliner. Use the tape included in your bag.  DO NOT RUB YOUR EYE EVER! DO NOT WIPE YOUR EYE! ONLY WIPE ON YOUR CHEEK!                DO NOT WEAR EYE MAKEUP FOR 1 WEEK!  You may take a bath today and you can shower starting tomorrow.  You may resume your previous diet.  If you use glaucoma drops in the operative eye, continue them as directed.  Common symptoms after surgery include a scratchy feeling, slight headache, red eye, and/or blurred vision. You may use Advil or Tylenol for any discomfort.  Avoid strenuous activities and driving until you see Dr. Heri Reed tomorrow. Please use care when walking, your depth perception may be altered.  Bring your bag with your drops to your follow up appointment. Below are Instructions On How To Use Your Eye Drops. ON THE DAY OF SURGERY:     Use all three eye drops at 12 noon, 4pm, and 8pm.  Wait 10 minutes between drops. THE DAY AFTER SURGERY:     You will use the drops 4 times a day at 8am, 12 noon, 4pm, and 8pm.   Use the drops every day until bottles are empty. Vigamox (tan top) Put 1 drop in at 8am, 12 noon, 4pm, and 8pm.    Pred Acetate (pink top) Put 1 drop in at 8:10am, 12:10pm, 4:10pm, and 8:10pm. Shake before using. Diclofenac Put 1 drop in at 8:20am, 12:20pm, 4:20pm, 8:20pm.    CONTINUE DROPS UNTIL ALL BOTTLES ARE EMPTY! Follow-up appointment tomorrow at Dr. Sendy Mayes office:________8:45____________    Please call the office at [phone] if you experience severe pain or nausea.      The following personal items collected during your admission are returned to you:   Dental Appliance:    Vision:    Hearing Aid: @FLOW  DISCHARGE SUMMARY from Nurse  (1341:LAST)@  Jewelry:    Clothing:    Other Valuables:    Valuables sent to safe:        PATIENT INSTRUCTIONS:    After General Anesthesia or Intravenous Sedation, for 24 hours or while taking prescription Narcotics:        Someone should be with you for the next 24 hours. For your own safety, a responsible adult must drive you home. · Limit your activities  · Recommended activity: Rest today, up with assistance today. Do not climb stairs or shower unattended for the next 24 hours. · Please start with a soft bland diet and advance as tolerated (no nausea) to regular diet. · If you have a sore throat you should try the following: fluids, warm salt water gargles, or throat lozenges. If it does not improve after several days please follow up with your primary physician. · Do not drive and operate hazardous machinery  · Do not make important personal or business decisions  · Do  not drink alcoholic beverages  · If you have not urinated within 8 hours after discharge, please contact your surgeon on call. Report the following to your surgeon:  · Excessive pain, swelling, redness or odor of or around the surgical area  · Temperature over 100.5  · Any nausea or vomiting. · You will receive a Post Operative Call from one of the Recovery Room Nurses on the day after your surgery to check on you. It is very important for us to know how you are recovering after your surgery. If you have an issue or need to speak with someone, please call your surgeon, do not wait for the post operative call. · You may receive an e-mail or letter in the mail from [place] regarding your experience with us in the Ambulatory Surgery Unit. Your feedback is valuable to us and we appreciate your participation in the survey.        · If the above instructions are not adequate, please contact Jaret Dotson RN, [facility] anesthesia Nurse Manager or our Anesthesiologist, at [phone]. If you are having problems after your surgery, call the physician at his office number. · We wish you a speedy recovery ? What to do at Home:      *  Please give a list of your current medications to your Primary Care Provider. *  Please update this list whenever your medications are discontinued, doses are      changed, or new medications (including over-the-counter products) are added. *  Please carry medication information at all times in case of emergency situations. These are general instructions for a healthy lifestyle:    No smoking/ No tobacco products/ Avoid exposure to second hand smoke    Surgeon General's Warning:  Quitting smoking now greatly reduces serious risk to your health. Obesity, smoking, and sedentary lifestyle greatly increases your risk for illness    A healthy diet, regular physical exercise & weight monitoring are important for maintaining a healthy lifestyle    You may be retaining fluid if you have a history of heart failure or if you experience any of the following symptoms:  Weight gain of 3 pounds or more overnight or 5 pounds in a week, increased swelling in our hands or feet or shortness of breath while lying flat in bed. Please call your doctor as soon as you notice any of these symptoms; do not wait until your next office visit. Recognize signs and symptoms of STROKE:    B - Balance  E - Eyes    F-  Face looks uneven    A-  Arms unable to move or move even    S-  Speech slurred or non-existent    T-  Time-call 911 as soon as signs and symptoms begin-DO NOT go       Back to bed or wait to see if you get better-TIME IS BRAIN. If you have not received your influenza and/or pneumococcal vaccine, please follow up with your primary care physician. The discharge information has been reviewed with the patient and family. The patient and family verbalized understanding.

## 2017-09-20 NOTE — ANESTHESIA POSTPROCEDURE EVALUATION
Post-Anesthesia Evaluation and Assessment    Patient: Mallory Rubio MRN: [id number]  SSN: [id number]    YOB: 1940  Age: 65 y.o. Sex: male       Cardiovascular Function/Vital Signs  Visit Vitals    BP (!) 142/93    Pulse 74    Temp 36.8 °C (98.2 °F)    Resp 18    Ht 5' 8\" (1.727 m)    Wt 89.5 kg (197 lb 6 oz)    SpO2 95%    BMI 30.01 kg/m2       Patient is status post MAC anesthesia for Procedure(s):  CATARACT EXTRACTION WITH INTRA OCULAR LENS IMPLANT LEFT EYE. Nausea/Vomiting: None    Postoperative hydration reviewed and adequate. Pain:  Pain Scale 1: Numeric (0 - 10) (09/20/17 0927)  Pain Intensity 1: 0 (09/20/17 0927)   Managed    Neurological Status:   Neuro (WDL): Exceptions to WDL (09/20/17 0924)  Neuro  Neurologic State: Alert (09/20/17 0924)  LUE Motor Response: Purposeful (09/20/17 0924)  LLE Motor Response: Purposeful (09/20/17 0924)  RUE Motor Response: Purposeful (09/20/17 0924)  RLE Motor Response: Purposeful (09/20/17 0924)   At baseline    Mental Status and Level of Consciousness: Arousable    Pulmonary Status:   O2 Device: Room air (09/20/17 0924)   Adequate oxygenation and airway patent    Complications related to anesthesia: None    Post-anesthesia assessment completed.  No concerns    Signed By: Jason Paz MD     September 20, 2017

## 2017-09-29 ENCOUNTER — OFFICE VISIT (OUTPATIENT)
Dept: INTERNAL MEDICINE CLINIC | Age: 77
End: 2017-09-29

## 2017-09-29 VITALS
DIASTOLIC BLOOD PRESSURE: 86 MMHG | TEMPERATURE: 97.5 F | OXYGEN SATURATION: 97 % | HEIGHT: 68 IN | HEART RATE: 64 BPM | WEIGHT: 202.2 LBS | SYSTOLIC BLOOD PRESSURE: 128 MMHG | BODY MASS INDEX: 30.65 KG/M2 | RESPIRATION RATE: 18 BRPM

## 2017-09-29 DIAGNOSIS — I12.9 BENIGN HYPERTENSION WITH CHRONIC KIDNEY DISEASE, STAGE III (HCC): Primary | ICD-10-CM

## 2017-09-29 DIAGNOSIS — R73.02 GLUCOSE INTOLERANCE (IMPAIRED GLUCOSE TOLERANCE): ICD-10-CM

## 2017-09-29 DIAGNOSIS — E78.2 HYPERLIPIDEMIA, MIXED: ICD-10-CM

## 2017-09-29 DIAGNOSIS — R29.6 FREQUENT FALLS: ICD-10-CM

## 2017-09-29 DIAGNOSIS — I25.10 CORONARY ARTERY DISEASE INVOLVING NATIVE CORONARY ARTERY OF NATIVE HEART WITHOUT ANGINA PECTORIS: ICD-10-CM

## 2017-09-29 DIAGNOSIS — Z23 ENCOUNTER FOR IMMUNIZATION: ICD-10-CM

## 2017-09-29 DIAGNOSIS — N18.30 BENIGN HYPERTENSION WITH CHRONIC KIDNEY DISEASE, STAGE III (HCC): Primary | ICD-10-CM

## 2017-09-29 NOTE — PATIENT INSTRUCTIONS
Try stretching twice a day and leg cramps will probably be less  Can also take Vitamin B6 100 mg daily. Office visit in 6 months with fasting lab work a week before visit. Vaccine Information Statement    Influenza (Flu) Vaccine (Inactivated or Recombinant): What you need to know    Many Vaccine Information Statements are available in Italian and other languages. See www.immunize.org/vis  Hojas de Información Sobre Vacunas están disponibles en Español y en muchos otros idiomas. Visite www.immunize.org/vis    1. Why get vaccinated? Influenza (flu) is a contagious disease that spreads around the [place] every year, usually between October and May. Flu is caused by influenza viruses, and is spread mainly by coughing, sneezing, and close contact. Anyone can get flu. Flu strikes suddenly and can last several days. Symptoms vary by age, but can include:   fever/chills   sore throat   muscle aches   fatigue   cough   headache    runny or stuffy nose    Flu can also lead to pneumonia and blood infections, and cause diarrhea and seizures in children. If you have a medical condition, such as heart or lung disease, flu can make it worse. Flu is more dangerous for some people. Infants and young children, people 72years of age and older, pregnant women, and people with certain health conditions or a weakened immune system are at greatest risk. Each year thousands of people in the [facility] die from flu, and many more are hospitalized. Flu vaccine can:   keep you from getting flu,   make flu less severe if you do get it, and   keep you from spreading flu to your family and other people. 2. Inactivated and recombinant flu vaccines    A dose of flu vaccine is recommended every flu season. Children 6 months through 6years of age may need two doses during the same flu season. Everyone else needs only one dose each flu season.        Some inactivated flu vaccines contain a very small amount of a mercury-based preservative called thimerosal. Studies have not shown thimerosal in vaccines to be harmful, but flu vaccines that do not contain thimerosal are available. There is no live flu virus in flu shots. They cannot cause the flu. There are many flu viruses, and they are always changing. Each year a new flu vaccine is made to protect against three or four viruses that are likely to cause disease in the upcoming flu season. But even when the vaccine doesnt exactly match these viruses, it may still provide some protection    Flu vaccine cannot prevent:   flu that is caused by a virus not covered by the vaccine, or   illnesses that look like flu but are not. It takes about 2 weeks for protection to develop after vaccination, and protection lasts through the flu season. 3. Some people should not get this vaccine    Tell the person who is giving you the vaccine:     If you have any severe, life-threatening allergies. If you ever had a life-threatening allergic reaction after a dose of flu vaccine, or have a severe allergy to any part of this vaccine, you may be advised not to get vaccinated. Most, but not all, types of flu vaccine contain a small amount of egg protein.  If you ever had Guillain-Barré Syndrome (also called GBS). Some people with a history of GBS should not get this vaccine. This should be discussed with your doctor.  If you are not feeling well. It is usually okay to get flu vaccine when you have a mild illness, but you might be asked to come back when you feel better. 4. Risks of a vaccine reaction    With any medicine, including vaccines, there is a chance of reactions. These are usually mild and go away on their own, but serious reactions are also possible. Most people who get a flu shot do not have any problems with it.      Minor problems following a flu shot include:    soreness, redness, or swelling where the shot was given     hoarseness   sore, red or itchy eyes   cough   fever   aches   headache   itching   fatigue  If these problems occur, they usually begin soon after the shot and last 1 or 2 days. More serious problems following a flu shot can include the following:     There may be a small increased risk of Guillain-Barré Syndrome (GBS) after inactivated flu vaccine. This risk has been estimated at 1 or 2 additional cases per million people vaccinated. This is much lower than the risk of severe complications from flu, which can be prevented by flu vaccine.  Young children who get the flu shot along with pneumococcal vaccine (PCV13) and/or DTaP vaccine at the same time might be slightly more likely to have a seizure caused by fever. Ask your doctor for more information. Tell your doctor if a child who is getting flu vaccine has ever had a seizure. Problems that could happen after any injected vaccine:      People sometimes faint after a medical procedure, including vaccination. Sitting or lying down for about 15 minutes can help prevent fainting, and injuries caused by a fall. Tell your doctor if you feel dizzy, or have vision changes or ringing in the ears.  Some people get severe pain in the shoulder and have difficulty moving the arm where a shot was given. This happens very rarely.  Any medication can cause a severe allergic reaction. Such reactions from a vaccine are very rare, estimated at about 1 in a million doses, and would happen within a few minutes to a few hours after the vaccination. As with any medicine, there is a very remote chance of a vaccine causing a serious injury or death. The safety of vaccines is always being monitored. For more information, visit: www.cdc.gov/vaccinesafety/    5. What if there is a serious reaction? What should I look for?      Look for anything that concerns you, such as signs of a severe allergic reaction, very high fever, or unusual behavior. Signs of a severe allergic reaction can include hives, swelling of the face and throat, difficulty breathing, a fast heartbeat, dizziness, and weakness  usually within a few minutes to a few hours after the vaccination. What should I do?  If you think it is a severe allergic reaction or other emergency that cant wait, call 9-1-1 and get the person to the nearest hospital. Otherwise, call your doctor.  Reactions should be reported to the Vaccine Adverse Event Reporting System (VAERS). Your doctor should file this report, or you can do it yourself through  the VAERS web site at www.vaers. [facility].gov, or by calling 1-841.712.4413. VAERS does not give medical advice. 6. The National Vaccine Injury Compensation Program    The [facility] Vaccine Injury Compensation Program (VICP) is a federal program that was created to compensate people who may have been injured by certain vaccines. Persons who believe they may have been injured by a vaccine can learn about the program and about filing a claim by calling 5-729.609.8044 or visiting the [place] website at www.[email or web address].gov/vaccinecompensation. There is a time limit to file a claim for compensation. 7. How can I learn more?  Ask your healthcare provider. He or she can give you the vaccine package insert or suggest other sources of information.  Call your local or state health department.  Contact the Centers for Disease Control and Prevention (CDC):  - Call 0-395.622.6168 (1-800-CDC-INFO) or  - Visit CDCs website at www.cdc.gov/flu    Vaccine Information Statement   Inactivated Influenza Vaccine   8/7/2015  42 MARINA Rios [id number]    Department of Health and Human Services  Centers for Disease Control and Prevention    Office Use Only

## 2017-09-29 NOTE — MR AVS SNAPSHOT
Visit Information Date & Time Provider Department Dept. Phone Encounter #  
 9/29/2017 10:00 AM Manoj Andrea MD [facility] [phone] [id number] Follow-up Instructions Return in about 6 months (around 3/29/2018) for HTN, chol, gluc, CAD  Fasting lab one week prior . Upcoming Health Maintenance Date Due DTaP/Tdap/Td series (1 - Tdap) 9/6/1960 ZOSTER VACCINE AGE 60> 7/6/1999 INFLUENZA AGE 9 TO ADULT 8/1/2017 COLONOSCOPY 1/1/2018 MEDICARE YEARLY EXAM 3/11/2018 GLAUCOMA SCREENING Q2Y 7/25/2019 Allergies as of 9/29/2017  Review Complete On: 9/29/2017 By: Manoj Andrea MD  
  
 Severity Noted Reaction Type Reactions Percocet [Oxycodone-acetaminophen]  09/22/2009    Other (comments) \"feel weird\" Current Immunizations  Reviewed on 9/29/2017 Name Date Influenza High Dose Vaccine PF  Incomplete, 9/14/2015 Influenza Vaccine (Quad) PF 9/9/2016 Pneumococcal Conjugate (PCV-13) 3/12/2015 ZZZ-RETIRED (DO NOT USE) Pneumococcal Vaccine (Unspecified Type) 4/22/2010 Reviewed by Patrick Maddox LPN on 2/63/2486 at  9:58 AM  
You Were Diagnosed With   
  
 Codes Comments Benign hypertension with chronic kidney disease, stage III    -  Primary ICD-10-CM: I12.9, N18.3 ICD-9-CM: 403.10, 585.3 Hyperlipidemia, mixed     ICD-10-CM: E78.2 ICD-9-CM: 272.2 Coronary artery disease involving native coronary artery of native heart without angina pectoris     ICD-10-CM: I25.10 ICD-9-CM: 414.01 Glucose intolerance (impaired glucose tolerance)     ICD-10-CM: R73.02 
ICD-9-CM: 790.22 Frequent falls     ICD-10-CM: R29.6 ICD-9-CM: V15.88 Encounter for immunization     ICD-10-CM: E28 ICD-9-CM: V03.89 Vitals BP Pulse Temp Resp Height(growth percentile) Weight(growth percentile)  128/86 (BP 1 Location: Left arm, BP Patient Position: Sitting) 64 97.5 °F (36.4 °C) (Oral) 18 5' 8\" (1.727 m) 202 lb 3.2 oz (91.7 kg) SpO2 BMI Smoking Status 97% 30.74 kg/m2 Never Smoker Vitals History BMI and BSA Data Body Mass Index Body Surface Area 30.74 kg/m 2 2.1 m 2 Preferred Pharmacy Pharmacy Name Phone [facility] [place] [place] [phone] Your Updated Medication List  
  
   
This list is accurate as of: 9/29/17 10:38 AM.  Always use your most recent med list.  
  
  
  
  
 acetaminophen 500 mg tablet Commonly known as:  TYLENOL Take 500 mg by mouth daily as needed for Pain. amLODIPine 10 mg tablet Commonly known as:  Sola Glorieta Take 1 Tab by mouth daily. Indications: hypertension  
  
 aspirin 81 mg chewable tablet Take 1 Tab by mouth nightly. atorvastatin 80 mg tablet Commonly known as:  LIPITOR  
TAKE ONE TABLET ONCE DAILY  
  
 carvedilol 12.5 mg tablet Commonly known as:  Chiquita Finder Take 1 Tab by mouth two (2) times daily (with meals). cholecalciferol 1,000 unit Cap Commonly known as:  VITAMIN D3 Take  by mouth nightly. DULoxetine 60 mg capsule Commonly known as:  CYMBALTA Take 1 Cap by mouth daily. gabapentin 300 mg capsule Commonly known as:  NEURONTIN Take 1 Cap by mouth three (3) times daily. lisinopril-hydroCHLOROthiazide 20-12.5 mg per tablet Commonly known as:  PRINZIDE, ZESTORETIC  
TAKE TWO TABLETS ONCE DAILY * VIGAMOX 0.5 % ophthalmic solution Generic drug:  moxifloxacin Administer 1 Drop to left eye four (4) times daily. * moxifloxacin 0.5 % ophthalmic solution Commonly known as:  VIGAMOX  
instill 1 drop into right eye four times a day until finished - S...  (REFER TO PRESCRIPTION NOTES). * Notice: This list has 2 medication(s) that are the same as other medications prescribed for you. Read the directions carefully, and ask your doctor or other care provider to review them with you. We Performed the Following ADMIN INFLUENZA VIRUS VAC [ [facility]] INFLUENZA VIRUS VACCINE, HIGH DOSE SEASONAL, PRESERVATIVE FREE [89860 CPT(R)] REFERRAL TO NEUROLOGY [SDM88 Custom] Follow-up Instructions Return in about 6 months (around 3/29/2018) for HTN, chol, gluc, CAD  Fasting lab one week prior . To-Do List   
 03/29/2018 Lab:  HEMOGLOBIN A1C WITH EAG   
  
 03/29/2018 Lab:  LIPID PANEL   
  
 03/29/2018 Lab:  METABOLIC PANEL, COMPREHENSIVE Referral Information Referral ID Referred By Referred To  
  
 [id number] [facility], [address] [place] [address], [facility] [address] [place], [address] [place] [address] Phone: [phone] Fax: [phone] Visits Status Start Date End Date 1 New Request 9/29/17 9/29/18 If your referral has a status of pending review or denied, additional information will be sent to support the outcome of this decision. Patient Instructions Try stretching twice a day and leg cramps will probably be less Can also take Vitamin B6 100 mg daily. Office visit in 6 months with fasting lab work a week before visit. Vaccine Information Statement Influenza (Flu) Vaccine (Inactivated or Recombinant): What you need to know Many Vaccine Information Statements are available in Citizen of [place] and other languages. See www.immunize.org/vis Hojas de Información Sobre Vacunas están disponibles en Español y en muchos otros idiomas. Visite www.immunize.org/vis 1. Why get vaccinated? Influenza (flu) is a contagious disease that spreads around the [place] every year, usually between October and May. Flu is caused by influenza viruses, and is spread mainly by coughing, sneezing, and close contact. Anyone can get flu. Flu strikes suddenly and can last several days. Symptoms vary by age, but can include: 
 fever/chills  sore throat  muscle aches  fatigue  cough  headache  runny or stuffy nose Flu can also lead to pneumonia and blood infections, and cause diarrhea and seizures in children. If you have a medical condition, such as heart or lung disease, flu can make it worse. Flu is more dangerous for some people. Infants and young children, people 72years of age and older, pregnant women, and people with certain health conditions or a weakened immune system are at greatest risk. Each year thousands of people in the [facility] die from flu, and many more are hospitalized. Flu vaccine can: 
 keep you from getting flu, 
 make flu less severe if you do get it, and 
 keep you from spreading flu to your family and other people. 2. Inactivated and recombinant flu vaccines A dose of flu vaccine is recommended every flu season. Children 6 months through 6years of age may need two doses during the same flu season. Everyone else needs only one dose each flu season. Some inactivated flu vaccines contain a very small amount of a mercury-based preservative called thimerosal. Studies have not shown thimerosal in vaccines to be harmful, but flu vaccines that do not contain thimerosal are available. There is no live flu virus in flu shots. They cannot cause the flu. There are many flu viruses, and they are always changing. Each year a new flu vaccine is made to protect against three or four viruses that are likely to cause disease in the upcoming flu season. But even when the vaccine doesnt exactly match these viruses, it may still provide some protection Flu vaccine cannot prevent: 
 flu that is caused by a virus not covered by the vaccine, or 
 illnesses that look like flu but are not. It takes about 2 weeks for protection to develop after vaccination, and protection lasts through the flu season. 3. Some people should not get this vaccine Tell the person who is giving you the vaccine:  If you have any severe, life-threatening allergies. If you ever had a life-threatening allergic reaction after a dose of flu vaccine, or have a severe allergy to any part of this vaccine, you may be advised not to get vaccinated. Most, but not all, types of flu vaccine contain a small amount of egg protein.  If you ever had Guillain-Barré Syndrome (also called GBS). Some people with a history of GBS should not get this vaccine. This should be discussed with your doctor.  If you are not feeling well. It is usually okay to get flu vaccine when you have a mild illness, but you might be asked to come back when you feel better. 4. Risks of a vaccine reaction With any medicine, including vaccines, there is a chance of reactions. These are usually mild and go away on their own, but serious reactions are also possible. Most people who get a flu shot do not have any problems with it. Minor problems following a flu shot include:  
 soreness, redness, or swelling where the shot was given  hoarseness  sore, red or itchy eyes  cough  fever  aches  headache  itching  fatigue If these problems occur, they usually begin soon after the shot and last 1 or 2 days. More serious problems following a flu shot can include the following:  There may be a small increased risk of Guillain-Barré Syndrome (GBS) after inactivated flu vaccine. This risk has been estimated at 1 or 2 additional cases per million people vaccinated. This is much lower than the risk of severe complications from flu, which can be prevented by flu vaccine.  Young children who get the flu shot along with pneumococcal vaccine (PCV13) and/or DTaP vaccine at the same time might be slightly more likely to have a seizure caused by fever. Ask your doctor for more information. Tell your doctor if a child who is getting flu vaccine has ever had a seizure. Problems that could happen after any injected vaccine:  People sometimes faint after a medical procedure, including vaccination. Sitting or lying down for about 15 minutes can help prevent fainting, and injuries caused by a fall. Tell your doctor if you feel dizzy, or have vision changes or ringing in the ears.  Some people get severe pain in the shoulder and have difficulty moving the arm where a shot was given. This happens very rarely.  Any medication can cause a severe allergic reaction. Such reactions from a vaccine are very rare, estimated at about 1 in a million doses, and would happen within a few minutes to a few hours after the vaccination. As with any medicine, there is a very remote chance of a vaccine causing a serious injury or death. The safety of vaccines is always being monitored. For more information, visit: www.cdc.gov/vaccinesafety/ 
 
 
6. The National Vaccine Injury Compensation Program 
 
The [facility] Vaccine Injury Compensation Program (VICP) is a federal program that was created to compensate people who may have been injured by certain vaccines. Persons who believe they may have been injured by a vaccine can learn about the program and about filing a claim by calling 7-757.161.3006 or visiting the [place] website at www.[facility].gov/vaccinecompensation. There is a time limit to file a claim for compensation. 7. How can I learn more?  Ask your healthcare provider. He or she can give you the vaccine package insert or suggest other sources of information.  Call your local or state health department.  Contact the Centers for Disease Control and Prevention (CDC): 
- Call 5-370.406.9198 (1-511-ILS-INFO) or 
- Visit CDCs website at www.cdc.gov/flu Vaccine Information Statement Inactivated Influenza Vaccine 8/7/2015 
42 MARINA Riojas 231GU-20 [facility] of [facility] and [employer] Map Centers for Disease Control and Prevention Office Use Only Introducing [facility]! Martha Oleary introduces Identyx patient portal. Now you can access parts of your medical record, email your doctor's office, and request medication refills online. 1. In your internet browser, go to [email or web address] 2. Click on the First Time User? Click Here link in the Sign In box. You will see the New Member Sign Up page. 3. Enter your Identyx Access Code exactly as it appears below. You will not need to use this code after youve completed the sign-up process. If you do not sign up before the expiration date, you must request a new code. · Identyx Access Code: [email or web address] Expires: 10/29/2017 10:38 AM 
 
4. Enter the last four digits of your Social Security Number (xxxx) and Date of Birth (mm/dd/yyyy) as indicated and click Submit. You will be taken to the next sign-up page. 5. Create a Identyx ID. This will be your Identyx login ID and cannot be changed, so think of one that is secure and easy to remember. 6. Create a tradeNOW password. You can change your password at any time. 7. Enter your Password Reset Question and Answer. This can be used at a later time if you forget your password. 8. Enter your e-mail address. You will receive e-mail notification when new information is available in [address] 9. Click Sign Up. You can now view and download portions of your medical record. 10. Click the Download Summary menu link to download a portable copy of your medical information. If you have questions, please visit the Frequently Asked Questions section of the tradeNOW website. Remember, tradeNOW is NOT to be used for urgent needs. For medical emergencies, dial 911. Now available from your iPhone and Android! Please provide this summary of care documentation to your next provider. Your primary care clinician is listed as Miladys Beckford. If you have any questions after today's visit, please call [phone].

## 2017-09-29 NOTE — PROGRESS NOTES
HISTORY OF PRESENT ILLNESS  Aida Mei is a 65 y.o. male. HPI  He presents for follow up of hypertension, hyperlipidemia, coronary artery disease, status post CABG and glucose intolerance. Diet and Lifestyle: generally follows a low fat low cholesterol diet, not attempting to follow a low sodium diet, does not rigorously follow a diabetic diet, exercises sporadically, nonsmoker  Medication compliance: compliant all of the time  Medication side effects: none  Home BP Monitoring:  is well controlled at home, ranging 140's/80's  Cardiovascular ROS:  He denies palpitations, orthopnea, exertional chest pressure/discomfort, claudication, lower extremity edema, dyspnea on exertion, dizziness     Frequent falls. Falls forward with no provocation. Has fallen 3 times in past 6 months. Has not been injured. Feet stop but body still wants to go. Nocturnal cramps in legs.     Patient Active Problem List   Diagnosis Code    Hyperlipidemia, mixed E78.2    Impotence N52.9    Diverticulosis K57.90    Hemorrhoids K64.9    Vitamin D deficiency E55.9    Benign hypertension with chronic kidney disease, stage III I12.9, N18.3    GERD (gastroesophageal reflux disease) K21.9    Intermittent angle-closure glaucoma H40.239    CAD (coronary artery disease), native coronary artery I25.10    Nephrolithiasis N20.0    Left median nerve neuropathy G56.12    CKD (chronic kidney disease) stage 3, GFR 30-59 ml/min N18.3    Advance directive discussed with patient Z71.89    Lumbar stenosis with neurogenic claudication M48.06    Unequal blood pressure in upper extremities R09.89    Glucose intolerance (impaired glucose tolerance) R73.02    Combined forms of age-related cataract of right eye H25.811    Combined forms of age-related cataract of left eye H25.812     Past Medical History:   Diagnosis Date    A-fib ([facility] [address]     Arthritis     CAD (coronary artery disease) 12/24/2013    Cataract     Diverticulosis 9/22/2009    Dyslipidemia 9/22/2009    Hemorrhoids 9/22/2009    HTN 9/22/2009    Hypercholesterolemia     Impotence 9/22/2009    MI (myocardial infarction) ([facility] [address] 12/2013    Nausea & vomiting     Neuropathy ([facility] [address] 9/22/2009    Open angle glaucoma with borderline intraocular pressure     Vitamin D deficiency 9/22/2009     Past Surgical History:   Procedure Laterality Date    ENDOSCOPY, COLON, DIAGNOSTIC  540054    HX CATARACT REMOVAL Right 09/13/2017    HX CORONARY ARTERY BYPASS GRAFT  12/2013    triple    HX HERNIA REPAIR Bilateral     HX LITHOTRIPSY  655239    HX ORTHOPAEDIC      back surgery     Social History     Social History    Marital status:      Spouse name: N/A    Number of children: N/A    Years of education: N/A     Social History Main Topics    Smoking status: Never Smoker    Smokeless tobacco: Never Used    Alcohol use No    Drug use: No    Sexual activity: Yes     Partners: Female     Other Topics Concern    None     Social History Narrative     Family History   Problem Relation Age of Onset    Hypertension Mother     Diabetes Mother     Cancer Father      LIVER    Alcohol abuse Brother     Diabetes Sister     Hypertension Sister     Arthritis-osteo Sister     Kidney Disease Sister      DIALYSIS    Alcohol abuse Brother     Suicide Brother     No Known Problems Brother     No Known Problems Brother     No Known Problems Brother     Arthritis-osteo Brother     Hypertension Son     Hypertension Son     Anesth Problems Neg Hx      Allergies   Allergen Reactions    Percocet [Oxycodone-Acetaminophen] Other (comments)     \"feel weird\"     Current Outpatient Prescriptions   Medication Sig Dispense Refill    moxifloxacin (VIGAMOX) 0.5 % ophthalmic solution Administer 1 Drop to left eye four (4) times daily.  acetaminophen (TYLENOL) 500 mg tablet Take 500 mg by mouth daily as needed for Pain.       moxifloxacin (VIGAMOX) 0.5 % ophthalmic solution instill 1 drop into right eye four times a day until finished - S...  (REFER TO PRESCRIPTION NOTES). 0    amLODIPine (NORVASC) 10 mg tablet Take 1 Tab by mouth daily. Indications: hypertension 30 Tab 11    lisinopril-hydroCHLOROthiazide (PRINZIDE, ZESTORETIC) 20-12.5 mg per tablet TAKE TWO TABLETS ONCE DAILY 60 Tab 11    atorvastatin (LIPITOR) 80 mg tablet TAKE ONE TABLET ONCE DAILY 30 Tab 11    carvedilol (COREG) 12.5 mg tablet Take 1 Tab by mouth two (2) times daily (with meals). 60 Tab 5    DULoxetine (CYMBALTA) 60 mg capsule Take 1 Cap by mouth daily. 30 Cap 5    aspirin 81 mg chewable tablet Take 1 Tab by mouth nightly. 90 Tab 3    gabapentin (NEURONTIN) 300 mg capsule Take 1 Cap by mouth three (3) times daily. 90 Cap 5    cholecalciferol (VITAMIN D3) 1,000 unit cap Take  by mouth nightly. Review of Systems   Constitutional: Positive for malaise/fatigue. Negative for weight loss. Gastrointestinal: Negative for constipation, diarrhea and heartburn. Musculoskeletal: Positive for back pain and joint pain. Neurological: Negative for dizziness, tingling and focal weakness. Visit Vitals    /86 (BP 1 Location: Left arm, BP Patient Position: Sitting)    Pulse 64    Temp 97.5 °F (36.4 °C) (Oral)    Resp 18    Ht 5' 8\" (1.727 m)    Wt 202 lb 3.2 oz (91.7 kg)    SpO2 97%    BMI 30.74 kg/m2     Physical Exam   Constitutional: He is oriented to person, place, and time. He appears well-developed and well-nourished. HENT:   Head: Normocephalic and atraumatic. Eyes: Conjunctivae are normal. Pupils are equal, round, and reactive to light. Neck: Neck supple. Carotid bruit is not present. No thyromegaly present. Cardiovascular: Normal rate, regular rhythm and normal heart sounds. PMI is not displaced. Exam reveals no gallop. No murmur heard. Pulses:       Dorsalis pedis pulses are 2+ on the right side, and 2+ on the left side.         Posterior tibial pulses are 2+ on the right side, and 2+ on the left side. Pulmonary/Chest: Effort normal. He has no wheezes. He has no rhonchi. He has no rales. Abdominal: Soft. Normal appearance. He exhibits no abdominal bruit and no mass. There is no hepatosplenomegaly. There is no tenderness. Musculoskeletal: He exhibits no edema. Lymphadenopathy:     He has no cervical adenopathy. Right: No supraclavicular adenopathy present. Left: No supraclavicular adenopathy present. Neurological: He is alert and oriented to person, place, and time. He displays no tremor. No sensory deficit. Abnormal muscle tone: ? cogwheeling. Coordination and gait normal.   Reflex Scores:       Patellar reflexes are 2+ on the right side and 2+ on the left side. Skin: Skin is warm, dry and intact. No rash noted. Psychiatric: He has a normal mood and affect. His behavior is normal.   Nursing note and vitals reviewed. Results for orders placed or performed in visit on 73/60/39   METABOLIC PANEL, BASIC   Result Value Ref Range    Glucose 99 65 - 99 mg/dL    BUN 14 8 - 27 mg/dL    Creatinine 1.04 0.76 - 1.27 mg/dL    GFR est non-AA 68 >59 mL/min/1.73    GFR est AA 79 >59 mL/min/1.73    BUN/Creatinine ratio 13 10 - 24    Sodium 144 134 - 144 mmol/L    Potassium 4.1 3.5 - 5.2 mmol/L    Chloride 104 96 - 106 mmol/L    CO2 27 18 - 29 mmol/L    Calcium 9.2 8.6 - 10.2 mg/dL   HEMOGLOBIN A1C WITH EAG   Result Value Ref Range    Hemoglobin A1c 5.6 4.8 - 5.6 %    Estimated average glucose 114 mg/dL   CKD REPORT   Result Value Ref Range    Interpretation Note      Lab Results   Component Value Date/Time    Cholesterol, total 143 03/03/2017 08:50 AM    HDL Cholesterol 49 03/03/2017 08:50 AM    LDL, calculated 69 03/03/2017 08:50 AM    VLDL, calculated 25 03/03/2017 08:50 AM    Triglyceride 127 03/03/2017 08:50 AM    CHOL/HDL Ratio 2.7 07/09/2010 09:43 AM       ASSESSMENT and PLAN    ICD-10-CM ICD-9-CM    1.  Benign hypertension with chronic kidney disease, stage III I12.9 403.10 N18.3 585.3    2. Hyperlipidemia, mixed E78.2 272.2 LIPID PANEL      METABOLIC PANEL, COMPREHENSIVE   3. Glucose intolerance (impaired glucose tolerance) R73.02 790.22 HEMOGLOBIN A1C WITH EAG   4. Coronary artery disease involving native coronary artery of native heart without angina pectoris I25.10 414.01    5. Frequent falls R29.6 V15.88 REFERRAL TO NEUROLOGY   6. Encounter for immunization Z23 V03.89 INFLUENZA VIRUS VACCINE, HIGH DOSE SEASONAL, PRESERVATIVE FREE      ADMIN INFLUENZA VIRUS VAC     Diagnoses and all orders for this visit:    1. Benign hypertension with chronic kidney disease, stage III  Blood pressure is at goal and creatinine is stable to improved. 2. Hyperlipidemia, mixed  Hyperlipidemia is controlled  -     LIPID PANEL; Future  -     METABOLIC PANEL, COMPREHENSIVE; Future    3. Glucose intolerance (impaired glucose tolerance)  -     HEMOGLOBIN A1C WITH EAG; Future    4. Coronary artery disease involving native coronary artery of native heart without angina pectoris    5. Frequent falls  Not typical for PD, but consider PSP, MSA or other neurologic problem. If all are pronounced absent, physical therapy may help. -     REFERRAL TO NEUROLOGY    6. Encounter for immunization  -     Influenza virus vaccine ([address]) 72 years and older (02843)  -     Administration fee () for Medicare insured patients      Follow-up Disposition:  Return in about 6 months (around 3/29/2018) for HTN, chol, gluc, CAD  Fasting lab one week prior . lab results and schedule of future lab studies reviewed with patient  reviewed diet, exercise and weight control  reviewed medications and side effects in detail  I have discussed the diagnosis with the patient and the intended plan as seen in the above orders. Patient is in agreement. The patient has received an after-visit summary and questions were answered concerning future plans.   I have discussed medication side effects and warnings with the patient as well.

## 2017-09-29 NOTE — PROGRESS NOTES
Room 9  Chief Complaint   Patient presents with    Well Male       Patient states he his here for a follow up visit     1. Have you been to the ER, urgent care clinic since your last visit? Hospitalized since your last visit? No    2. Have you seen or consulted any other health care providers outside of the [address] [place] [address] since your last visit? Include any pap smears or colon screening.  No

## 2018-01-03 DIAGNOSIS — M48.062 LUMBAR STENOSIS WITH NEUROGENIC CLAUDICATION: ICD-10-CM

## 2018-01-03 RX ORDER — DULOXETIN HYDROCHLORIDE 60 MG/1
CAPSULE, DELAYED RELEASE ORAL
Qty: 30 CAP | Refills: 5 | Status: SHIPPED | OUTPATIENT
Start: 2018-01-03 | End: 2018-08-22 | Stop reason: SDUPTHER

## 2018-01-15 DIAGNOSIS — I12.9 BENIGN HYPERTENSION WITH CHRONIC KIDNEY DISEASE, STAGE III (HCC): ICD-10-CM

## 2018-01-15 DIAGNOSIS — N18.30 BENIGN HYPERTENSION WITH CHRONIC KIDNEY DISEASE, STAGE III (HCC): ICD-10-CM

## 2018-01-15 RX ORDER — CARVEDILOL 12.5 MG/1
12.5 TABLET ORAL 2 TIMES DAILY WITH MEALS
Qty: 60 TAB | Refills: 11 | Status: SHIPPED | OUTPATIENT
Start: 2018-01-15 | End: 2018-09-05 | Stop reason: SDUPTHER

## 2018-01-15 RX ORDER — AMLODIPINE BESYLATE 10 MG/1
10 TABLET ORAL DAILY
Qty: 30 TAB | Refills: 11 | Status: SHIPPED | OUTPATIENT
Start: 2018-01-15 | End: 2019-04-11 | Stop reason: SDUPTHER

## 2018-01-15 NOTE — TELEPHONE ENCOUNTER
PCP: Antonino Kunz MD     Last appt: 9/29/2017   Future Appointments  Date Time Provider Elia Zamorai   3/22/2018 8:45 AM LAB [facility] JORDNO SCHED   3/29/2018 8:45 AM Antonino Kunz MD [facility] JORDON SCHED        Requested Prescriptions     Pending Prescriptions Disp Refills    carvedilol (COREG) 12.5 mg tablet 60 Tab 11     Sig: Take 1 Tab by mouth two (2) times daily (with meals).  amLODIPine (NORVASC) 10 mg tablet 30 Tab 11     Sig: Take 1 Tab by mouth daily.  Indications: hypertension

## 2018-03-29 ENCOUNTER — HOSPITAL ENCOUNTER (OUTPATIENT)
Dept: LAB | Age: 78
Discharge: HOME | End: 2018-03-29
Payer: MEDICARE

## 2018-03-29 ENCOUNTER — OFFICE VISIT (OUTPATIENT)
Dept: INTERNAL MEDICINE CLINIC | Facility: CLINIC | Age: 78
End: 2018-03-29

## 2018-03-29 VITALS
SYSTOLIC BLOOD PRESSURE: 134 MMHG | RESPIRATION RATE: 20 BRPM | OXYGEN SATURATION: 96 % | HEART RATE: 56 BPM | HEIGHT: 68 IN | WEIGHT: 210 LBS | BODY MASS INDEX: 31.83 KG/M2 | DIASTOLIC BLOOD PRESSURE: 77 MMHG | TEMPERATURE: 98 F

## 2018-03-29 DIAGNOSIS — E78.2 HYPERLIPIDEMIA, MIXED: ICD-10-CM

## 2018-03-29 DIAGNOSIS — E53.8 LOW VITAMIN B12 LEVEL: ICD-10-CM

## 2018-03-29 DIAGNOSIS — Z00.00 MEDICARE ANNUAL WELLNESS VISIT, SUBSEQUENT: Primary | ICD-10-CM

## 2018-03-29 DIAGNOSIS — Z71.89 ADVANCE DIRECTIVE DISCUSSED WITH PATIENT: ICD-10-CM

## 2018-03-29 DIAGNOSIS — R20.0 NUMBNESS OF LOWER EXTREMITY: ICD-10-CM

## 2018-03-29 DIAGNOSIS — I25.10 CORONARY ARTERY DISEASE INVOLVING NATIVE CORONARY ARTERY OF NATIVE HEART WITHOUT ANGINA PECTORIS: ICD-10-CM

## 2018-03-29 DIAGNOSIS — N18.30 BENIGN HYPERTENSION WITH CHRONIC KIDNEY DISEASE, STAGE III (HCC): ICD-10-CM

## 2018-03-29 DIAGNOSIS — I12.9 BENIGN HYPERTENSION WITH CHRONIC KIDNEY DISEASE, STAGE III (HCC): ICD-10-CM

## 2018-03-29 DIAGNOSIS — R73.02 GLUCOSE INTOLERANCE (IMPAIRED GLUCOSE TOLERANCE): ICD-10-CM

## 2018-03-29 PROCEDURE — 80053 COMPREHEN METABOLIC PANEL: CPT

## 2018-03-29 PROCEDURE — 80061 LIPID PANEL: CPT

## 2018-03-29 PROCEDURE — 82607 VITAMIN B-12: CPT

## 2018-03-29 PROCEDURE — 36415 COLL VENOUS BLD VENIPUNCTURE: CPT

## 2018-03-29 PROCEDURE — 83921 ORGANIC ACID SINGLE QUANT: CPT

## 2018-03-29 RX ORDER — METOPROLOL SUCCINATE 100 MG/1
100 TABLET, EXTENDED RELEASE ORAL DAILY
COMMUNITY
End: 2018-09-05

## 2018-03-29 NOTE — PROGRESS NOTES
HISTORY OF PRESENT ILLNESS  Ana Kan is a 65 y.o. male. HPI  He presents for follow up of hypertension with CKD, hyperlipidemia and coronary artery disease. Diet and Lifestyle: generally follows a low fat low cholesterol diet, generally follows a low sodium diet, does not rigorously follow a diabetic diet, exercises sporadically, nonsmoker  Medication compliance: compliant all of the time  Medication side effects: none  Home BP Monitorin's/80's  Cardiovascular ROS:  He denies palpitations, orthopnea, exertional chest pressure/discomfort, claudication, lower extremity edema, dyspnea on exertion     GARY helped back and bilateral leg pain and numbness in 2015, but duration of relief was very short (weeks). . He has pain only if he is standing or walking. Has problems with balance. Has frequent near miss falls. MRI near that time demonstrated a transitional lumbosacral vertebra, severe foraminal stenosis at L4-5 and L5-S1, moderate to severe central spinal stenosis at L4-5 and facet arthropathy at multiple levels. He says he is learning to live with the pain.     Patient Active Problem List   Diagnosis Code    Hyperlipidemia, mixed E78.2    Impotence N52.9    Diverticulosis K57.90    Hemorrhoids K64.9    Vitamin D deficiency E55.9    Benign hypertension with chronic kidney disease, stage III I12.9, N18.3    GERD (gastroesophageal reflux disease) K21.9    Intermittent angle-closure glaucoma H40.239    CAD (coronary artery disease), native coronary artery I25.10    Nephrolithiasis N20.0    Left median nerve neuropathy G56.12    CKD (chronic kidney disease) stage 3, GFR 30-59 ml/min N18.3    Advance directive discussed with patient Z71.89    Lumbar stenosis with neurogenic claudication M48.062    Unequal blood pressure in upper extremities R09.89    Glucose intolerance (impaired glucose tolerance) R73.02    Combined forms of age-related cataract of right eye H25.811    Combined forms of age-related cataract of left eye H25.812     Past Medical History:   Diagnosis Date    A-fib ([address]     Arthritis     CAD (coronary artery disease) 12/24/2013    Cataract     Diverticulosis 9/22/2009    Dyslipidemia 9/22/2009    Hemorrhoids 9/22/2009    HTN 9/22/2009    Hypercholesterolemia     Impotence 9/22/2009    MI (myocardial infarction) 12/2013    Nausea & vomiting     Neuropathy 9/22/2009    Open angle glaucoma with borderline intraocular pressure     Vitamin D deficiency 9/22/2009     Past Surgical History:   Procedure Laterality Date    ENDOSCOPY, COLON, DIAGNOSTIC  401207    HX CATARACT REMOVAL Right 09/13/2017    HX CORONARY ARTERY BYPASS GRAFT  12/2013    triple    HX HERNIA REPAIR Bilateral     HX LITHOTRIPSY  [id number]    HX ORTHOPAEDIC      back surgery     Social History     Social History    Marital status:      Spouse name: N/A    Number of children: N/A    Years of education: N/A     Social History Main Topics    Smoking status: Never Smoker    Smokeless tobacco: Never Used    Alcohol use No    Drug use: No    Sexual activity: Yes     Partners: Female     Other Topics Concern    None     Social History Narrative     Family History   Problem Relation Age of Onset    Hypertension Mother     Diabetes Mother     Cancer Father      LIVER    Alcohol abuse Brother     Diabetes Sister     Hypertension Sister     Arthritis-osteo Sister     Kidney Disease Sister      DIALYSIS    Alcohol abuse Brother     Suicide Brother     No Known Problems Brother     No Known Problems Brother     No Known Problems Brother     Arthritis-osteo Brother     Hypertension Son     Hypertension Son     Anesth Problems Neg Hx      Allergies   Allergen Reactions    Percocet [Oxycodone-Acetaminophen] Other (comments)     \"feel weird\"     Current Outpatient Prescriptions   Medication Sig Dispense Refill    metoprolol succinate (TOPROL-XL) 100 mg tablet Take 100 mg by mouth daily.  carvedilol (COREG) 12.5 mg tablet Take 1 Tab by mouth two (2) times daily (with meals). 60 Tab 11    amLODIPine (NORVASC) 10 mg tablet Take 1 Tab by mouth daily. Indications: hypertension 30 Tab 11    DULoxetine (CYMBALTA) 60 mg capsule take 1 capsule by mouth once daily 30 Cap 5    acetaminophen (TYLENOL) 500 mg tablet Take 500 mg by mouth daily as needed for Pain.  lisinopril-hydroCHLOROthiazide (PRINZIDE, ZESTORETIC) 20-12.5 mg per tablet TAKE TWO TABLETS ONCE DAILY 60 Tab 11    atorvastatin (LIPITOR) 80 mg tablet TAKE ONE TABLET ONCE DAILY 30 Tab 11    aspirin 81 mg chewable tablet Take 1 Tab by mouth nightly. 90 Tab 3    gabapentin (NEURONTIN) 300 mg capsule Take 1 Cap by mouth three (3) times daily. 90 Cap 5    cholecalciferol (VITAMIN D3) 1,000 unit cap Take  by mouth nightly. Review of Systems   Constitutional: Negative for malaise/fatigue and weight loss. Gastrointestinal: Negative for constipation and diarrhea. Musculoskeletal: Positive for back pain (radiates to both legs) and falls (body wants to go ahead of legs Fell 6 months ago, but has had many near misses. Legs being numb causes fall per him. ). Negative for joint pain. Neurological: Positive for dizziness (off balance). Negative for tingling and focal weakness. Visit Vitals    /77 (BP 1 Location: Left arm, BP Patient Position: Sitting)    Pulse (!) 56    Temp 98 °F (36.7 °C) (Oral)    Resp 20    Ht 5' 8\" (1.727 m)    Wt 210 lb (95.3 kg)    SpO2 96%    BMI 31.93 kg/m2     Physical Exam   Constitutional: He is oriented to person, place, and time. He appears well-developed and well-nourished. HENT:   Head: Normocephalic and atraumatic. Eyes: Conjunctivae are normal. Pupils are equal, round, and reactive to light. Neck: Neck supple. Carotid bruit is not present. No thyromegaly present. Cardiovascular: Normal rate, regular rhythm and normal heart sounds. PMI is not displaced.   Exam reveals no gallop. No murmur heard. Pulses:       Dorsalis pedis pulses are 0 on the right side, and 2+ on the left side. Posterior tibial pulses are 0 on the right side, and 1+ on the left side. Pulmonary/Chest: Effort normal. He has no wheezes. He has no rhonchi. He has no rales. Abdominal: Soft. Normal appearance. He exhibits no abdominal bruit and no mass. There is no hepatosplenomegaly. There is no tenderness. Musculoskeletal: He exhibits no edema. Lymphadenopathy:     He has no cervical adenopathy. Right: No supraclavicular adenopathy present. Left: No supraclavicular adenopathy present. Neurological: He is alert and oriented to person, place, and time. No sensory deficit. Skin: Skin is warm, dry and intact. No rash noted. Psychiatric: He has a normal mood and affect. His behavior is normal.   Nursing note and vitals reviewed.     Lab Results   Component Value Date/Time    Cholesterol, total 143 03/03/2017 08:50 AM    HDL Cholesterol 49 03/03/2017 08:50 AM    LDL, calculated 69 03/03/2017 08:50 AM    VLDL, calculated 25 03/03/2017 08:50 AM    Triglyceride 127 03/03/2017 08:50 AM    CHOL/HDL Ratio 2.7 07/09/2010 09:43 AM     Lab Results   Component Value Date/Time    Hemoglobin A1c 5.6 09/18/2017 09:15 AM    Hemoglobin A1c (POC) 5.5 04/17/2017 10:15 AM       Results for orders placed or performed in visit on 73/64/30   METABOLIC PANEL, COMPREHENSIVE   Result Value Ref Range    Glucose 111 (H) 65 - 99 mg/dL    BUN 17 8 - 27 mg/dL    Creatinine 1.22 0.76 - 1.27 mg/dL    GFR est non-AA 56 (L) >59 mL/min/1.73    GFR est AA 65 >59 mL/min/1.73    BUN/Creatinine ratio 14 10 - 24    Sodium 143 134 - 144 mmol/L    Potassium 4.5 3.5 - 5.2 mmol/L    Chloride 102 96 - 106 mmol/L    CO2 26 18 - 29 mmol/L    Calcium 9.3 8.6 - 10.2 mg/dL    Protein, total 6.4 6.0 - 8.5 g/dL    Albumin 4.2 3.5 - 4.8 g/dL    GLOBULIN, TOTAL 2.2 1.5 - 4.5 g/dL    A-G Ratio 1.9 1.2 - 2.2    Bilirubin, total 0.7 0.0 - 1.2 mg/dL    Alk. phosphatase 63 39 - 117 IU/L    AST (SGOT) 29 0 - 40 IU/L    ALT (SGPT) 24 0 - 44 IU/L   LIPID PANEL   Result Value Ref Range    Cholesterol, total 127 100 - 199 mg/dL    Triglyceride 104 0 - 149 mg/dL    HDL Cholesterol 40 >39 mg/dL    VLDL, calculated 21 5 - 40 mg/dL    LDL, calculated 66 0 - 99 mg/dL   VITAMIN B12   Result Value Ref Range    Vitamin B12 252 232 - 1245 pg/mL       ASSESSMENT and PLAN    ICD-10-CM ICD-9-CM    1. Medicare annual wellness visit, subsequent Z00.00 V70.0    2. Advance directive discussed with patient Z71.89 V65.49    3. Benign hypertension with chronic kidney disease, stage III P76.8 091.51 METABOLIC PANEL, BASIC    O62.5 585.3    4. Hyperlipidemia, mixed O88.3 419.1 METABOLIC PANEL, COMPREHENSIVE      LIPID PANEL   5. Glucose intolerance (impaired glucose tolerance) R73.02 790.22 HEMOGLOBIN A1C WITH EAG   6. Coronary artery disease involving native coronary artery of native heart without angina pectoris I25.10 414.01    7. Numbness of lower extremity R20.0 782.0 REFERRAL TO NEUROLOGY      VITAMIN B12   8. Low vitamin B12 level E53.8 266.2 METHYLMALONIC ACID     Diagnoses and all orders for this visit:    1. Medicare annual wellness visit, subsequent    2. Advance directive discussed with patient    3. Benign hypertension with chronic kidney disease, stage III  Blood pressure is at goal and creatinine is stable. -     METABOLIC PANEL, BASIC; Future    4. Hyperlipidemia, mixed  Hyperlipidemia is controlled  -     METABOLIC PANEL, COMPREHENSIVE  -     LIPID PANEL    5. Glucose intolerance (impaired glucose tolerance)  A1c is at his prior value of 5.5 on 4/2017.   -     HEMOGLOBIN A1C WITH EAG; Future    6. Coronary artery disease involving native coronary artery of native heart without angina pectoris  Takes BB and ASA. 7. Numbness of lower extremity  -     [facility] ref [id number]  -     VITAMIN B12    8.  Low vitamin B12 level  -     METHYLMALONIC ACID      Follow-up Disposition:  Return in about 6 months (around 9/29/2018) for HTN, chol, gluc,  non-fasting lab work a week before visit. Ruben Hassan lab results and schedule of future lab studies reviewed with patient  reviewed diet, exercise and weight control  cardiovascular risk and specific lipid/LDL goals reviewed  I have discussed the diagnosis with the patient and the intended plan as seen in the above orders. Patient is in agreement. The patient has received an after-visit summary and questions were answered concerning future plans. I have discussed side effects and warnings of any new medications with the patient as well.

## 2018-03-29 NOTE — PATIENT INSTRUCTIONS
Office visit in 6 months with non-fasting lab work a week before visit. The best way to stay healthy is to live a healthy lifestyle. A healthy lifestyle includes regular exercise, eating a well-balanced diet, keeping a healthy weight and not smoking. Regular physical exams and screening tests are another important way to take care of yourself. Preventive exams provided by health care providers can find health problems early when treatment works best and can keep you from getting certain diseases or illnesses. Preventive services include exams, lab tests, screenings, shots, monitoring and information to help you take care of your own health. All people over 65 should have a pneumonia shot. Pneumonia shots are usually only needed once in a lifetime unless your doctor decides differently. In addition to your physical exam, some screening tests are recommended:    All people over 65 should have a yearly flu shot. People over 65 are at medium to high risk for Hepatitis B. Three shots are needed for complete protection. Bone mass measurement (dexa scan) is recommended every two years. Diabetes Mellitus screening is recommended every year. Glaucoma is an eye disease caused by high pressure in the eye. An eye exam is recommended every year. Cardiovascular screening tests that check your cholesterol and other blood fat (lipid) levels are recommended every five years. Colorectal Cancer screening tests help to find pre-cancerous polyps (growths in the colon) so they can be removed before they turn into cancer. Tests ordered for screening depend on your personal and family history risk factors. Prostate Cancer Screening (annually up to age 76)    Screening for breast cancer is recommended yearly with a Mammogram.    Screening for cervical and vaginal cancer is recommended with a pelvic and Pap test every two years.  However if you have had an abnormal pap in the past  three years or at high risk for cervical or vaginal cancer Medicare will cover a pap test and a pelvic exam every year. Here is a list of your current Health Maintenance items with a due date:  Health Maintenance Due   Topic Date Due    DTaP/Tdap/Td  (1 - Tdap) 09/06/1960    Shingles Vaccine  07/06/1999    Annual Well Visit  03/14/2018            Well Visit, Over 72: Care Instructions  Your Care Instructions    Physical exams can help you stay healthy. Your doctor has checked your overall health and may have suggested ways to take good care of yourself. He or she also may have recommended tests. At home, you can help prevent illness with healthy eating, regular exercise, and other steps. Follow-up care is a key part of your treatment and safety. Be sure to make and go to all appointments, and call your doctor if you are having problems. It's also a good idea to know your test results and keep a list of the medicines you take. How can you care for yourself at home? · Reach and stay at a healthy weight. This will lower your risk for many problems, such as obesity, diabetes, heart disease, and high blood pressure. · Get at least 30 minutes of exercise on most days of the week. Walking is a good choice. You also may want to do other activities, such as running, swimming, cycling, or playing tennis or team sports. · Do not smoke. Smoking can make health problems worse. If you need help quitting, talk to your doctor about stop-smoking programs and medicines. These can increase your chances of quitting for good. · Protect your skin from too much sun. When you're outdoors from 10 a.m. to 4 p.m., stay in the shade or cover up with clothing and a hat with a wide brim. Wear sunglasses that block UV rays. Even when it's cloudy, put broad-spectrum sunscreen (SPF 30 or higher) on any exposed skin. · See a dentist one or two times a year for checkups and to have your teeth cleaned. · Wear a seat belt in the car.   · Limit alcohol to 2 drinks a day for men and 1 drink a day for women. Too much alcohol can cause health problems. Follow your doctor's advice about when to have certain tests. These tests can spot problems early. For men and women  · Cholesterol. Your doctor will tell you how often to have this done based on your overall health and other things that can increase your risk for heart attack and stroke. · Blood pressure. Have your blood pressure checked during a routine doctor visit. Your doctor will tell you how often to check your blood pressure based on your age, your blood pressure results, and other factors. · Diabetes. Ask your doctor whether you should have tests for diabetes. · Vision. Experts recommend that you have yearly exams for glaucoma and other age-related eye problems. · Hearing. Tell your doctor if you notice any change in your hearing. You can have tests to find out how well you hear. · Colon cancer tests. Keep having colon cancer tests as your doctor recommends. You can have one of several types of tests. · Heart attack and stroke risk. At least every 4 to 6 years, you should have your risk for heart attack and stroke assessed. Your doctor uses factors such as your age, blood pressure, cholesterol, and whether you smoke or have diabetes to show what your risk for a heart attack or stroke is over the next 10 years. · Osteoporosis. Talk to your doctor about whether you should have a bone density test to find out whether you have thinning bones. Also ask your doctor about whether you should take calcium and vitamin D supplements. For women  · Pap test and pelvic exam. You may no longer need a Pap test. Talk with your doctor about whether to stop or continue to have Pap tests. · Breast exam and mammogram. Ask how often you should have a mammogram, which is an X-ray of your breasts. A mammogram can spot breast cancer before it can be felt and when it is easiest to treat. · Thyroid disease.  Talk to your doctor about whether to have your thyroid checked as part of a regular physical exam. Women have an increased chance of a thyroid problem. For men  · Prostate exam. Talk to your doctor about whether you should have a blood test (called a PSA test) for prostate cancer. Experts disagree on whether men should have this test. Some experts recommend that you discuss the benefits and risks of the test with your doctor. · Abdominal aortic aneurysm. Ask your doctor whether you should have a test to check for an aneurysm. You may need a test if you ever smoked or if your parent, brother, sister, or child has had an aneurysm. When should you call for help? Watch closely for changes in your health, and be sure to contact your doctor if you have any problems or symptoms that concern you. Where can you learn more? Go to http://bridget-primo.info/. Enter W773 in the search box to learn more about \"Well Visit, Over 65: Care Instructions. \"  Current as of: May 12, 2017  Content Version: 11.4  © 7879-7737 [employer]. Care instructions adapted under license by [employer] (which disclaims liability or warranty for this information). If you have questions about a medical condition or this instruction, always ask your healthcare professional. [place] [address] any warranty or liability for your use of this information.

## 2018-03-29 NOTE — MR AVS SNAPSHOT
[address] [address] [phone] Patient: Iris Miles 
MRN: [id number] BVH:[id number] Visit Information Date & Time Provider Department Dept. Phone Encounter #  
 3/29/2018  8:45 AM Sugar Santo MD [facility] Internal Medicine of [address] [place] [address] [id number] Follow-up Instructions Return in about 6 months (around 9/29/2018) for HTN, chol, gluc,  non-fasting lab work a week before visit. Ruben Saunas Upcoming Health Maintenance Date Due DTaP/Tdap/Td series (1 - Tdap) 9/6/1960 ZOSTER VACCINE AGE 60> 7/6/1999 MEDICARE YEARLY EXAM 3/14/2018 GLAUCOMA SCREENING Q2Y 7/25/2019 Allergies as of 3/29/2018  Review Complete On: 3/29/2018 By: Sugar Santo MD  
  
 Severity Noted Reaction Type Reactions Percocet [Oxycodone-acetaminophen]  09/22/2009    Other (comments) \"feel weird\" Current Immunizations  Reviewed on 3/29/2018 Name Date Influenza High Dose Vaccine PF 9/29/2017, 9/14/2015 Influenza Vaccine (Quad) PF 9/9/2016 Pneumococcal Conjugate (PCV-13) 3/12/2015 ZZZ-RETIRED (DO NOT USE) Pneumococcal Vaccine (Unspecified Type) 4/22/2010 Reviewed by Russ Zimmer LPN on 3/63/5748 at  8:47 AM  
You Were Diagnosed With   
  
 Codes Comments Medicare annual wellness visit, subsequent    -  Primary ICD-10-CM: Z00.00 ICD-9-CM: V70.0 Advance directive discussed with patient     ICD-10-CM: Z71.89 ICD-9-CM: V65.49 Benign hypertension with chronic kidney disease, stage III     ICD-10-CM: I12.9, N18.3 ICD-9-CM: 403.10, 585.3 Hyperlipidemia, mixed     ICD-10-CM: E78.2 ICD-9-CM: 272.2 Glucose intolerance (impaired glucose tolerance)     ICD-10-CM: R73.02 
ICD-9-CM: 790.22 Coronary artery disease involving native coronary artery of native heart without angina pectoris     ICD-10-CM: I25.10 ICD-9-CM: 414.01   
 Numbness of lower extremity     ICD-10-CM: R20.0 ICD-9-CM: 949. 0 Vitals BP Pulse Temp Resp Height(growth percentile) Weight(growth percentile) 134/77 (BP 1 Location: Left arm, BP Patient Position: Sitting) (!) 56 98 °F (36.7 °C) (Oral) 20 5' 8\" (1.727 m) 210 lb (95.3 kg) SpO2 BMI Smoking Status 96% 31.93 kg/m2 Never Smoker BMI and BSA Data Body Mass Index Body Surface Area  
 31.93 kg/m 2 2.14 m 2 Preferred Pharmacy Pharmacy Name Phone [facility] [place] [phone] Your Updated Medication List  
  
   
This list is accurate as of 3/29/18  9:34 AM.  Always use your most recent med list.  
  
  
  
  
 acetaminophen 500 mg tablet Commonly known as:  TYLENOL Take 500 mg by mouth daily as needed for Pain. amLODIPine 10 mg tablet Commonly known as:  Skip Newcomer Take 1 Tab by mouth daily. Indications: hypertension  
  
 aspirin 81 mg chewable tablet Take 1 Tab by mouth nightly. atorvastatin 80 mg tablet Commonly known as:  LIPITOR  
TAKE ONE TABLET ONCE DAILY  
  
 carvedilol 12.5 mg tablet Commonly known as:  [employer] Take 1 Tab by mouth two (2) times daily (with meals). cholecalciferol 1,000 unit Cap Commonly known as:  VITAMIN D3 Take  by mouth nightly. DULoxetine 60 mg capsule Commonly known as:  CYMBALTA  
take 1 capsule by mouth once daily  
  
 gabapentin 300 mg capsule Commonly known as:  NEURONTIN Take 1 Cap by mouth three (3) times daily. lisinopril-hydroCHLOROthiazide 20-12.5 mg per tablet Commonly known as:  PRINZIDE, ZESTORETIC  
TAKE TWO TABLETS ONCE DAILY  
  
 metoprolol succinate 100 mg tablet Commonly known as:  TOPROL-XL Take 100 mg by mouth daily. We Performed the Following LIPID PANEL [43970 CPT(R)] METABOLIC PANEL, COMPREHENSIVE [55553 CPT(R)] REFERRAL TO NEUROLOGY [TWR65 Custom] VITAMIN B12 [id number] CPT(R)] Follow-up Instructions Return in about 6 months (around 9/29/2018) for HTN, chol, gluc,  non-fasting lab work a week before visit. Lisa Edosn To-Do List   
 09/29/2018 Lab:  HEMOGLOBIN A1C WITH EAG   
  
 09/29/2018 Lab:  METABOLIC PANEL, BASIC Referral Information Referral ID Referred By Referred To  
  
 [id number] [facility] [address] [place], [address] Phone: [phone] Fax: [phone] Visits Status Start Date End Date 1 New Request 3/29/18 3/29/19 If your referral has a status of pending review or denied, additional information will be sent to support the outcome of this decision. Patient Instructions Office visit in 6 months with non-fasting lab work a week before visit. The best way to stay healthy is to live a healthy lifestyle. A healthy lifestyle includes regular exercise, eating a well-balanced diet, keeping a healthy weight and not smoking. Regular physical exams and screening tests are another important way to take care of yourself. Preventive exams provided by health care providers can find health problems early when treatment works best and can keep you from getting certain diseases or illnesses. Preventive services include exams, lab tests, screenings, shots, monitoring and information to help you take care of your own health. All people over 65 should have a pneumonia shot. Pneumonia shots are usually only needed once in a lifetime unless your doctor decides differently. In addition to your physical exam, some screening tests are recommended: 
 
All people over 65 should have a yearly flu shot. People over 65 are at medium to high risk for Hepatitis B. Three shots are needed for complete protection. Bone mass measurement (dexa scan) is recommended every two years. Diabetes Mellitus screening is recommended every year. Glaucoma is an eye disease caused by high pressure in the eye. An eye exam is recommended every year. Cardiovascular screening tests that check your cholesterol and other blood fat (lipid) levels are recommended every five years. Colorectal Cancer screening tests help to find pre-cancerous polyps (growths in the colon) so they can be removed before they turn into cancer. Tests ordered for screening depend on your personal and family history risk factors. Prostate Cancer Screening (annually up to age 76) Screening for breast cancer is recommended yearly with a Mammogram. 
 
Screening for cervical and vaginal cancer is recommended with a pelvic and Pap test every two years. However if you have had an abnormal pap in the past  three years or at high risk for cervical or vaginal cancer Medicare will cover a pap test and a pelvic exam every year. Here is a list of your current Health Maintenance items with a due date: 
Health Maintenance Due Topic Date Due  
 DTaP/Tdap/Td  (1 - Tdap) 09/06/1960  Shingles Vaccine  07/06/1999 [facility] Annual Well Visit  03/14/2018 Well Visit, Over 72: Care Instructions Your Care Instructions Physical exams can help you stay healthy. Your doctor has checked your overall health and may have suggested ways to take good care of yourself. He or she also may have recommended tests. At home, you can help prevent illness with healthy eating, regular exercise, and other steps. Follow-up care is a key part of your treatment and safety. Be sure to make and go to all appointments, and call your doctor if you are having problems. It's also a good idea to know your test results and keep a list of the medicines you take. How can you care for yourself at home? · Reach and stay at a healthy weight. This will lower your risk for many problems, such as obesity, diabetes, heart disease, and high blood pressure. · Get at least 30 minutes of exercise on most days of the week. Walking is a good choice. You also may want to do other activities, such as running, swimming, cycling, or playing tennis or team sports. · Do not smoke. Smoking can make health problems worse. If you need help quitting, talk to your doctor about stop-smoking programs and medicines. These can increase your chances of quitting for good. · Protect your skin from too much sun. When you're outdoors from 10 a.m. to 4 p.m., stay in the shade or cover up with clothing and a hat with a wide brim. Wear sunglasses that block UV rays. Even when it's cloudy, put broad-spectrum sunscreen (SPF 30 or higher) on any exposed skin. · See a dentist one or two times a year for checkups and to have your teeth cleaned. · Wear a seat belt in the car. · Limit alcohol to 2 drinks a day for men and 1 drink a day for women. Too much alcohol can cause health problems. Follow your doctor's advice about when to have certain tests. These tests can spot problems early. For men and women · Cholesterol. Your doctor will tell you how often to have this done based on your overall health and other things that can increase your risk for heart attack and stroke. · Blood pressure. Have your blood pressure checked during a routine doctor visit. Your doctor will tell you how often to check your blood pressure based on your age, your blood pressure results, and other factors. · Diabetes. Ask your doctor whether you should have tests for diabetes. · Vision. Experts recommend that you have yearly exams for glaucoma and other age-related eye problems. · Hearing. Tell your doctor if you notice any change in your hearing. You can have tests to find out how well you hear. · Colon cancer tests. Keep having colon cancer tests as your doctor recommends. You can have one of several types of tests. · Heart attack and stroke risk.  At least every 4 to 6 years, you should have your risk for heart attack and stroke assessed. Your doctor uses factors such as your age, blood pressure, cholesterol, and whether you smoke or have diabetes to show what your risk for a heart attack or stroke is over the next 10 years. · Osteoporosis. Talk to your doctor about whether you should have a bone density test to find out whether you have thinning bones. Also ask your doctor about whether you should take calcium and vitamin D supplements. For women · Pap test and pelvic exam. You may no longer need a Pap test. Talk with your doctor about whether to stop or continue to have Pap tests. · Breast exam and mammogram. Ask how often you should have a mammogram, which is an X-ray of your breasts. A mammogram can spot breast cancer before it can be felt and when it is easiest to treat. · Thyroid disease. Talk to your doctor about whether to have your thyroid checked as part of a regular physical exam. Women have an increased chance of a thyroid problem. For men · Prostate exam. Talk to your doctor about whether you should have a blood test (called a PSA test) for prostate cancer. Experts disagree on whether men should have this test. Some experts recommend that you discuss the benefits and risks of the test with your doctor. · Abdominal aortic aneurysm. Ask your doctor whether you should have a test to check for an aneurysm. You may need a test if you ever smoked or if your parent, brother, sister, or child has had an aneurysm. When should you call for help? Watch closely for changes in your health, and be sure to contact your doctor if you have any problems or symptoms that concern you. Where can you learn more? Go to http://bridget-primo.info/. Enter T345 in the search box to learn more about \"Well Visit, Over 65: Care Instructions. \" Current as of: May 12, 2017 Content Version: 11.4 © 8602-0282 Healthwise, Incorporated.  Care instructions adapted under license by [address] (which disclaims liability or warranty for this information). If you have questions about a medical condition or this instruction, always ask your healthcare professional. [address] any warranty or liability for your use of this information. Introducing [facility]! Masni oLpez introduces [employer] patient portal. Now you can access parts of your medical record, email your doctor's office, and request medication refills online. 1. In your internet browser, go to [email or web address] 2. Click on the First Time User? Click Here link in the Sign In box. You will see the New Member Sign Up page. 3. Enter your [employer] Access Code exactly as it appears below. You will not need to use this code after youve completed the sign-up process. If you do not sign up before the expiration date, you must request a new code. · [employer] Access Code: [email or web address] Expires: 6/27/2018  9:34 AM 
 
4. Enter the last four digits of your Social Security Number (xxxx) and Date of Birth (mm/dd/yyyy) as indicated and click Submit. You will be taken to the next sign-up page. 5. Create a [employer] ID. This will be your [employer] login ID and cannot be changed, so think of one that is secure and easy to remember. 6. Create a [employer] password. You can change your password at any time. 7. Enter your Password Reset Question and Answer. This can be used at a later time if you forget your password. 8. Enter your e-mail address. You will receive e-mail notification when new information is available in [address] 9. Click Sign Up. You can now view and download portions of your medical record. 10. Click the Download Summary menu link to download a portable copy of your medical information. If you have questions, please visit the Frequently Asked Questions section of the [employer] website.  Remember, [employer] is NOT to be used for urgent needs. For medical emergencies, dial 911. Now available from your iPhone and Android! Please provide this summary of care documentation to your next provider. Your primary care clinician is listed as Breonna Pardeepole. If you have any questions after today's visit, please call [phone].

## 2018-03-29 NOTE — PROGRESS NOTES
This is the Subsequent Medicare Annual Wellness Exam, performed 12 months or more after the Initial AWV or the last Subsequent AWV    I have reviewed the patient's medical history in detail and updated the computerized patient record. History     Past Medical History:   Diagnosis Date    A-fib ([address]     Arthritis     CAD (coronary artery disease) 12/24/2013    Cataract     Diverticulosis 9/22/2009    Dyslipidemia 9/22/2009    Hemorrhoids 9/22/2009    HTN 9/22/2009    Hypercholesterolemia     Impotence 9/22/2009    MI (myocardial infarction) 12/2013    Nausea & vomiting     Neuropathy 9/22/2009    Open angle glaucoma with borderline intraocular pressure     Vitamin D deficiency 9/22/2009      Past Surgical History:   Procedure Laterality Date    ENDOSCOPY, COLON, DIAGNOSTIC  544977    HX CATARACT REMOVAL Right 09/13/2017    HX CORONARY ARTERY BYPASS GRAFT  12/2013    triple    HX HERNIA REPAIR Bilateral     HX LITHOTRIPSY  [id number]    HX ORTHOPAEDIC      back surgery     Current Outpatient Prescriptions   Medication Sig Dispense Refill    metoprolol succinate (TOPROL-XL) 100 mg tablet Take 100 mg by mouth daily.  carvedilol (COREG) 12.5 mg tablet Take 1 Tab by mouth two (2) times daily (with meals). 60 Tab 11    amLODIPine (NORVASC) 10 mg tablet Take 1 Tab by mouth daily. Indications: hypertension 30 Tab 11    DULoxetine (CYMBALTA) 60 mg capsule take 1 capsule by mouth once daily 30 Cap 5    acetaminophen (TYLENOL) 500 mg tablet Take 500 mg by mouth daily as needed for Pain.  lisinopril-hydroCHLOROthiazide (PRINZIDE, ZESTORETIC) 20-12.5 mg per tablet TAKE TWO TABLETS ONCE DAILY 60 Tab 11    atorvastatin (LIPITOR) 80 mg tablet TAKE ONE TABLET ONCE DAILY 30 Tab 11    aspirin 81 mg chewable tablet Take 1 Tab by mouth nightly. 90 Tab 3    gabapentin (NEURONTIN) 300 mg capsule Take 1 Cap by mouth three (3) times daily.  90 Cap 5    cholecalciferol (VITAMIN D3) 1,000 unit cap Take by mouth nightly. Allergies   Allergen Reactions    Percocet [Oxycodone-Acetaminophen] Other (comments)     \"feel weird\"     Family History   Problem Relation Age of Onset    Hypertension Mother     Diabetes Mother     Cancer Father      LIVER    Alcohol abuse Brother     Diabetes Sister     Hypertension Sister     Arthritis-osteo Sister     Kidney Disease Sister      DIALYSIS    Alcohol abuse Brother     Suicide Brother     No Known Problems Brother     No Known Problems Brother     No Known Problems Brother     Arthritis-osteo Brother     Hypertension Son     Hypertension Son     Anesth Problems Neg Hx      Social History   Substance Use Topics    Smoking status: Never Smoker    Smokeless tobacco: Never Used    Alcohol use No     Patient Active Problem List   Diagnosis Code    Hyperlipidemia, mixed E78.2    Impotence N52.9    Diverticulosis K57.90    Hemorrhoids K64.9    Vitamin D deficiency E55.9    Benign hypertension with chronic kidney disease, stage III I12.9, N18.3    GERD (gastroesophageal reflux disease) K21.9    Intermittent angle-closure glaucoma H40.239    CAD (coronary artery disease), native coronary artery I25.10    Nephrolithiasis N20.0    Left median nerve neuropathy G56.12    CKD (chronic kidney disease) stage 3, GFR 30-59 ml/min N18.3    Advance directive discussed with patient Z71.89    Lumbar stenosis with neurogenic claudication M48.062    Unequal blood pressure in upper extremities R09.89    Glucose intolerance (impaired glucose tolerance) R73.02    Combined forms of age-related cataract of right eye H25.811    Combined forms of age-related cataract of left eye H25.812       Depression Risk Factor Screening:     PHQ over the last two weeks 9/29/2017   Little interest or pleasure in doing things Not at all   Feeling down, depressed or hopeless Not at all   Total Score PHQ 2 0     Alcohol Risk Factor Screening:    You do not drink alcohol or very rarely. Functional Ability and Level of Safety:   Hearing Loss  Hearing is good. Activities of Daily Living  The home contains: handrails and rugs  Patient does total self care    Fall Risk  Fall Risk Assessment, last 12 mths 8/15/2017   Able to walk? Yes   Fall in past 12 months? No   Fall with injury? -   Number of falls in past 12 months -   Fall Risk Score -       Abuse Screen  Patient is not abused    Cognitive Screening   Evaluation of Cognitive Function:  Has your family/caregiver stated any concerns about your memory: no  Normal 3 word recall    Patient Care Team   Patient Care Team:  Viviana Albrecht MD as PCP - General (Internal Medicine)  Wenceslao Pro RN as Nurse Navigator    Assessment/Plan   Education and counseling provided:  End-of-Life planning (with patient's consent)    Diagnoses and all orders for this visit:    1. Benign hypertension with chronic kidney disease, stage III    2. Hyperlipidemia, mixed  -     METABOLIC PANEL, COMPREHENSIVE  -     LIPID PANEL    3. Glucose intolerance (impaired glucose tolerance)    4. Coronary artery disease involving native coronary artery of native heart without angina pectoris    5.  Numbness of lower extremity        Health Maintenance Due   Topic Date Due    DTaP/Tdap/Td series (1 - Tdap) 09/06/1960    ZOSTER VACCINE AGE 60>  07/06/1999    MEDICARE YEARLY EXAM  03/14/2018

## 2018-03-29 NOTE — PROGRESS NOTES
Nikita Thakur 73  Identified pt with two pt identifiers(name and ). Chief Complaint   Patient presents with    Blood Pressure Check    Cholesterol Problem    Blood sugar problem    Coronary Artery Disease       Reviewed record In preparation for visit and have obtained necessary documentation. Has info on advanced directive but has not filled them out. 1. Have you been to the ER, urgent care clinic or hospitalized since your last visit? No     2. Have you seen or consulted any other health care providers outside of the [address] [place] [address] since your last visit? Include any pap smears or colon screening. No    Vitals reviewed with provider.     Health Maintenance reviewed:     Health Maintenance Due   Topic    DTaP/Tdap/Td series (1 - Tdap)    ZOSTER VACCINE AGE 60>     MEDICARE YEARLY EXAM           Wt Readings from Last 3 Encounters:   18 210 lb (95.3 kg)   17 202 lb 3.2 oz (91.7 kg)   17 197 lb 6 oz (89.5 kg)        Temp Readings from Last 3 Encounters:   18 98 °F (36.7 °C) (Oral)   17 97.5 °F (36.4 °C) (Oral)   17 98.2 °F (36.8 °C)        BP Readings from Last 3 Encounters:   18 134/77   17 128/86   17 (!) 142/93        Pulse Readings from Last 3 Encounters:   18 (!) 56   17 64   17 74        Vitals:    18 0859   BP: 134/77   Pulse: (!) 56   Resp: 20   Temp: 98 °F (36.7 °C)   TempSrc: Oral   SpO2: 96%   Weight: 210 lb (95.3 kg)   Height: 5' 8\" (1.727 m)   PainSc:   2   PainLoc: Back          Learning Assessment:   :       Learning Assessment 10/14/2014   PRIMARY LEARNER Patient   HIGHEST LEVEL OF EDUCATION - PRIMARY LEARNER  GRADUATED HIGH SCHOOL OR GED   PRIMARY LANGUAGE ENGLISH   LEARNER PREFERENCE PRIMARY LISTENING   ANSWERED BY self   RELATIONSHIP SELF        Depression Screening:   :       PHQ over the last two weeks 2017   Little interest or pleasure in doing things Not at all   Feeling down, depressed or hopeless Not at all   Total Score PHQ 2 0        Fall Risk Assessment:   :       Fall Risk Assessment, last 12 mths 8/15/2017   Able to walk? Yes   Fall in past 12 months? No   Fall with injury? -   Number of falls in past 12 months -   Fall Risk Score -        Abuse Screening:   :       Abuse Screening Questionnaire 3/29/2018 3/10/2017 3/29/2016 3/12/2015   Do you ever feel afraid of your partner? N N N N   Are you in a relationship with someone who physically or mentally threatens you? N N N N   Is it safe for you to go home?  Y Y Y Y        ADL Screening:   :       ADL Assessment 3/12/2015   Feeding yourself No Help Needed   Getting from bed to chair No Help Needed   Getting dressed No Help Needed   Bathing or showering No Help Needed   Walk across the room (includes cane/walker) No Help Needed   Using the telphone No Help Needed   Taking your medications No Help Needed   Preparing meals No Help Needed   Managing money (expenses/bills) No Help Needed   Moderately strenuous housework (laundry) No Help Needed   Shopping for personal items (toiletries/medicines) No Help Needed   Shopping for groceries No Help Needed   Driving No Help Needed   Climbing a flight of stairs No Help Needed   Getting to places beyond walking distances No Help Needed

## 2018-03-30 LAB
ALBUMIN SERPL-MCNC: 4.2 G/DL (ref 3.5–4.8)
ALBUMIN/GLOB SERPL: 1.9 {RATIO} (ref 1.2–2.2)
ALP SERPL-CCNC: 63 IU/L (ref 39–117)
ALT SERPL-CCNC: 24 IU/L (ref 0–44)
AST SERPL-CCNC: 29 IU/L (ref 0–40)
BILIRUB SERPL-MCNC: 0.7 MG/DL (ref 0–1.2)
BUN SERPL-MCNC: 17 MG/DL (ref 8–27)
BUN/CREAT SERPL: 14 (ref 10–24)
CALCIUM SERPL-MCNC: 9.3 MG/DL (ref 8.6–10.2)
CHLORIDE SERPL-SCNC: 102 MMOL/L (ref 96–106)
CHOLEST SERPL-MCNC: 127 MG/DL (ref 100–199)
CO2 SERPL-SCNC: 26 MMOL/L (ref 18–29)
CREAT SERPL-MCNC: 1.22 MG/DL (ref 0.76–1.27)
GFR SERPLBLD CREATININE-BSD FMLA CKD-EPI: 56 ML/MIN/1.73
GFR SERPLBLD CREATININE-BSD FMLA CKD-EPI: 65 ML/MIN/1.73
GLOBULIN SER CALC-MCNC: 2.2 G/DL (ref 1.5–4.5)
GLUCOSE SERPL-MCNC: 111 MG/DL (ref 65–99)
HDLC SERPL-MCNC: 40 MG/DL
LDLC SERPL CALC-MCNC: 66 MG/DL (ref 0–99)
POTASSIUM SERPL-SCNC: 4.5 MMOL/L (ref 3.5–5.2)
PROT SERPL-MCNC: 6.4 G/DL (ref 6–8.5)
SODIUM SERPL-SCNC: 143 MMOL/L (ref 134–144)
TRIGL SERPL-MCNC: 104 MG/DL (ref 0–149)
VIT B12 SERPL-MCNC: 252 PG/ML (ref 232–1245)
VLDLC SERPL CALC-MCNC: 21 MG/DL (ref 5–40)

## 2018-03-30 NOTE — PROGRESS NOTES
Inform patient B!2 is borderline low. We are adding a test called MMA to see if this level is adequate or not. Notify lab that test has been added.   Kidney and liver tests are normal  Cholesterol is at goal.

## 2018-04-01 NOTE — ACP (ADVANCE CARE PLANNING)
With patient's permission advance care planning discussed. Primary SDM would be wife. Secondary SDM would be sons Marsha Vernon and Byron Ramirez. He wants no life prolonging treatment if death is imminent. He wants no life prolonging treatment if there is overwhelming, permanent neurologic injury. Reviewed paperwork. Conversation took 4 minutes.

## 2018-04-05 LAB
METHYLMALONATE SERPL-SCNC: 322 NMOL/L (ref 0–378)
SPECIMEN STATUS REPORT, ROLRST: NORMAL

## 2018-06-08 RX ORDER — ATORVASTATIN CALCIUM 80 MG/1
TABLET, FILM COATED ORAL
Qty: 30 TAB | Refills: 11 | Status: SHIPPED | OUTPATIENT
Start: 2018-06-08 | End: 2018-12-31 | Stop reason: SDUPTHER

## 2018-06-08 RX ORDER — LISINOPRIL AND HYDROCHLOROTHIAZIDE 12.5; 2 MG/1; MG/1
TABLET ORAL
Qty: 60 TAB | Refills: 11 | Status: SHIPPED | OUTPATIENT
Start: 2018-06-08 | End: 2018-09-05 | Stop reason: SDUPTHER

## 2018-07-05 ENCOUNTER — APPOINTMENT (OUTPATIENT)
Dept: GENERAL RADIOLOGY | Age: 78
End: 2018-07-05
Attending: EMERGENCY MEDICINE
Payer: MEDICARE

## 2018-07-05 ENCOUNTER — HOSPITAL ENCOUNTER (EMERGENCY)
Age: 78
Discharge: HOME | End: 2018-07-05
Attending: EMERGENCY MEDICINE
Payer: MEDICARE

## 2018-07-05 VITALS
WEIGHT: 201 LBS | DIASTOLIC BLOOD PRESSURE: 94 MMHG | SYSTOLIC BLOOD PRESSURE: 184 MMHG | HEIGHT: 68 IN | BODY MASS INDEX: 30.46 KG/M2 | RESPIRATION RATE: 21 BRPM | TEMPERATURE: 98.2 F | HEART RATE: 68 BPM | OXYGEN SATURATION: 94 %

## 2018-07-05 DIAGNOSIS — R07.89 ATYPICAL CHEST PAIN: Primary | ICD-10-CM

## 2018-07-05 LAB
ALBUMIN SERPL-MCNC: 3.6 G/DL (ref 3.5–5)
ALBUMIN/GLOB SERPL: 1.2 {RATIO} (ref 1.1–2.2)
ALP SERPL-CCNC: 71 U/L (ref 45–117)
ALT SERPL-CCNC: 30 U/L (ref 12–78)
ANION GAP SERPL CALC-SCNC: 6 MMOL/L (ref 5–15)
AST SERPL-CCNC: 29 U/L (ref 15–37)
ATRIAL RATE: 66 BPM
BASOPHILS # BLD: 0 K/UL (ref 0–0.1)
BASOPHILS NFR BLD: 0 % (ref 0–1)
BILIRUB SERPL-MCNC: 0.6 MG/DL (ref 0.2–1)
BNP SERPL-MCNC: 28 PG/ML (ref 0–450)
BUN SERPL-MCNC: 21 MG/DL (ref 6–20)
BUN/CREAT SERPL: 17 (ref 12–20)
CALCIUM SERPL-MCNC: 8.9 MG/DL (ref 8.5–10.1)
CALCULATED P AXIS, ECG09: 56 DEGREES
CALCULATED R AXIS, ECG10: -50 DEGREES
CALCULATED T AXIS, ECG11: 66 DEGREES
CHLORIDE SERPL-SCNC: 109 MMOL/L (ref 97–108)
CO2 SERPL-SCNC: 28 MMOL/L (ref 21–32)
CREAT SERPL-MCNC: 1.21 MG/DL (ref 0.7–1.3)
DIAGNOSIS, 93000: NORMAL
DIFFERENTIAL METHOD BLD: ABNORMAL
EOSINOPHIL # BLD: 0 K/UL (ref 0–0.4)
EOSINOPHIL NFR BLD: 0 % (ref 0–7)
ERYTHROCYTE [DISTWIDTH] IN BLOOD BY AUTOMATED COUNT: 12.5 % (ref 11.5–14.5)
GLOBULIN SER CALC-MCNC: 3.1 G/DL (ref 2–4)
GLUCOSE SERPL-MCNC: 111 MG/DL (ref 65–100)
HCT VFR BLD AUTO: 41.3 % (ref 36.6–50.3)
HGB BLD-MCNC: 14.1 G/DL (ref 12.1–17)
IMM GRANULOCYTES # BLD: 0 K/UL (ref 0–0.04)
IMM GRANULOCYTES NFR BLD AUTO: 0 % (ref 0–0.5)
INR PPP: 1.1 (ref 0.9–1.1)
LYMPHOCYTES # BLD: 0.5 K/UL (ref 0.8–3.5)
LYMPHOCYTES NFR BLD: 13 % (ref 12–49)
MAGNESIUM SERPL-MCNC: 2.2 MG/DL (ref 1.6–2.4)
MCH RBC QN AUTO: 29.9 PG (ref 26–34)
MCHC RBC AUTO-ENTMCNC: 34.1 G/DL (ref 30–36.5)
MCV RBC AUTO: 87.7 FL (ref 80–99)
MONOCYTES # BLD: 0.4 K/UL (ref 0–1)
MONOCYTES NFR BLD: 10 % (ref 5–13)
NEUTS SEG # BLD: 2.8 K/UL (ref 1.8–8)
NEUTS SEG NFR BLD: 77 % (ref 32–75)
NRBC # BLD: 0 K/UL (ref 0–0.01)
NRBC BLD-RTO: 0 PER 100 WBC
P-R INTERVAL, ECG05: 206 MS
PLATELET # BLD AUTO: 142 K/UL (ref 150–400)
PMV BLD AUTO: 10 FL (ref 8.9–12.9)
POTASSIUM SERPL-SCNC: 3.5 MMOL/L (ref 3.5–5.1)
PROT SERPL-MCNC: 6.7 G/DL (ref 6.4–8.2)
PROTHROMBIN TIME: 10.7 SEC (ref 9–11.1)
Q-T INTERVAL, ECG07: 414 MS
QRS DURATION, ECG06: 106 MS
QTC CALCULATION (BEZET), ECG08: 434 MS
RBC # BLD AUTO: 4.71 M/UL (ref 4.1–5.7)
RBC MORPH BLD: ABNORMAL
SODIUM SERPL-SCNC: 143 MMOL/L (ref 136–145)
TROPONIN I SERPL-MCNC: <0.05 NG/ML
VENTRICULAR RATE, ECG03: 66 BPM
WBC # BLD AUTO: 3.7 K/UL (ref 4.1–11.1)

## 2018-07-05 PROCEDURE — 83880 ASSAY OF NATRIURETIC PEPTIDE: CPT | Performed by: EMERGENCY MEDICINE

## 2018-07-05 PROCEDURE — 80053 COMPREHEN METABOLIC PANEL: CPT | Performed by: EMERGENCY MEDICINE

## 2018-07-05 PROCEDURE — 36415 COLL VENOUS BLD VENIPUNCTURE: CPT | Performed by: EMERGENCY MEDICINE

## 2018-07-05 PROCEDURE — 84484 ASSAY OF TROPONIN QUANT: CPT | Performed by: EMERGENCY MEDICINE

## 2018-07-05 PROCEDURE — 71045 X-RAY EXAM CHEST 1 VIEW: CPT

## 2018-07-05 PROCEDURE — 93005 ELECTROCARDIOGRAM TRACING: CPT

## 2018-07-05 PROCEDURE — 85610 PROTHROMBIN TIME: CPT | Performed by: EMERGENCY MEDICINE

## 2018-07-05 PROCEDURE — 83735 ASSAY OF MAGNESIUM: CPT | Performed by: EMERGENCY MEDICINE

## 2018-07-05 PROCEDURE — 74011250637 HC RX REV CODE- 250/637: Performed by: EMERGENCY MEDICINE

## 2018-07-05 PROCEDURE — 99284 EMERGENCY DEPT VISIT MOD MDM: CPT

## 2018-07-05 PROCEDURE — 85025 COMPLETE CBC W/AUTO DIFF WBC: CPT | Performed by: EMERGENCY MEDICINE

## 2018-07-05 RX ORDER — SODIUM CHLORIDE 0.9 % (FLUSH) 0.9 %
5-10 SYRINGE (ML) INJECTION EVERY 8 HOURS
Status: DISCONTINUED | OUTPATIENT
Start: 2018-07-05 | End: 2018-07-05 | Stop reason: HOSPADM

## 2018-07-05 RX ORDER — ASPIRIN 325 MG
325 TABLET ORAL ONCE
Status: COMPLETED | OUTPATIENT
Start: 2018-07-05 | End: 2018-07-05

## 2018-07-05 RX ORDER — SODIUM CHLORIDE 0.9 % (FLUSH) 0.9 %
5-10 SYRINGE (ML) INJECTION AS NEEDED
Status: DISCONTINUED | OUTPATIENT
Start: 2018-07-05 | End: 2018-07-05 | Stop reason: HOSPADM

## 2018-07-05 RX ORDER — NAPROXEN 500 MG/1
500 TABLET ORAL
Qty: 20 TAB | Refills: 0 | Status: SHIPPED | OUTPATIENT
Start: 2018-07-05 | End: 2018-07-12

## 2018-07-05 RX ADMIN — ASPIRIN 325 MG: 325 TABLET ORAL at 09:23

## 2018-07-05 NOTE — DISCHARGE INSTRUCTIONS
Chest Pain: Care Instructions  Your Care Instructions    There are many things that can cause chest pain. Some are not serious and will get better on their own in a few days. But some kinds of chest pain need more testing and treatment. Your doctor may have recommended a follow-up visit in the next 8 to 12 hours. If you are not getting better, you may need more tests or treatment. Even though your doctor has released you, you still need to watch for any problems. The doctor carefully checked you, but sometimes problems can develop later. If you have new symptoms or if your symptoms do not get better, get medical care right away. If you have worse or different chest pain or pressure that lasts more than 5 minutes or you passed out (lost consciousness), call 911 or seek other emergency help right away. A medical visit is only one step in your treatment. Even if you feel better, you still need to do what your doctor recommends, such as going to all suggested follow-up appointments and taking medicines exactly as directed. This will help you recover and help prevent future problems. How can you care for yourself at home? · Rest until you feel better. · Take your medicine exactly as prescribed. Call your doctor if you think you are having a problem with your medicine. · Do not drive after taking a prescription pain medicine. When should you call for help? Call 911 if:  ? · You passed out (lost consciousness). ? · You have severe difficulty breathing. ? · You have symptoms of a heart attack. These may include:  ¨ Chest pain or pressure, or a strange feeling in your chest.  ¨ Sweating. ¨ Shortness of breath. ¨ Nausea or vomiting. ¨ Pain, pressure, or a strange feeling in your back, neck, jaw, or upper belly or in one or both shoulders or arms. ¨ Lightheadedness or sudden weakness. ¨ A fast or irregular heartbeat.   After you call 911, the  may tell you to chew 1 adult-strength or 2 to 4 low-dose aspirin. Wait for an ambulance. Do not try to drive yourself. ?Call your doctor today if:  ? · You have any trouble breathing. ? · Your chest pain gets worse. ? · You are dizzy or lightheaded, or you feel like you may faint. ? · You are not getting better as expected. ? · You are having new or different chest pain. Where can you learn more? Go to http://bridget-primo.info/. Enter A120 in the search box to learn more about \"Chest Pain: Care Instructions. \"  Current as of: March 20, 2017  Content Version: 11.4  © 0681-9108 [employer]. Care instructions adapted under license by [employer] (which disclaims liability or warranty for this information). If you have questions about a medical condition or this instruction, always ask your healthcare professional. [place] [address] any warranty or liability for your use of this information. Musculoskeletal Chest Pain: Care Instructions  Your Care Instructions    Chest pain is not always a sign that something is wrong with your heart or that you have another serious problem. The doctor thinks your chest pain is caused by strained muscles or ligaments, inflamed chest cartilage, or another problem in your chest, rather than by your heart. You may need more tests to find the cause of your chest pain. Follow-up care is a key part of your treatment and safety. Be sure to make and go to all appointments, and call your doctor if you are having problems. It's also a good idea to know your test results and keep a list of the medicines you take. How can you care for yourself at home? · Take pain medicines exactly as directed. ¨ If the doctor gave you a prescription medicine for pain, take it as prescribed. ¨ If you are not taking a prescription pain medicine, ask your doctor if you can take an over-the-counter medicine. · Rest and protect the sore area.   · Stop, change, or take a break from any activity that may be causing your pain or soreness. · Put ice or a cold pack on the sore area for 10 to 20 minutes at a time. Try to do this every 1 to 2 hours for the next 3 days (when you are awake) or until the swelling goes down. Put a thin cloth between the ice and your skin. · After 2 or 3 days, apply a heating pad set on low or a warm cloth to the area that hurts. Some doctors suggest that you go back and forth between hot and cold. · Do not wrap or tape your ribs for support. This may cause you to take smaller breaths, which could increase your risk of lung problems. · Mentholated creams such as Bengay or Icy Hot may soothe sore muscles. Follow the instructions on the package. · Follow your doctor's instructions for exercising. · Gentle stretching and massage may help you get better faster. Stretch slowly to the point just before pain begins, and hold the stretch for at least 15 to 30 seconds. Do this 3 or 4 times a day. Stretch just after you have applied heat. · As your pain gets better, slowly return to your normal activities. Any increased pain may be a sign that you need to rest a while longer. When should you call for help? Call 911 anytime you think you may need emergency care. For example, call if:  ? · You have chest pain or pressure. This may occur with:  ¨ Sweating. ¨ Shortness of breath. ¨ Nausea or vomiting. ¨ Pain that spreads from the chest to the neck, jaw, or one or both shoulders or arms. ¨ Dizziness or lightheadedness. ¨ A fast or uneven pulse. After calling 911, chew 1 adult-strength aspirin. Wait for an ambulance. Do not try to drive yourself. ? · You have sudden chest pain and shortness of breath, or you cough up blood. ?Call your doctor now or seek immediate medical care if:  ? · You have any trouble breathing. ? · Your chest pain gets worse. ? · Your chest pain occurs consistently with exercise and is relieved by rest.   ? Watch closely for changes in your health, and be sure to contact your doctor if:  ? · Your chest pain does not get better after 1 week. Where can you learn more? Go to http://bridget-primo.info/. Enter V293 in the search box to learn more about \"Musculoskeletal Chest Pain: Care Instructions. \"  Current as of: March 20, 2017  Content Version: 11.4  © 0048-8232 [employer]. Care instructions adapted under license by [employer] (which disclaims liability or warranty for this information). If you have questions about a medical condition or this instruction, always ask your healthcare professional. [place] [address] any warranty or liability for your use of this information.

## 2018-07-05 NOTE — ED NOTES
Pt discharged by Dr. Marlene Markham. Pt provided with discharge instructions Rx and instructions on follow up care. Pt out of ED ambulatory without difficulty.

## 2018-07-05 NOTE — ED TRIAGE NOTES
Pt states that he has been having chest pain on and off. States that he has also been falling due to lower back pain from a previous surgery. States he is due to have an MRI next week.

## 2018-07-05 NOTE — ED PROVIDER NOTES
EMERGENCY DEPARTMENT HISTORY AND PHYSICAL EXAM      Date: 7/5/2018  Patient Name: Dede Lloyd Sr    History of Presenting Illness     Chief Complaint   Patient presents with    Chest Pain     x 2100 last night Pt denies any nausea/vomiting or SOB Pt with hx of CABG Pt does reports pain now behind L shoulder blade       History Provided By: Patient    HPI: Lorna Hernandes, 65 y.o. male with PMHx significant for HLD, diverticulosis, neuropathy, MI, HTN, AFib, presents ambulatory to the ED with cc of constant left sided chest pain since 2100 last night. He describes his pain as \"heaviness,\" reports it has been radiating to his left shoulder, and states it is exacerbated by driving his car. He states his pain improves at rest. Patient also reports associated nausea today. He reports hx of CABG x3. He does not smoke or drink. He specifically denies any fevers, chills, vomiting, shortness of breath, headache, rash, diarrhea, sweating or weight loss. There are no other complaints, changes, or physical findings at this time. PCP: Mary Carmen Portillo MD    Current Facility-Administered Medications   Medication Dose Route Frequency Provider Last Rate Last Dose    sodium chloride (NS) flush 5-10 mL  5-10 mL IntraVENous Q8H Mozell Lombard, MD        sodium chloride (NS) flush 5-10 mL  5-10 mL IntraVENous PRN Mozell Lombard, MD         Current Outpatient Prescriptions   Medication Sig Dispense Refill    naproxen (NAPROSYN) 500 mg tablet Take 1 Tab by mouth every twelve (12) hours as needed for Pain. 20 Tab 0    lisinopril-hydroCHLOROthiazide (PRINZIDE, ZESTORETIC) 20-12.5 mg per tablet TAKE TWO TABLETS ONCE DAILY 60 Tab 11    atorvastatin (LIPITOR) 80 mg tablet TAKE ONE TABLET ONCE DAILY 30 Tab 11    metoprolol succinate (TOPROL-XL) 100 mg tablet Take 100 mg by mouth daily.  carvedilol (COREG) 12.5 mg tablet Take 1 Tab by mouth two (2) times daily (with meals).  60 Tab 11    amLODIPine (NORVASC) 10 mg tablet Take 1 Tab by mouth daily. Indications: hypertension 30 Tab 11    DULoxetine (CYMBALTA) 60 mg capsule take 1 capsule by mouth once daily 30 Cap 5    acetaminophen (TYLENOL) 500 mg tablet Take 500 mg by mouth daily as needed for Pain.  aspirin 81 mg chewable tablet Take 1 Tab by mouth nightly. 90 Tab 3    gabapentin (NEURONTIN) 300 mg capsule Take 1 Cap by mouth three (3) times daily. 90 Cap 5    cholecalciferol (VITAMIN D3) 1,000 unit cap Take  by mouth nightly.          Past History     Past Medical History:  Past Medical History:   Diagnosis Date    [facility]     Arthritis     CAD (coronary artery disease) 12/24/2013    Cataract     Diverticulosis 9/22/2009    Dyslipidemia 9/22/2009    Hemorrhoids 9/22/2009    HTN 9/22/2009    Hypercholesterolemia     Impotence 9/22/2009    [facility] infarction[facility] 12/2013    Nausea & vomiting     Neuropathy 9/22/2009    Open angle glaucoma with borderline intraocular pressure     Vitamin D deficiency 9/22/2009      Past Surgical History:  Past Surgical History:   Procedure Laterality Date    ENDOSCOPY, COLON, DIAGNOSTIC  775588    HX CATARACT REMOVAL Right 09/13/2017    HX CORONARY ARTERY BYPASS GRAFT  12/2013    triple    HX HERNIA REPAIR Bilateral     HX LITHOTRIPSY  [id number]    HX ORTHOPAEDIC      back surgery     Family History:  Family History   Problem Relation Age of Onset    Hypertension Mother     Diabetes Mother     Cancer Father      LIVER    Alcohol abuse Brother     Diabetes Sister     Hypertension Sister     Arthritis-osteo Sister     Kidney Disease Sister      DIALYSIS    Alcohol abuse Brother     Suicide Brother     No Known Problems Brother     No Known Problems Brother     No Known Problems Brother     Arthritis-osteo Brother     Hypertension Son     Hypertension Son     Anesth Problems Neg Hx      Social History:  Social History   Substance Use Topics    Smoking status: Never Smoker    Smokeless tobacco: Never Used    Alcohol use No     Allergies: Allergies   Allergen Reactions    Percocet [Oxycodone-Acetaminophen] Other (comments)     \"feel weird\"     Review of Systems   Review of Systems   Constitutional: Negative. Negative for activity change, appetite change, chills, fatigue, fever and unexpected weight change. HENT: Negative. Negative for congestion, hearing loss, rhinorrhea, sneezing and voice change. Eyes: Negative. Negative for pain and visual disturbance. Respiratory: Negative. Negative for apnea, cough, choking, chest tightness and shortness of breath. Cardiovascular: Positive for chest pain (left). Negative for palpitations. Gastrointestinal: Positive for nausea. Negative for abdominal distention, abdominal pain, blood in stool, diarrhea and vomiting. Genitourinary: Negative. Negative for difficulty urinating, flank pain, frequency and urgency. No discharge   Musculoskeletal: Negative. Negative for arthralgias, back pain, myalgias and neck stiffness. Skin: Negative. Negative for color change and rash. Neurological: Negative. Negative for dizziness, seizures, syncope, speech difficulty, weakness, numbness and headaches. Hematological: Negative for adenopathy. Psychiatric/Behavioral: Negative. Negative for agitation, behavioral problems, dysphoric mood and suicidal ideas. The patient is not nervous/anxious. Physical Exam   Physical Exam   Constitutional: He is oriented to person, place, and time. He appears well-developed and well-nourished. No distress. HENT:   Head: Normocephalic and atraumatic. Mouth/Throat: Oropharynx is clear and moist. No oropharyngeal exudate. Eyes: Conjunctivae and EOM are normal. Pupils are equal, round, and reactive to light. Right eye exhibits no discharge. Left eye exhibits no discharge. Neck: Normal range of motion. Neck supple. Cardiovascular: Normal rate, regular rhythm and intact distal pulses.   Exam reveals no gallop and no friction rub. No murmur heard. Pulmonary/Chest: Effort normal and breath sounds normal. No respiratory distress. He has no wheezes. He has no rales. Pain reproducible with provocative maneuvers   Abdominal: Soft. Bowel sounds are normal. He exhibits no distension and no mass. There is no tenderness. There is no rebound and no guarding. Musculoskeletal: Normal range of motion. He exhibits no edema. Lymphadenopathy:     He has no cervical adenopathy. Neurological: He is alert and oriented to person, place, and time. No cranial nerve deficit. Coordination normal.   Skin: Skin is warm and dry. No rash noted. No erythema. Psychiatric: He has a normal mood and affect. Nursing note and vitals reviewed. Diagnostic Study Results     Labs -     Recent Results (from the past 12 hour(s))   CBC WITH AUTOMATED DIFF    Collection Time: 07/05/18  9:14 AM   Result Value Ref Range    WBC 3.7 (L) 4.1 - 11.1 K/uL    RBC 4.71 4.10 - 5.70 M/uL    HGB 14.1 12.1 - 17.0 g/dL    HCT 41.3 36.6 - 50.3 %    MCV 87.7 80.0 - 99.0 FL    MCH 29.9 26.0 - 34.0 PG    MCHC 34.1 30.0 - 36.5 g/dL    RDW 12.5 11.5 - 14.5 %    PLATELET 517 (L) 578 - 400 K/uL    MPV 10.0 8.9 - 12.9 FL    NRBC 0.0 0  WBC    ABSOLUTE NRBC 0.00 0.00 - 0.01 K/uL    NEUTROPHILS 77 (H) 32 - 75 %    LYMPHOCYTES 13 12 - 49 %    MONOCYTES 10 5 - 13 %    EOSINOPHILS 0 0 - 7 %    BASOPHILS 0 0 - 1 %    IMMATURE GRANULOCYTES 0 0.0 - 0.5 %    ABS. NEUTROPHILS 2.8 1.8 - 8.0 K/UL    ABS. LYMPHOCYTES 0.5 (L) 0.8 - 3.5 K/UL    ABS. MONOCYTES 0.4 0.0 - 1.0 K/UL    ABS. EOSINOPHILS 0.0 0.0 - 0.4 K/UL    ABS. BASOPHILS 0.0 0.0 - 0.1 K/UL    ABS. IMM.  GRANS. 0.0 0.00 - 0.04 K/UL    DF AUTOMATED      RBC COMMENTS NORMOCYTIC, NORMOCHROMIC     METABOLIC PANEL, COMPREHENSIVE    Collection Time: 07/05/18  9:14 AM   Result Value Ref Range    Sodium 143 136 - 145 mmol/L    Potassium 3.5 3.5 - 5.1 mmol/L    Chloride 109 (H) 97 - 108 mmol/L    CO2 28 21 - 32 mmol/L    Anion gap 6 5 - 15 mmol/L    Glucose 111 (H) 65 - 100 mg/dL    BUN 21 (H) 6 - 20 MG/DL    Creatinine 1.21 0.70 - 1.30 MG/DL    BUN/Creatinine ratio 17 12 - 20      GFR est AA >60 >60 ml/min/1.73m2    GFR est non-AA 58 (L) >60 ml/min/1.73m2    Calcium 8.9 8.5 - 10.1 MG/DL    Bilirubin, total 0.6 0.2 - 1.0 MG/DL    ALT (SGPT) 30 12 - 78 U/L    AST (SGOT) 29 15 - 37 U/L    Alk. phosphatase 71 45 - 117 U/L    Protein, total 6.7 6.4 - 8.2 g/dL    Albumin 3.6 3.5 - 5.0 g/dL    Globulin 3.1 2.0 - 4.0 g/dL    A-G Ratio 1.2 1.1 - 2.2     NT-PRO BNP    Collection Time: 07/05/18  9:14 AM   Result Value Ref Range    NT pro-BNP 28 0 - 450 PG/ML   TROPONIN I    Collection Time: 07/05/18  9:14 AM   Result Value Ref Range    Troponin-I, Qt. <0.05 <0.05 ng/mL   PROTHROMBIN TIME + INR    Collection Time: 07/05/18  9:14 AM   Result Value Ref Range    INR 1.1 0.9 - 1.1      Prothrombin time 10.7 9.0 - 11.1 sec   MAGNESIUM    Collection Time: 07/05/18  9:14 AM   Result Value Ref Range    Magnesium 2.2 1.6 - 2.4 mg/dL       Radiologic Studies -   CXR Results  (Last 48 hours)               07/05/18 0901  XR CHEST PORT Final result    Impression:  IMPRESSION: No acute abnormality               Narrative:  EXAM:  XR CHEST PORT       INDICATION:  Chest Pain       COMPARISON:  2010       FINDINGS: A portable AP radiograph of the chest was obtained at 854 hours. The   patient is on a cardiac monitor. The lungs are clear. The cardiac and   mediastinal contours and pulmonary vascularity are normal.  Patient is status   post median sternotomy. Medical Decision Making   I am the first provider for this patient. I reviewed the vital signs, available nursing notes, past medical history, past surgical history, family history and social history. Vital Signs-Reviewed the patient's vital signs.   Patient Vitals for the past 12 hrs:   Temp Pulse Resp BP SpO2   07/05/18 0849 - - - - 97 %   07/05/18 0843 98.2 °F (36.8 °C) 70 17 (!) 160/103 97 %   07/05/18 0838 - 70 22 (!) 160/103 97 %       Pulse Oximetry Analysis - 97% on room air    Cardiac Monitor:   Rate: 70 bpm  Rhythm: Normal Sinus Rhythm      EKG interpretation: 08:35  Rhythm: normal sinus rhythm; and regular . Rate (approx.): 66; Axis: normal; ND interval: normal; QRS interval: normal ; ST/T wave: normal; Other findings: normal.    Records Reviewed: Nursing Notes, Old Medical Records, Previous electrocardiograms, Previous Radiology Studies and Previous Laboratory Studies    Provider Notes (Medical Decision Making):     DDx: Acute coronary syndrome, costochondritis, arrhythmia, MSK    ED Course:   Initial assessment performed. The patients presenting problems have been discussed, and they are in agreement with the care plan formulated and outlined with them. I have encouraged them to ask questions as they arise throughout their visit. 9:57 AM  The patient states that their symptoms have resolved and they feel much better. There are no other new complaints at this time. His questions have been answered. We are awaiting final results and those will be reviewed with them when they become available. Critical Care Time:   0 minutes    Disposition:  9:58 AM  Sendy Ariadne Bartlett's  results have been reviewed with him. He has been counseled regarding his diagnosis. He verbally conveys understanding and agreement of the signs, symptoms, diagnosis, treatment and prognosis and additionally agrees to follow up as recommended with Dr. Sherrill Nichols MD in 24 - 48 hours. He also agrees with the care-plan and conveys that all of his questions have been answered.   I have also put together some discharge instructions for him that include: 1) educational information regarding their diagnosis, 2) how to care for their diagnosis at home, as well a 3) list of reasons why they would want to return to the ED prior to their follow-up appointment, should their condition change. PLAN:  1. Current Discharge Medication List      START taking these medications    Details   naproxen (NAPROSYN) 500 mg tablet Take 1 Tab by mouth every twelve (12) hours as needed for Pain. Qty: 20 Tab, Refills: 0           2. Follow-up Information     Follow up With Details Comments Contact Info    Yoan Cortez MD Call in 2 days As needed, If symptoms worsen [address]  [phone]          Return to ED if worse     Diagnosis     Clinical Impression:   1. Atypical chest pain        Attestations: This note is prepared by Melanie Aguayo, acting as Scribe for [employer] Nadia Lee, [address] [facility] Nadia Lee MD: The scribe's documentation has been prepared under my direction and personally reviewed by me in its entirety. I confirm that the note above accurately reflects all work, treatment, procedures, and medical decision making performed by me.

## 2018-07-05 NOTE — PROGRESS NOTES
Pharmacy Clarification of Prior to Admission Medication Regimen-Follow Up Needed    The patient was able to participate in interview regarding clarification of the prior to admission medication regimen. Patient stated he did not know what medications he takes daily, that his wife manages his medications. [facility] called the patient's wife, Brett Garcia, [phone], and left a voice mail. Patient was discharged prior to wife returning our call. The medication history will need to be re-evaluated at a later time during admission when patient is willing/able to participate or if more information is provided.     Thank you,  Omero [facility]  Medication History Pharmacy Technician

## 2018-07-11 ENCOUNTER — TELEPHONE (OUTPATIENT)
Dept: INTERNAL MEDICINE CLINIC | Facility: CLINIC | Age: 78
End: 2018-07-11

## 2018-07-11 NOTE — TELEPHONE ENCOUNTER
Pt called and stated that he has a rash on his back side that is spreading and he is very concerned. He would like to get in to see Dr Raoul Oviedo as soon as possible. He believes he is taking too many medications. Pt can be reached at [phone].

## 2018-07-12 ENCOUNTER — OFFICE VISIT (OUTPATIENT)
Dept: INTERNAL MEDICINE CLINIC | Facility: CLINIC | Age: 78
End: 2018-07-12

## 2018-07-12 VITALS
TEMPERATURE: 98.6 F | WEIGHT: 195 LBS | BODY MASS INDEX: 29.55 KG/M2 | RESPIRATION RATE: 20 BRPM | HEART RATE: 74 BPM | DIASTOLIC BLOOD PRESSURE: 66 MMHG | OXYGEN SATURATION: 98 % | HEIGHT: 68 IN | SYSTOLIC BLOOD PRESSURE: 109 MMHG

## 2018-07-12 DIAGNOSIS — L73.9 ACUTE FOLLICULITIS: Primary | ICD-10-CM

## 2018-07-12 DIAGNOSIS — Z13.31 SCREENING FOR DEPRESSION: ICD-10-CM

## 2018-07-12 RX ORDER — AMOXICILLIN AND CLAVULANATE POTASSIUM 875; 125 MG/1; MG/1
1 TABLET, FILM COATED ORAL EVERY 12 HOURS
Qty: 14 TAB | Refills: 0 | Status: SHIPPED | OUTPATIENT
Start: 2018-07-12 | End: 2018-08-13 | Stop reason: ALTCHOICE

## 2018-07-12 NOTE — PROGRESS NOTES
HISTORY OF PRESENT ILLNESS Ivania Rodarte is a 65 y.o. male. HPI  He presetnts with a 3 day history of rash on left upper back that itches. It is uncomfortable, but not frankly painful. He has been using a back scratcher and it seems to be spreading. He denies fever or chills. He has not attempted any treatment. PHQ over the last two weeks 7/12/2018 Little interest or pleasure in doing things Not at all Feeling down, depressed or hopeless Not at all Total Score PHQ 2 0 Patient Active Problem List  
Diagnosis Code  Hyperlipidemia, mixed E78.2  Impotence N52.9  Diverticulosis K57.90  
 Hemorrhoids K64.9  Vitamin D deficiency E55.9  Benign hypertension with chronic kidney disease, stage III I12.9, N18.3  GERD (gastroesophageal reflux disease) K21.9  Intermittent angle-closure glaucoma H40.239  Coronary artery disease involving native coronary artery of native heart without angina pectoris I25.10  Nephrolithiasis N20.0  Left median nerve neuropathy G56.12  
 CKD (chronic kidney disease) stage 3, GFR 30-59 ml/min N18.3  Advance directive discussed with patient Z70.80  Lumbar stenosis with neurogenic claudication M48.062  
 Unequal blood pressure in upper extremities R09.89  Glucose intolerance (impaired glucose tolerance) R73.02  
 Combined forms of age-related cataract of right eye H25.811  
 Combined forms of age-related cataract of left eye H25.812 Past Medical History:  
Diagnosis Date  A-fib ([facility] [address]  Arthritis  CAD (coronary artery disease) 12/24/2013  Cataract  Diverticulosis 9/22/2009  Dyslipidemia 9/22/2009  Hemorrhoids 9/22/2009  
 HTN 9/22/2009  Hypercholesterolemia  Impotence 9/22/2009  MI (myocardial infarction) ([facility] [address] 12/2013  Nausea & vomiting  Neuropathy 9/22/2009  Open angle glaucoma with borderline intraocular pressure  Vitamin D deficiency 9/22/2009 Past Surgical History:  
Procedure Laterality Date  ENDOSCOPY, COLON, DIAGNOSTIC  [id number]  HX CATARACT REMOVAL Right 09/13/2017  HX CORONARY ARTERY BYPASS GRAFT  12/2013  
 triple  HX HERNIA REPAIR Bilateral   
 HX LITHOTRIPSY  [id number]  HX ORTHOPAEDIC    
 back surgery Social History Social History  Marital status:  Spouse name: N/A  
 Number of children: N/A  
 Years of education: N/A Social History Main Topics  Smoking status: Never Smoker  Smokeless tobacco: Never Used  Alcohol use No  
 Drug use: No  
 Sexual activity: Yes  
  Partners: Female Other Topics Concern  Not on file Social History Narrative Family History Problem Relation Age of Onset  Hypertension Mother  Diabetes Mother  Cancer Father LIVER  Alcohol abuse Brother  Diabetes Sister  Hypertension Sister  Arthritis-osteo Sister  Kidney Disease Sister DIALYSIS  
 Alcohol abuse Brother  Suicide Brother  No Known Problems Brother  No Known Problems Brother  No Known Problems Brother  Arthritis-osteo Brother  Hypertension Son   
[employer] Hypertension Son  Anesth Problems Neg Hx Allergies Allergen Reactions  Percocet [Oxycodone-Acetaminophen] Other (comments) \"feel weird\" Current Outpatient Prescriptions Medication Sig Dispense Refill  naproxen (NAPROSYN) 500 mg tablet Take 1 Tab by mouth every twelve (12) hours as needed for Pain. 20 Tab 0  
 lisinopril-hydroCHLOROthiazide (PRINZIDE, ZESTORETIC) 20-12.5 mg per tablet TAKE TWO TABLETS ONCE DAILY 60 Tab 11  
 atorvastatin (LIPITOR) 80 mg tablet TAKE ONE TABLET ONCE DAILY 30 Tab 11  
 metoprolol succinate (TOPROL-XL) 100 mg tablet Take 100 mg by mouth daily.  carvedilol (COREG) 12.5 mg tablet Take 1 Tab by mouth two (2) times daily (with meals). 60 Tab 11  
 amLODIPine (NORVASC) 10 mg tablet Take 1 Tab by mouth daily.  Indications: hypertension 30 Tab 11  
 DULoxetine (CYMBALTA) 60 mg capsule take 1 capsule by mouth once daily 30 Cap 5  
 acetaminophen (TYLENOL) 500 mg tablet Take 500 mg by mouth daily as needed for Pain.  aspirin 81 mg chewable tablet Take 1 Tab by mouth nightly. 90 Tab 3  cholecalciferol (VITAMIN D3) 1,000 unit cap Take  by mouth nightly. ROS Visit Vitals  /66 (BP 1 Location: Left arm, BP Patient Position: Sitting)  Pulse 74  Temp 98.6 °F (37 °C) (Oral)  Resp 20  
 Ht 5' 8\" (1.727 m)  Wt 195 lb (88.5 kg)  SpO2 98%  BMI 29.65 kg/m2 Physical Exam  
Constitutional: He is oriented to person, place, and time. He appears well-developed and well-nourished. HENT:  
Head: Normocephalic and atraumatic. Eyes: Conjunctivae are normal. Pupils are equal, round, and reactive to light. Neck: Neck supple. Carotid bruit is not present. Cardiovascular: Normal rate, regular rhythm, S1 normal, S2 normal and normal heart sounds. Exam reveals no gallop. No murmur heard. Pulmonary/Chest: Effort normal and breath sounds normal. He has no wheezes. He has no rhonchi. He has no rales. Musculoskeletal: He exhibits no edema. Neurological: He is alert and oriented to person, place, and time. Skin: Skin is warm, dry and intact. Rash noted. Psychiatric: He has a normal mood and affect. His behavior is normal.  
Nursing note and vitals reviewed. ASSESSMENT and PLAN 
  ICD-10-CM ICD-9-CM 1. Acute folliculitis V63.1 780.7 amoxicillin-clavulanate (AUGMENTIN) 875-125 mg per tablet 2. Screening for depression Z13.89 V79.0 [id number] [employer] Diagnoses and all orders for this visit: 
 
1. Acute folliculitis Most likely streptococcal, but if not improving in 2-3 days, add therapy for staphylococcus. -     amoxicillin-clavulanate (AUGMENTIN) 875-125 mg per tablet; Take 1 Tab by mouth every twelve (12) hours. 2. Screening for depression--Negative -     [id number] [employer] Follow-up Disposition: 
Return if symptoms worsen or fail to improve. I have discussed the diagnosis, evaluation and treatment options and the intended plan with the patient. Patient is in agreement. The patient has received an after-visit summary and questions were answered concerning future plans. I have discussed side effects and warnings of any new medications with the patient as well.

## 2018-07-12 NOTE — PATIENT INSTRUCTIONS
Augmentin 875/125 mg twice a day with food for 7 days Use ice pack or cold compress for itching; scratching only spreads it. Folliculitis: Care Instructions Your Care Instructions Folliculitis (say \"dgn-UOQ-auf-BRIA-willow\") is an infection of the pouches (follicles) in the skin where hair grows. It can occur on any part of the body, but it is most common on the scalp, face, armpits, and groin. Bacteria, such as those found in a hot tub, can cause folliculitis. Folliculitis begins as a red, tender area near a strand of hair. The skin can itch or burn and may drain pus or blood. Sometimes folliculitis can lead to more serious skin infections. Your doctor usually can treat mild folliculitis with an antibiotic cream or ointment. If you have folliculitis on your scalp, you may use a shampoo that kills bacteria. Antibiotics you take as pills can treat infections deeper in the skin. For stubborn cases of folliculitis, laser treatment may be an option. Laser treatment uses strong beams of light to destroy the hair follicle. But hair will no longer grow in the treated area. Follow-up care is a key part of your treatment and safety. Be sure to make and go to all appointments, and call your doctor if you are having problems. It's also a good idea to know your test results and keep a list of the medicines you take. How can you care for yourself at home? · Take your medicine exactly as prescribed. If your doctor prescribed antibiotics, take them as directed. Do not stop taking them just because you feel better. You need to take the full course of antibiotics. · Use a soap that kills bacteria to wash the infected area. If your scalp or beard is infected, use a shampoo with selenium or propylene glycol. Be careful. Do not scrub too long or too hard. · Mix 1 1/3 cup warm water and 1 tablespoon vinegar.  Soak a cloth in the mixture, and place it over the infected skin until it cools off (usually 5 to 10 minutes). You can do this 3 to 6 times a day. · Do not share your razor, towel, or washcloth. That can spread folliculitis. · Use a new blade in your razor each time you shave to keep from re-infecting your skin. · If you tend to get folliculitis, avoid using hot tubs. They can contain bacteria that cause folliculitis. When should you call for help? Call your doctor now or seek immediate medical care if: 
  · You have symptoms of infection, such as: 
¨ Increased pain, swelling, warmth, or redness. ¨ Red streaks leading from the area. ¨ Pus draining from the area. ¨ A fever.  
 Watch closely for changes in your health, and be sure to contact your doctor if: 
  · You do not get better as expected. Where can you learn more? Go to http://bridget-primo.info/. Enter M257 in the search box to learn more about \"Folliculitis: Care Instructions. \" Current as of: October 5, 2017 Content Version: 11.7 © 5832-9776 [employer]. Care instructions adapted under license by [employer] (which disclaims liability or warranty for this information). If you have questions about a medical condition or this instruction, always ask your healthcare professional. [address] any warranty or liability for your use of this information.

## 2018-07-12 NOTE — PROGRESS NOTES
Nikita Thakur 73  Identified pt with two pt identifiers(name and ). Chief Complaint   Patient presents with    Rash       Reviewed record In preparation for visit and have obtained necessary documentation. Has info on advanced directive but has not filled them out. 1. Have you been to the ER, urgent care clinic or hospitalized since your last visit? [facility] ER 18    2. Have you seen or consulted any other health care providers outside of the [address] [place] [address] since your last visit? Include any pap smears or colon screening. No    Vitals reviewed with provider.     Health Maintenance reviewed:     Health Maintenance Due   Topic    DTaP/Tdap/Td series (1 - Tdap)    ZOSTER VACCINE AGE 60>           Wt Readings from Last 3 Encounters:   18 195 lb (88.5 kg)   18 201 lb (91.2 kg)   18 210 lb (95.3 kg)        Temp Readings from Last 3 Encounters:   18 98.6 °F (37 °C) (Oral)   18 98.2 °F (36.8 °C)   18 98 °F (36.7 °C) (Oral)        BP Readings from Last 3 Encounters:   18 109/66   18 (!) 184/94   18 134/77        Pulse Readings from Last 3 Encounters:   18 74   18 68   18 (!) 56        Vitals:    18 0831   BP: 109/66   Pulse: 74   Resp: 20   Temp: 98.6 °F (37 °C)   TempSrc: Oral   SpO2: 98%   Weight: 195 lb (88.5 kg)   Height: 5' 8\" (1.727 m)   PainSc:   0 - No pain          Learning Assessment:   :       Learning Assessment 10/14/2014   PRIMARY LEARNER Patient   HIGHEST LEVEL OF EDUCATION - PRIMARY LEARNER  GRADUATED HIGH SCHOOL OR GED   PRIMARY LANGUAGE ENGLISH   LEARNER PREFERENCE PRIMARY LISTENING   ANSWERED BY self   RELATIONSHIP SELF        Depression Screening:   :       PHQ over the last two weeks 2018   Little interest or pleasure in doing things Not at all   Feeling down, depressed or hopeless Not at all   Total Score PHQ 2 0        Fall Risk Assessment:   :       Fall Risk Assessment, last 12 mths 8/15/2017 Able to walk? Yes   Fall in past 12 months? No   Fall with injury? -   Number of falls in past 12 months -   Fall Risk Score -        Abuse Screening:   :       Abuse Screening Questionnaire 3/29/2018 3/10/2017 3/29/2016 3/12/2015   Do you ever feel afraid of your partner? N N N N   Are you in a relationship with someone who physically or mentally threatens you? N N N N   Is it safe for you to go home?  Y Y Y Y        ADL Screening:   :       ADL Assessment 3/12/2015   Feeding yourself No Help Needed   Getting from bed to chair No Help Needed   Getting dressed No Help Needed   Bathing or showering No Help Needed   Walk across the room (includes cane/walker) No Help Needed   Using the telphone No Help Needed   Taking your medications No Help Needed   Preparing meals No Help Needed   Managing money (expenses/bills) No Help Needed   Moderately strenuous housework (laundry) No Help Needed   Shopping for personal items (toiletries/medicines) No Help Needed   Shopping for groceries No Help Needed   Driving No Help Needed   Climbing a flight of stairs No Help Needed   Getting to places beyond walking distances No Help Needed

## 2018-07-12 NOTE — MR AVS SNAPSHOT
[address] [address] [phone] Patient: Alistair Dunlap 
MRN: [id number] VERN:[birth year]4 Visit Information Date & Time Provider Department Dept. Phone Encounter #  
 7/12/2018  8:15 AM Mohan Hernandez MD [facility] Internal Medicine of [address] [id number] Follow-up Instructions Return if symptoms worsen or fail to improve. Your Appointments 9/24/2018  8:45 AM  
LAB with LAB [facility] Internal Medicine [facility]) Appt Note: Non-Fasting Labs [facility]) Erwin Nicolas [phone]  
  
   
 Erwin Nicolas  
  
    
 10/1/2018  8:45 AM  
ROUTINE CARE with Mohan Hernandez MD  
[facility] Internal Medicine of [facility] Appt Note: 6 months (around 9/29/2018) for HTN, chol, gluc,  non-fasting lab work a week before visit Erwin Nicolas [phone]  
  
   
 [address] [facility] Upcoming Health Maintenance Date Due DTaP/Tdap/Td series (1 - Tdap) 9/6/1960 ZOSTER VACCINE AGE 60> 7/6/1999 Influenza Age 5 to Adult 8/1/2018 MEDICARE YEARLY EXAM 3/30/2019 GLAUCOMA SCREENING Q2Y 7/25/2019 Allergies as of 7/12/2018  Review Complete On: 7/12/2018 By: Mohan Hernandez MD  
  
 Severity Noted Reaction Type Reactions Percocet [Oxycodone-acetaminophen]  09/22/2009    Other (comments) \"feel weird\" Current Immunizations  Reviewed on 7/12/2018 Name Date Influenza High Dose Vaccine PF 9/29/2017, 9/14/2015 Influenza Vaccine (Quad) PF 9/9/2016 Pneumococcal Conjugate (PCV-13) 3/12/2015 ZZZ-RETIRED (DO NOT USE) Pneumococcal Vaccine (Unspecified Type) 4/22/2010 Reviewed by Zoila Salcido LPN on 2/26/8469 at  8:23 AM  
You Were Diagnosed With   
  
 Codes Comments Acute folliculitis    -  Primary ICD-10-CM: L73.9 ICD-9-CM: 704.8 Screening for depression     ICD-10-CM: Z13.89 ICD-9-CM: V79.0 Vitals BP Pulse Temp Resp Height(growth percentile) Weight(growth percentile) 109/66 (BP 1 Location: Left arm, BP Patient Position: Sitting) 74 98.6 °F (37 °C) (Oral) 20 5' 8\" (1.727 m) 195 lb (88.5 kg) SpO2 BMI Smoking Status 98% 29.65 kg/m2 Never Smoker BMI and BSA Data Body Mass Index Body Surface Area  
 29.65 kg/m 2 2.06 m 2 Preferred Pharmacy Pharmacy Name Phone [facility] [place] [place] [phone] Your Updated Medication List  
  
   
This list is accurate as of 7/12/18  8:50 AM.  Always use your most recent med list.  
  
  
  
  
 acetaminophen 500 mg tablet Commonly known as:  TYLENOL Take 500 mg by mouth daily as needed for Pain. amLODIPine 10 mg tablet Commonly known as:  Zachariah Presser Take 1 Tab by mouth daily. Indications: hypertension  
  
 amoxicillin-clavulanate 875-125 mg per tablet Commonly known as:  AUGMENTIN Take 1 Tab by mouth every twelve (12) hours. aspirin 81 mg chewable tablet Take 1 Tab by mouth nightly. atorvastatin 80 mg tablet Commonly known as:  LIPITOR  
TAKE ONE TABLET ONCE DAILY  
  
 carvedilol 12.5 mg tablet Commonly known as:  Janay Buster Take 1 Tab by mouth two (2) times daily (with meals). cholecalciferol 1,000 unit Cap Commonly known as:  VITAMIN D3 Take  by mouth nightly. DULoxetine 60 mg capsule Commonly known as:  CYMBALTA  
take 1 capsule by mouth once daily  
  
 lisinopril-hydroCHLOROthiazide 20-12.5 mg per tablet Commonly known as:  PRINZIDE, ZESTORETIC  
TAKE TWO TABLETS ONCE DAILY  
  
 metoprolol succinate 100 mg tablet Commonly known as:  TOPROL-XL Take 100 mg by mouth daily. Prescriptions Sent to Pharmacy Refills amoxicillin-clavulanate (AUGMENTIN) 875-125 mg per tablet 0 Sig: Take 1 Tab by mouth every twelve (12) hours. Class: Normal  
 Pharmacy: [address] [place] [address], [facility] Rene  #: [phone] Route: Oral  
  
We Performed the Following [id number] [employer] [ [facility]] Follow-up Instructions Return if symptoms worsen or fail to improve. Patient Instructions Augmentin 875/125 mg twice a day with food for 7 days Use ice pack or cold compress for itching; scratching only spreads it. Folliculitis: Care Instructions Your Care Instructions Folliculitis (say \"nss-IYR-ecb-LY-tus\") is an infection of the pouches (follicles) in the skin where hair grows. It can occur on any part of the body, but it is most common on the scalp, face, armpits, and groin. Bacteria, such as those found in a hot tub, can cause folliculitis. Folliculitis begins as a red, tender area near a strand of hair. The skin can itch or burn and may drain pus or blood. Sometimes folliculitis can lead to more serious skin infections. Your doctor usually can treat mild folliculitis with an antibiotic cream or ointment. If you have folliculitis on your scalp, you may use a shampoo that kills bacteria. Antibiotics you take as pills can treat infections deeper in the skin. For stubborn cases of folliculitis, laser treatment may be an option. Laser treatment uses strong beams of light to destroy the hair follicle. But hair will no longer grow in the treated area. Follow-up care is a key part of your treatment and safety. Be sure to make and go to all appointments, and call your doctor if you are having problems. It's also a good idea to know your test results and keep a list of the medicines you take. How can you care for yourself at home? · Take your medicine exactly as prescribed.  If your doctor prescribed antibiotics, take them as directed. Do not stop taking them just because you feel better. You need to take the full course of antibiotics. · Use a soap that kills bacteria to wash the infected area. If your scalp or beard is infected, use a shampoo with selenium or propylene glycol. Be careful. Do not scrub too long or too hard. · Mix 1 1/3 cup warm water and 1 tablespoon vinegar. Soak a cloth in the mixture, and place it over the infected skin until it cools off (usually 5 to 10 minutes). You can do this 3 to 6 times a day. · Do not share your razor, towel, or washcloth. That can spread folliculitis. · Use a new blade in your razor each time you shave to keep from re-infecting your skin. · If you tend to get folliculitis, avoid using hot tubs. They can contain bacteria that cause folliculitis. When should you call for help? Call your doctor now or seek immediate medical care if: 
  · You have symptoms of infection, such as: 
¨ Increased pain, swelling, warmth, or redness. ¨ Red streaks leading from the area. ¨ Pus draining from the area. ¨ A fever.  
 Watch closely for changes in your health, and be sure to contact your doctor if: 
  · You do not get better as expected. Where can you learn more? Go to http://bridget-primo.info/. Enter M257 in the search box to learn more about \"Folliculitis: Care Instructions. \" Current as of: October 5, 2017 Content Version: 11.7 © 9343-5300 DigitalMR. Care instructions adapted under license by [employer] (which disclaims liability or warranty for this information). If you have questions about a medical condition or this instruction, always ask your healthcare professional. [address] [address] any warranty or liability for your use of this information. Introducing [facility]!    
 Robles Murry introduces [employer] patient portal. Now you can access parts of your medical record, email your doctor's office, and request medication refills online. 1. In your internet browser, go to [email or web address] 2. Click on the First Time User? Click Here link in the Sign In box. You will see the New Member Sign Up page. 3. Enter your AwoX Access Code exactly as it appears below. You will not need to use this code after youve completed the sign-up process. If you do not sign up before the expiration date, you must request a new code. · [employer] Access Code: [email or web address] Expires: 9/30/2018 11:21 AM 
 
4. Enter the last four digits of your Social Security Number (xxxx) and Date of Birth (mm/dd/yyyy) as indicated and click Submit. You will be taken to the next sign-up page. 5. Create a AwoX ID. This will be your AwoX login ID and cannot be changed, so think of one that is secure and easy to remember. 6. Create a A[employer]X password. You can change your password at any time. 7. Enter your Password Reset Question and Answer. This can be used at a later time if you forget your password. 8. Enter your e-mail address. You will receive e-mail notification when new information is available in [address] 9. Click Sign Up. You can now view and download portions of your medical record. 10. Click the Download Summary menu link to download a portable copy of your medical information. If you have questions, please visit the Frequently Asked Questions section of the AwoX website. Remember, [employer]X is NOT to be used for urgent needs. For medical emergencies, dial 911. Now available from your iPhone and Android! Please provide this summary of care documentation to your next provider. Your primary care clinician is listed as Carlyn Harris. If you have any questions after today's visit, please call [phone].

## 2018-08-13 ENCOUNTER — OFFICE VISIT (OUTPATIENT)
Dept: INTERNAL MEDICINE CLINIC | Facility: CLINIC | Age: 78
End: 2018-08-13

## 2018-08-13 VITALS
RESPIRATION RATE: 18 BRPM | DIASTOLIC BLOOD PRESSURE: 75 MMHG | SYSTOLIC BLOOD PRESSURE: 130 MMHG | HEIGHT: 68 IN | TEMPERATURE: 98.7 F | BODY MASS INDEX: 30.77 KG/M2 | WEIGHT: 203 LBS | HEART RATE: 72 BPM | OXYGEN SATURATION: 94 %

## 2018-08-13 DIAGNOSIS — R73.02 GLUCOSE INTOLERANCE (IMPAIRED GLUCOSE TOLERANCE): ICD-10-CM

## 2018-08-13 DIAGNOSIS — M48.062 LUMBAR STENOSIS WITH NEUROGENIC CLAUDICATION: ICD-10-CM

## 2018-08-13 DIAGNOSIS — Z01.818 PRE-OP EVALUATION: Primary | ICD-10-CM

## 2018-08-13 DIAGNOSIS — N18.30 BENIGN HYPERTENSION WITH CHRONIC KIDNEY DISEASE, STAGE III (HCC): ICD-10-CM

## 2018-08-13 DIAGNOSIS — I12.9 BENIGN HYPERTENSION WITH CHRONIC KIDNEY DISEASE, STAGE III (HCC): ICD-10-CM

## 2018-08-13 DIAGNOSIS — I25.10 CORONARY ARTERY DISEASE INVOLVING NATIVE CORONARY ARTERY OF NATIVE HEART WITHOUT ANGINA PECTORIS: ICD-10-CM

## 2018-08-13 NOTE — PROGRESS NOTES
Preoperative Evaluation    Date of Exam: 2018     Visit Vitals    /75    Pulse 72    Temp 98.7 °F (37.1 °C) (Oral)    Resp 18    Ht 5' 8\" (1.727 m)    Wt 203 lb (92.1 kg)    SpO2 94%    BMI 30.87 kg/m2        Burt Randhawa Sr is a 65 y.o. male (:1940) who presents for preoperative evaluation. Procedure/Surgery: L3-L5 Laminectomy Revision  Date of Procedure/Surgery:   Surgeon: Marcel Fry  [facility] Facility:  [facility]  Primary Physician: Carlyn Harris MD    Questions:  -Normal state of health today? Yes   -Do you usually get chest pain or breathlessness when you climb up two flights of stairs at normal speed? Denies. METS >4    Latex Allergy: Denies  Allergy to tape or adhesives: Denies    Didn't take BP meds today.  States he normally takes them around 6:00 PM.       Patient Active Problem List   Diagnosis Code    Hyperlipidemia, mixed E78.2    Impotence N52.9    Diverticulosis K57.90    Hemorrhoids K64.9    Vitamin D deficiency E55.9    Benign hypertension with chronic kidney disease, stage III I12.9, N18.3    GERD (gastroesophageal reflux disease) K21.9    Intermittent angle-closure glaucoma H40.239    Coronary artery disease involving native coronary artery of native heart without angina pectoris I25.10    Nephrolithiasis N20.0    Left median nerve neuropathy G56.12    CKD (chronic kidney disease) stage 3, GFR 30-59 ml/min N18.3    Advance directive discussed with patient Z71.89    Lumbar stenosis with neurogenic claudication M48.062    Unequal blood pressure in upper extremities R09.89    Glucose intolerance (impaired glucose tolerance) R73.02    Combined forms of age-related cataract of right eye H25.811    Combined forms of age-related cataract of left eye H25.812     Past Medical History:   Diagnosis Date    A-fib ([address]     Arthritis     CAD (coronary artery disease) 2013    Cataract     Diverticulosis 2009    Dyslipidemia 9/22/2009    Hemorrhoids 9/22/2009    HTN 9/22/2009    Hypercholesterolemia     Impotence 9/22/2009    MI (myocardial infarction) ([facility] [address] 12/2013    Nausea & vomiting     Neuropathy 9/22/2009    Open angle glaucoma with borderline intraocular pressure     Vitamin D deficiency 9/22/2009     Past Surgical History:   Procedure Laterality Date    ENDOSCOPY, COLON, DIAGNOSTIC  058294    HX CATARACT REMOVAL Right 09/13/2017    HX CORONARY ARTERY BYPASS GRAFT  12/2013    triple    HX HERNIA REPAIR Bilateral     HX LITHOTRIPSY  [id number]    HX ORTHOPAEDIC      back surgery     Social History     Social History    Marital status:      Spouse name: N/A    Number of children: N/A    Years of education: N/A     Social History Main Topics    Smoking status: Never Smoker    Smokeless tobacco: Never Used    Alcohol use No    Drug use: No    Sexual activity: Yes     Partners: Female     Other Topics Concern    None     Social History Narrative     Family History   Problem Relation Age of Onset    Hypertension Mother     Diabetes Mother     Cancer Father      LIVER    Alcohol abuse Brother     Diabetes Sister     Hypertension Sister     Arthritis-osteo Sister     Kidney Disease Sister      DIALYSIS    Alcohol abuse Brother     Suicide Brother     No Known Problems Brother     No Known Problems Brother     No Known Problems Brother     Arthritis-osteo Brother     Hypertension Son     Hypertension Son     Anesth Problems Neg Hx      Allergies   Allergen Reactions    Percocet [Oxycodone-Acetaminophen] Other (comments)     \"feel weird\"       Current Outpatient Prescriptions   Medication Sig    lisinopril-hydroCHLOROthiazide (PRINZIDE, ZESTORETIC) 20-12.5 mg per tablet TAKE TWO TABLETS ONCE DAILY    atorvastatin (LIPITOR) 80 mg tablet TAKE ONE TABLET ONCE DAILY    metoprolol succinate (TOPROL-XL) 100 mg tablet Take 100 mg by mouth daily.     carvedilol (COREG) 12.5 mg tablet Take 1 Tab by mouth two (2) times daily (with meals).  amLODIPine (NORVASC) 10 mg tablet Take 1 Tab by mouth daily. Indications: hypertension    DULoxetine (CYMBALTA) 60 mg capsule take 1 capsule by mouth once daily    acetaminophen (TYLENOL) 500 mg tablet Take 500 mg by mouth daily as needed for Pain.  aspirin 81 mg chewable tablet Take 1 Tab by mouth nightly.  cholecalciferol (VITAMIN D3) 1,000 unit cap Take  by mouth nightly. No current facility-administered medications for this visit. Recent use of: Takes aspirin 81mg daily. Pt states he was told to stop all NSAIDS 7 days prior to surgery. Tetanus up to date: No recent on file. Anesthesia Complications: Denies  History of abnormal bleeding: Denies  History of DVT or PE: Denies  History of Blood Transfusions: Denies  Health Care Directive or Living Will: Yes, asked pt to bring. Most recent flu vaccine: 09/29/2017  Most recent pneum vaccine: 03/2015      REVIEW OF SYSTEMS:  Constitutional: Negative for recent fever and chills. Skin: Negative for rash or skin lesions. HENT: Negative review. Eyes: Negative for visual changes. Cardiovascular:  Negative for chest pain, exertional dyspnea and palpitations. Respiratory: Negative for cough and hemoptysis, shortness of breath, orthopnea, PND. Gastrointestinal: Negative for nausea and vomitting. Negative for abdominal pain   diarrhea or constipation. No melena. Genitourinary: Negative for dysuria, blood in the urine, frequency or burning. Lymphoreticular: No lymphadenopathy. Musculoskeletal: +lower back pain, radiates down both legs  History of edema: Denies  Neurological: Negative for dizziness, seizures or syncopal episodes   Psychiatric: Negative.      Physical Examination:   Vital signs:   Visit Vitals    /75    Pulse 72    Temp 98.7 °F (37.1 °C) (Oral)    Resp 18    Ht 5' 8\" (1.727 m)    Wt 203 lb (92.1 kg)    SpO2 94%    BMI 30.87 kg/m2     General appearance - alert, well appearing, and in no distress  Mental status - alert, oriented to person, place, and time, normal mood, behavior, speech, dress, motor activity, and thought processes  Eyes - pupils equal and reactive, extraocular eye movements intact  Nose - normal and patent, no erythema, discharge or polyps  Mouth - mucous membranes moist, pharynx normal without lesions  Neck - supple, no significant adenopathy, no thyromegaly  Chest - clear to auscultation, no wheezes, rales or rhonchi, symmetric air entry  Heart - normal rate, regular rhythm, normal S1, S2, no murmurs, rubs, clicks or gallops  Abdomen - soft, nontender, nondistended  Neurological - alert, oriented, normal speech, no focal findings or movement disorder noted, cranial nerves II through XII intact, motor and sensory grossly normal bilaterally  Musculoskeletal - + lower back pain and reduced ROM  Extremities - peripheral pulses normal, no pedal edema, no clubbing or cyanosis  Skin - normal coloration and turgor, no rashes, no suspicious skin lesions noted       DIAGNOSTICS:   1. EKG: EKG FINDINGS - NSR, left atrial enlargement. Unchanged compared to previous study in July 2018. Will attach both to pre-op form.    2. Labs: Performed today as listed below    Lab Results   Component Value Date/Time    WBC 3.8 08/13/2018 01:35 PM    HGB 13.5 08/13/2018 01:35 PM    HCT 39.6 08/13/2018 01:35 PM    PLATELET 312 68/57/1767 01:35 PM    MCV 88 08/13/2018 01:35 PM     Lab Results   Component Value Date/Time    Sodium 145 (H) 08/13/2018 01:35 PM    Potassium 4.5 08/13/2018 01:35 PM    Chloride 107 (H) 08/13/2018 01:35 PM    CO2 24 08/13/2018 01:35 PM    Anion gap 6 07/05/2018 09:14 AM    Glucose 120 (H) 08/13/2018 01:35 PM    BUN 17 08/13/2018 01:35 PM    Creatinine 1.04 08/13/2018 01:35 PM    BUN/Creatinine ratio 16 08/13/2018 01:35 PM    GFR est AA 79 08/13/2018 01:35 PM    GFR est non-AA 68 08/13/2018 01:35 PM    Calcium 8.9 08/13/2018 01:35 PM    Bilirubin, total 0.6 07/05/2018 09:14 AM    AST (SGOT) 29 07/05/2018 09:14 AM    Alk. phosphatase 71 07/05/2018 09:14 AM    Protein, total 6.7 07/05/2018 09:14 AM    Albumin 3.6 07/05/2018 09:14 AM    Globulin 3.1 07/05/2018 09:14 AM    A-G Ratio 1.2 07/05/2018 09:14 AM    ALT (SGPT) 30 07/05/2018 09:14 AM     Lab Results   Component Value Date/Time    Hemoglobin A1c 5.9 (H) 08/13/2018 01:35 PM    Hemoglobin A1c (POC) 5.5 04/17/2017 10:15 AM     Lab Results   Component Value Date/Time    INR 1.0 08/13/2018 01:35 PM    INR 1.1 07/05/2018 09:14 AM    INR 1.1 09/16/2016 11:09 AM    Prothrombin time 10.7 08/13/2018 01:35 PM    Prothrombin time 10.7 07/05/2018 09:14 AM    Prothrombin time 10.8 09/16/2016 11:09 AM         ASSESSMENT and PLAN  Diagnoses and all orders for this visit:    1. Pre-op evaluation  -     CBC WITH AUTOMATED DIFF  -     METABOLIC PANEL, BASIC  -     PROTHROMBIN TIME + INR  -     UA/M W/RFLX CULTURE, COMP  -     AMB POC EKG ROUTINE W/ 12 LEADS, INTER & REP  Based on ACC/AHA guidelines patient is at acceptable risk for scheduled level of surgery. 2. Benign hypertension with chronic kidney disease, stage III  -Controlled on current regimen    3. Glucose intolerance (impaired glucose tolerance)  -     HEMOGLOBIN A1C WITH EAG  -Last A1C 5.6, controlled    4. Lumbar stenosis with neurogenic claudication  See #1    5. Coronary artery disease involving native coronary artery of native heart without angina pectoris  -     PROTHROMBIN TIME + INR              Patient Instructions          Lumbar Laminectomy: Before Your Surgery  What is a lumbar laminectomy? A lumbar laminectomy is surgery to ease pressure on the spinal cord and nerves of the lower spine. The doctor makes a 3- to 4-inch cut in the lower back. This cut is called an incision. Then the doctor takes out pieces of bone that are squeezing the spinal cord and nerves. He or she may also take out other tissues. Many people have less pain soon after surgery.  But your back may feel stiff and sore for a few months. Most people go home 1 to 2 days after surgery. You will likely return to work or your normal routine in 4 to 6 weeks. Follow-up care is a key part of your treatment and safety. Be sure to make and go to all appointments, and call your doctor if you are having problems. It's also a good idea to know your test results and keep a list of the medicines you take. What happens before surgery?   Surgery can be stressful. This information will help you understand what you can expect. And it will help you safely prepare for your surgery.   Preparing for surgery    · Understand exactly what surgery is planned, along with the risks, benefits, and other options. · Tell your doctors ALL the medicines, vitamins, supplements, and herbal remedies you take. Some of these can increase the risk of bleeding or interact with anesthesia.     · If you take blood thinners, such as warfarin (Coumadin), clopidogrel (Plavix), or aspirin, be sure to talk to your doctor. He or she will tell you if you should stop taking these medicines before your surgery. Make sure that you understand exactly what your doctor wants you to do.     · Your doctor will tell you which medicines to take or stop before your surgery. You may need to stop taking certain medicines a week or more before surgery. So talk to your doctor as soon as you can.     · If you have an advance directive, let your doctor know. It may include a living will and a durable power of  for health care. Bring a copy to the hospital. If you don't have one, you may want to prepare one. It lets your doctor and loved ones know your health care wishes. Doctors advise that everyone prepare these papers before any type of surgery or procedure. What happens on the day of surgery? · Follow the instructions exactly about when to stop eating and drinking. If you don't, your surgery may be canceled.  If your doctor has told you to take your medicines on the day of surgery, take them with only a sip of water.     · Take a bath or shower before you come in for your surgery. Do not apply lotions, perfumes, deodorants, or nail polish.     · Do not shave the surgical site yourself.     · Take off all jewelry and piercings. And take out contact lenses, if you wear them.    At the hospital or surgery center   · Bring a picture ID.     · The area for surgery is often marked to make sure there are no errors.     · You will be kept comfortable and safe by your anesthesia provider. You will be asleep during the surgery.     · The surgery will take about 1 to 1½ hours. If a spinal fusion is done at the same time, surgery will last 2 to 4 hours. Going home   · Be sure you have someone to drive you home. Anesthesia and pain medicine make it unsafe for you to drive.     · You will be given more specific instructions about recovering from your surgery. They will cover things like diet, wound care, follow-up care, driving, and getting back to your normal routine. When should you call your doctor? · You have questions or concerns.     · You don't understand how to prepare for your surgery.     · You become ill before the surgery (such as fever, flu, or a cold).     · You need to reschedule or have changed your mind about having the surgery. Where can you learn more? Go to http://bridget-primo.info/. Enter Q383 in the search box to learn more about \"Lumbar Laminectomy: Before Your Surgery. \"  Current as of: November 29, 2017  Content Version: 11.7  © 4722-5012 [employer]. Care instructions adapted under license by [employer] (which disclaims liability or warranty for this information). If you have questions about a medical condition or this instruction, always ask your healthcare professional. [address] any warranty or liability for your use of this information.         Please keep your follow-up appointment with Juliocesar Hopper MD.     Follow-up Disposition:  Return if symptoms worsen or fail to improve. Health Maintenance Due   Topic Date Due    DTaP/Tdap/Td series (1 - Tdap) 09/06/1960    ZOSTER VACCINE AGE 60>  07/06/1999    Influenza Age 5 to Adult  08/01/2018       I have discussed the diagnosis with the patient and the intended plan as seen in the above orders. Patient is in agreement. The patient has received an after-visit summary and questions were answered concerning future plans. I have discussed medication side effects and warnings with the patient as well. Warning signs for the above conditions were discussed including when to call our office or go to the emergency room. The nurse provided the patient and/or family with advanced directive information if needed and encouraged the patient to provide a copy to the office when available.

## 2018-08-13 NOTE — PATIENT INSTRUCTIONS
Lumbar Laminectomy: Before Your Surgery  What is a lumbar laminectomy? A lumbar laminectomy is surgery to ease pressure on the spinal cord and nerves of the lower spine. The doctor makes a 3- to 4-inch cut in the lower back. This cut is called an incision. Then the doctor takes out pieces of bone that are squeezing the spinal cord and nerves. He or she may also take out other tissues. Many people have less pain soon after surgery. But your back may feel stiff and sore for a few months. Most people go home 1 to 2 days after surgery. You will likely return to work or your normal routine in 4 to 6 weeks. Follow-up care is a key part of your treatment and safety. Be sure to make and go to all appointments, and call your doctor if you are having problems. It's also a good idea to know your test results and keep a list of the medicines you take. What happens before surgery?   Surgery can be stressful. This information will help you understand what you can expect. And it will help you safely prepare for your surgery.   Preparing for surgery    · Understand exactly what surgery is planned, along with the risks, benefits, and other options. · Tell your doctors ALL the medicines, vitamins, supplements, and herbal remedies you take. Some of these can increase the risk of bleeding or interact with anesthesia.     · If you take blood thinners, such as warfarin (Coumadin), clopidogrel (Plavix), or aspirin, be sure to talk to your doctor. He or she will tell you if you should stop taking these medicines before your surgery. Make sure that you understand exactly what your doctor wants you to do.     · Your doctor will tell you which medicines to take or stop before your surgery. You may need to stop taking certain medicines a week or more before surgery. So talk to your doctor as soon as you can.     · If you have an advance directive, let your doctor know.  It may include a living will and a durable power of  for health care. Bring a copy to the hospital. If you don't have one, you may want to prepare one. It lets your doctor and loved ones know your health care wishes. Doctors advise that everyone prepare these papers before any type of surgery or procedure. What happens on the day of surgery? · Follow the instructions exactly about when to stop eating and drinking. If you don't, your surgery may be canceled. If your doctor has told you to take your medicines on the day of surgery, take them with only a sip of water.     · Take a bath or shower before you come in for your surgery. Do not apply lotions, perfumes, deodorants, or nail polish.     · Do not shave the surgical site yourself.     · Take off all jewelry and piercings. And take out contact lenses, if you wear them.    At the hospital or surgery center   · Bring a picture ID.     · The area for surgery is often marked to make sure there are no errors.     · You will be kept comfortable and safe by your anesthesia provider. You will be asleep during the surgery.     · The surgery will take about 1 to 1½ hours. If a spinal fusion is done at the same time, surgery will last 2 to 4 hours. Going home   · Be sure you have someone to drive you home. Anesthesia and pain medicine make it unsafe for you to drive.     · You will be given more specific instructions about recovering from your surgery. They will cover things like diet, wound care, follow-up care, driving, and getting back to your normal routine. When should you call your doctor? · You have questions or concerns.     · You don't understand how to prepare for your surgery.     · You become ill before the surgery (such as fever, flu, or a cold).     · You need to reschedule or have changed your mind about having the surgery. Where can you learn more? Go to http://bridget-primo.info/.   Enter Y159 in the search box to learn more about \"Lumbar Laminectomy: Before Your Surgery. \"  Current as of: November 29, 2017  Content Version: 11.7  © 7758-2455 [employer], [facility]. Care instructions adapted under license by [employer] (which disclaims liability or warranty for this information). If you have questions about a medical condition or this instruction, always ask your healthcare professional. [place] [address] any warranty or liability for your use of this information.

## 2018-08-13 NOTE — PROGRESS NOTES
Nikita Thakur 73  Identified pt with two pt identifiers(name and ). Chief Complaint   Patient presents with    Pre-op Exam     Aug 28 Lower back       Reviewed record In preparation for visit and have obtained necessary documentation. Has info on advanced directive but has not filled them out. 1. Have you been to the ER, urgent care clinic or hospitalized since your last visit? No     2. Have you seen or consulted any other health care providers outside of the [address] [place] [address] since your last visit? Include any pap smears or colon screening. [place]    Vitals reviewed with provider.     Health Maintenance reviewed:     Health Maintenance Due   Topic    DTaP/Tdap/Td series (1 - Tdap)    ZOSTER VACCINE AGE 60>     Influenza Age 5 to Adult           Wt Readings from Last 3 Encounters:   18 203 lb (92.1 kg)   18 195 lb (88.5 kg)   18 201 lb (91.2 kg)        Temp Readings from Last 3 Encounters:   18 98.7 °F (37.1 °C) (Oral)   18 98.6 °F (37 °C) (Oral)   18 98.2 °F (36.8 °C)        BP Readings from Last 3 Encounters:   18 (!) 157/94   18 109/66   18 (!) 184/94        Pulse Readings from Last 3 Encounters:   18 72   18 74   18 68        Vitals:    18 1032 18 1035   BP: 167/85 (!) 157/94   Pulse: 72    Resp: 18    Temp: 98.7 °F (37.1 °C)    TempSrc: Oral    SpO2: 94%    Weight: 203 lb (92.1 kg)    Height: 5' 8\" (1.727 m)    PainSc:   3    PainLoc: Back           Learning Assessment:   :       Learning Assessment 10/14/2014   PRIMARY LEARNER Patient   HIGHEST LEVEL OF EDUCATION - PRIMARY LEARNER  GRADUATED HIGH SCHOOL OR GED   PRIMARY LANGUAGE ENGLISH   LEARNER PREFERENCE PRIMARY LISTENING   ANSWERED BY self   RELATIONSHIP SELF        Depression Screening:   :       PHQ over the last two weeks 2018   Little interest or pleasure in doing things Not at all   Feeling down, depressed, irritable, or hopeless Not at all   Total Score PHQ 2 0        Fall Risk Assessment:   :       Fall Risk Assessment, last 12 mths 8/15/2017   Able to walk? Yes   Fall in past 12 months? No   Fall with injury? -   Number of falls in past 12 months -   Fall Risk Score -        Abuse Screening:   :       Abuse Screening Questionnaire 3/29/2018 3/10/2017 3/29/2016 3/12/2015   Do you ever feel afraid of your partner? N N N N   Are you in a relationship with someone who physically or mentally threatens you? N N N N   Is it safe for you to go home?  Y Y Y Y        ADL Screening:   :       ADL Assessment 3/12/2015   Feeding yourself No Help Needed   Getting from bed to chair No Help Needed   Getting dressed No Help Needed   Bathing or showering No Help Needed   Walk across the room (includes cane/walker) No Help Needed   Using the telphone No Help Needed   Taking your medications No Help Needed   Preparing meals No Help Needed   Managing money (expenses/bills) No Help Needed   Moderately strenuous housework (laundry) No Help Needed   Shopping for personal items (toiletries/medicines) No Help Needed   Shopping for groceries No Help Needed   Driving No Help Needed   Climbing a flight of stairs No Help Needed   Getting to places beyond walking distances No Help Needed

## 2018-08-13 NOTE — MR AVS SNAPSHOT
[address] [id number] Patient: Christiana Willis 
MRN: [id number] XIC:7/5/5629 Visit Information Date & Time Provider Department Dept. Phone Encounter #  
 8/13/2018 10:30 AM Venita French NP [facility] Internal Medicine of [address] [place] [address] [id number] Follow-up Instructions Return if symptoms worsen or fail to improve. Your Appointments 9/24/2018  8:45 AM  
[facility] Appt Note: Non-Fasting Labs Neri Hood 7 [phone]  
  
   
 Erwin Nicolas  
  
    
 10/1/2018  8:45 AM  
ROUTINE CARE with Courtney Altman MD  
[facility] Internal Medicine of [facility]) Appt Note: 6 months (around 9/29/2018) for HTN, chol, gluc,  non-fasting lab work a week before visit Erwin 7 [phone]  
  
   
 [address] [facility] Upcoming Health Maintenance Date Due DTaP/Tdap/Td series (1 - Tdap) 9/6/1960 ZOSTER VACCINE AGE 60> 7/6/1999 Influenza Age 5 to Adult 8/1/2018 MEDICARE YEARLY EXAM 3/30/2019 GLAUCOMA SCREENING Q2Y 7/25/2019 Allergies as of 8/13/2018  Review Complete On: 8/13/2018 By: Sera Fraser LPN Severity Noted Reaction Type Reactions Percocet [Oxycodone-acetaminophen]  09/22/2009    Other (comments) \"feel weird\" Current Immunizations  Reviewed on 8/13/2018 Name Date Influenza High Dose Vaccine PF 9/29/2017, 9/14/2015 Influenza Vaccine (Quad) PF 9/9/2016 Pneumococcal Conjugate (PCV-13) 3/12/2015 ZZZ-RETIRED (DO NOT USE) Pneumococcal Vaccine (Unspecified Type) 4/22/2010 Reviewed by Sera Fraser LPN on 4/80/4636 at 58:59 AM  
You Were Diagnosed With   
  
 Codes Comments Pre-op evaluation    -  Primary ICD-10-CM: V94.780 ICD-9-CM: V72.84 Benign hypertension with chronic kidney disease, stage III     ICD-10-CM: I12.9, N18.3 ICD-9-CM: 403.10, 585.3 Glucose intolerance (impaired glucose tolerance)     ICD-10-CM: R73.02 
ICD-9-CM: 790.22 Lumbar stenosis with neurogenic claudication     ICD-10-CM: M48.062 
ICD-9-CM: 724.03 Coronary artery disease involving native coronary artery of native heart without angina pectoris     ICD-10-CM: I25.10 ICD-9-CM: 414.01 Vitals BP Pulse Temp Resp Height(growth percentile) Weight(growth percentile) 130/75 72 98.7 °F (37.1 °C) (Oral) 18 5' 8\" (1.727 m) 203 lb (92.1 kg) SpO2 BMI Smoking Status 94% 30.87 kg/m2 Never Smoker Vitals History BMI and BSA Data Body Mass Index Body Surface Area  
 30.87 kg/m 2 2.1 m 2 Preferred Pharmacy Pharmacy Name Phone [facility] [place], [place] [phone] Your Updated Medication List  
  
   
This list is accurate as of 8/13/18 11:28 AM.  Always use your most recent med list.  
  
  
  
  
 acetaminophen 500 mg tablet Commonly known as:  TYLENOL Take 500 mg by mouth daily as needed for Pain. amLODIPine 10 mg tablet Commonly known as:  [place] Take 1 Tab by mouth daily. Indications: hypertension  
  
 aspirin 81 mg chewable tablet Take 1 Tab by mouth nightly. atorvastatin 80 mg tablet Commonly known as:  LIPITOR  
TAKE ONE TABLET ONCE DAILY  
  
 carvedilol 12.5 mg tablet Commonly known as:  Mar [place] Take 1 Tab by mouth two (2) times daily (with meals). cholecalciferol 1,000 unit Cap Commonly known as:  VITAMIN D3 Take  by mouth nightly. DULoxetine 60 mg capsule Commonly known as:  CYMBALTA  
take 1 capsule by mouth once daily  
  
 lisinopril-hydroCHLOROthiazide 20-12.5 mg per tablet Commonly known as:  PRINZIDE, ZESTORETIC  
TAKE TWO TABLETS ONCE DAILY metoprolol succinate 100 mg tablet Commonly known as:  TOPROL-XL Take 100 mg by mouth daily. We Performed the Following AMB POC EKG ROUTINE W/ 12 LEADS, INTER & REP [11149 CPT(R)] CBC WITH AUTOMATED DIFF [43482 CPT(R)] HEMOGLOBIN A1C WITH EAG [74386 CPT(R)] METABOLIC PANEL, BASIC [48873 CPT(R)] PROTHROMBIN TIME + INR [36128 CPT(R)] UA/M W/RFLX CULTURE, COMP [53469 CPT(R)] Follow-up Instructions Return if symptoms worsen or fail to improve. To-Do List   
 08/16/2018 2:00 PM  
  Appointment with [facility] PAT EXAM RM 6 at [facility] ([phone]) Patient Instructions Lumbar Laminectomy: Before Your Surgery What is a lumbar laminectomy? A lumbar laminectomy is surgery to ease pressure on the spinal cord and nerves of the lower spine. The doctor makes a 3- to 4-inch cut in the lower back. This cut is called an incision. Then the doctor takes out pieces of bone that are squeezing the spinal cord and nerves. He or she may also take out other tissues. Many people have less pain soon after surgery. But your back may feel stiff and sore for a few months. Most people go home 1 to 2 days after surgery. You will likely return to work or your normal routine in 4 to 6 weeks. Follow-up care is a key part of your treatment and safety. Be sure to make and go to all appointments, and call your doctor if you are having problems. It's also a good idea to know your test results and keep a list of the medicines you take. What happens before surgery? 
 Surgery can be stressful. This information will help you understand what you can expect. And it will help you safely prepare for your surgery. 
 Preparing for surgery 
  · Understand exactly what surgery is planned, along with the risks, benefits, and other options.   
 · Tell your doctors ALL the medicines, vitamins, supplements, and herbal remedies you take. Some of these can increase the risk of bleeding or interact with anesthesia.  
  · If you take blood thinners, such as warfarin (Coumadin), clopidogrel (Plavix), or aspirin, be sure to talk to your doctor. He or she will tell you if you should stop taking these medicines before your surgery. Make sure that you understand exactly what your doctor wants you to do.  
  · Your doctor will tell you which medicines to take or stop before your surgery. You may need to stop taking certain medicines a week or more before surgery. So talk to your doctor as soon as you can.  
  · If you have an advance directive, let your doctor know. It may include a living will and a durable power of  for health care. Bring a copy to the hospital. If you don't have one, you may want to prepare one. It lets your doctor and loved ones know your health care wishes. Doctors advise that everyone prepare these papers before any type of surgery or procedure. What happens on the day of surgery? · Follow the instructions exactly about when to stop eating and drinking. If you don't, your surgery may be canceled. If your doctor has told you to take your medicines on the day of surgery, take them with only a sip of water.  
  · Take a bath or shower before you come in for your surgery. Do not apply lotions, perfumes, deodorants, or nail polish.  
  · Do not shave the surgical site yourself.  
  · Take off all jewelry and piercings. And take out contact lenses, if you wear them.  
 At the hospital or surgery center · Bring a picture ID.  
  · The area for surgery is often marked to make sure there are no errors.  
  · You will be kept comfortable and safe by your anesthesia provider. You will be asleep during the surgery.  
  · The surgery will take about 1 to 1½ hours. If a spinal fusion is done at the same time, surgery will last 2 to 4 hours. Going home · Be sure you have someone to drive you home. Anesthesia and pain medicine make it unsafe for you to drive.  
  · You will be given more specific instructions about recovering from your surgery. They will cover things like diet, wound care, follow-up care, driving, and getting back to your normal routine. When should you call your doctor? · You have questions or concerns.  
  · You don't understand how to prepare for your surgery.  
  · You become ill before the surgery (such as fever, flu, or a cold).  
  · You need to reschedule or have changed your mind about having the surgery. Where can you learn more? Go to http://bridget-primo.info/. Enter T612 in the search box to learn more about \"Lumbar Laminectomy: Before Your Surgery. \" Current as of: November 29, 2017 Content Version: 11.7 © 2007-1454 [employer]. Care instructions adapted under license by [employer] (which disclaims liability or warranty for this information). If you have questions about a medical condition or this instruction, always ask your healthcare professional. [address] any warranty or liability for your use of this information. Introducing [facility]! [facility] introduces MobileWeaver patient portal. Now you can access parts of your medical record, email your doctor's office, and request medication refills online. 1. In your internet browser, go to [email or web address] 2. Click on the First Time User? Click Here link in the Sign In box. You will see the New Member Sign Up page. 3. Enter your MobileWeaver Access Code exactly as it appears below. You will not need to use this code after youve completed the sign-up process. If you do not sign up before the expiration date, you must request a new code. · MobileWeaver Access Code: [email or web address] Expires: 9/30/2018 11:21 AM 
 
 4. Enter the last four digits of your Social Security Number (xxxx) and Date of Birth (mm/dd/yyyy) as indicated and click Submit. You will be taken to the next sign-up page. 5. Create a Tailored ID. This will be your Tailored login ID and cannot be changed, so think of one that is secure and easy to remember. 6. Create a Tailored password. You can change your password at any time. 7. Enter your Password Reset Question and Answer. This can be used at a later time if you forget your password. 8. Enter your e-mail address. You will receive e-mail notification when new information is available in [address] 9. Click Sign Up. You can now view and download portions of your medical record. 10. Click the Download Summary menu link to download a portable copy of your medical information. If you have questions, please visit the Frequently Asked Questions section of the Tailored website. Remember, Tailored is NOT to be used for urgent needs. For medical emergencies, dial 911. Now available from your iPhone and Android! Please provide this summary of care documentation to your next provider. Your primary care clinician is listed as Omar Gilbert. If you have any questions after today's visit, please call [phone].

## 2018-08-14 LAB
APPEARANCE UR: CLEAR
BACTERIA #/AREA URNS HPF: NORMAL /[HPF]
BASOPHILS # BLD AUTO: 0 X10E3/UL (ref 0–0.2)
BASOPHILS NFR BLD AUTO: 1 %
BILIRUB UR QL STRIP: NEGATIVE
BUN SERPL-MCNC: 17 MG/DL (ref 8–27)
BUN/CREAT SERPL: 16 (ref 10–24)
CALCIUM SERPL-MCNC: 8.9 MG/DL (ref 8.6–10.2)
CASTS URNS QL MICRO: NORMAL /LPF
CHLORIDE SERPL-SCNC: 107 MMOL/L (ref 96–106)
CO2 SERPL-SCNC: 24 MMOL/L (ref 20–29)
COLOR UR: YELLOW
CREAT SERPL-MCNC: 1.04 MG/DL (ref 0.76–1.27)
EOSINOPHIL # BLD AUTO: 0.1 X10E3/UL (ref 0–0.4)
EOSINOPHIL NFR BLD AUTO: 2 %
EPI CELLS #/AREA URNS HPF: NORMAL /HPF
ERYTHROCYTE [DISTWIDTH] IN BLOOD BY AUTOMATED COUNT: 13.9 % (ref 12.3–15.4)
EST. AVERAGE GLUCOSE BLD GHB EST-MCNC: 123 MG/DL
GLUCOSE SERPL-MCNC: 120 MG/DL (ref 65–99)
GLUCOSE UR QL: NEGATIVE
HBA1C MFR BLD: 5.9 % (ref 4.8–5.6)
HCT VFR BLD AUTO: 39.6 % (ref 37.5–51)
HGB BLD-MCNC: 13.5 G/DL (ref 13–17.7)
HGB UR QL STRIP: NEGATIVE
IMM GRANULOCYTES # BLD: 0 X10E3/UL (ref 0–0.1)
IMM GRANULOCYTES NFR BLD: 1 %
INR PPP: 1 (ref 0.8–1.2)
KETONES UR QL STRIP: NEGATIVE
LEUKOCYTE ESTERASE UR QL STRIP: NEGATIVE
LYMPHOCYTES # BLD AUTO: 1.1 X10E3/UL (ref 0.7–3.1)
LYMPHOCYTES NFR BLD AUTO: 29 %
MCH RBC QN AUTO: 29.9 PG (ref 26.6–33)
MCHC RBC AUTO-ENTMCNC: 34.1 G/DL (ref 31.5–35.7)
MCV RBC AUTO: 88 FL (ref 79–97)
MICRO URNS: NORMAL
MICRO URNS: NORMAL
MONOCYTES # BLD AUTO: 0.4 X10E3/UL (ref 0.1–0.9)
MONOCYTES NFR BLD AUTO: 10 %
MUCOUS THREADS URNS QL MICRO: PRESENT
NEUTROPHILS # BLD AUTO: 2.2 X10E3/UL (ref 1.4–7)
NEUTROPHILS NFR BLD AUTO: 57 %
NITRITE UR QL STRIP: NEGATIVE
PH UR STRIP: 5.5 [PH] (ref 5–7.5)
PLATELET # BLD AUTO: 151 X10E3/UL (ref 150–379)
POTASSIUM SERPL-SCNC: 4.5 MMOL/L (ref 3.5–5.2)
PROT UR QL STRIP: NEGATIVE
PROTHROMBIN TIME: 10.7 SEC (ref 9.1–12)
RBC # BLD AUTO: 4.52 X10E6/UL (ref 4.14–5.8)
RBC #/AREA URNS HPF: NORMAL /HPF
SODIUM SERPL-SCNC: 145 MMOL/L (ref 134–144)
SP GR UR: 1.02 (ref 1–1.03)
URINALYSIS REFLEX , 377201: NORMAL
UROBILINOGEN UR STRIP-MCNC: 0.2 MG/DL (ref 0.2–1)
WBC # BLD AUTO: 3.8 X10E3/UL (ref 3.4–10.8)
WBC #/AREA URNS HPF: NORMAL /HPF

## 2018-08-22 DIAGNOSIS — M48.062 LUMBAR STENOSIS WITH NEUROGENIC CLAUDICATION: ICD-10-CM

## 2018-08-22 RX ORDER — DULOXETIN HYDROCHLORIDE 60 MG/1
CAPSULE, DELAYED RELEASE ORAL
Qty: 30 CAP | Refills: 5 | Status: SHIPPED | OUTPATIENT
Start: 2018-08-22 | End: 2019-04-11

## 2018-08-30 ENCOUNTER — ANESTHESIA EVENT (OUTPATIENT)
Dept: SURGERY | Age: 78
End: 2018-08-30
Payer: MEDICARE

## 2018-08-30 NOTE — H&P
[facility] 
Location: [address] Patient #: [id number] : 1940  / Language: [place] / Race: Black or  Male History of Present Illness The patient is a 65year old male who presents for a Recheck of Chronic lumbar back pain. The last clinic visit was 3 week(s) ago. Management changes made at the last visit include ordering test(s). Symptoms include pain and decreased range of motion. Symptoms are located in the low back. The pain radiates to the left lower leg and right lower leg. The patient describes the pain as sharp and aching. Onset was gradual. The symptoms occur frequently. The patient describes symptoms as moderate in severity and worsening. Symptoms are exacerbated by weight bearing, standing and sitting. The patient has a surgical history of back surgery (2016). The patient was previously evaluated in this clinic 3 week(s) ago. Past evaluation has included x-ray of the lumbar spine and MRI of the lumbar spine. Past treatment has included epidural injection and back surgery. Note for \"Chronic lumbar back pain\": He continues to have severe lower back pain and bilateral leg pain.  He has difficulty with prolonged standing and walking.  He has a history of a previous lumbar surgery of which she is unaware of the details many years ago.  Recently he's been having increasing pain with standing as well as walking.  He denies any bowel bladder difficulties.  He's had some Ultram without relief. Problem List/Past Medical  
REVIEW OF SYSTEMS: Systems were reviewed by the provider.   
Work Status update given   
Stenosis (724.02  M99.83)   
Lumbar dysfunction (739.3  M99.9)   
Lumbar stenosis with neurogenic claudication (724.03  M48.062)   
S/P lumbar laminectomy (V45.89  Z98.890)   Chronic low back pain (724.2  M54.5)   Allergies No Known Drug Allergies Medication History Medications Reconciled  
 
 Diagnostic Studies History MRI, SpineResults: Review of the lumbar MRI demonstrates focal stenosis again at L4-5 due to facet hypertrophy as well as ligamentum hypertrophy. Lumbar Spine X-ray   Results: AP and lateral films of the lumbar spine reveal a lumbar laminectomy at L4-5. Lumbar Spine X-ray   Results: Review of the lumbar x-rays demonstrate spondylosis at L4-5. No subluxations fractures or lytic lesions. MRI Lumbar Spine  Results: Prior laminectomy at L4-5; mild recurrent stenosis; Moderate to severe stenosis at L3-4; no subulzuations or fractures or lytic lesions. Other Problems Treatment options were discussed with the patient in full.   
Unspecified Diagnosis   
 
Physical Exam  
Neurologic Sensory Light Touch - Intact - Globally. Overall Assessment of Muscle Strength and Tone reveals Lower Extremities - Right Iliopsoas - 5/5. Left Iliopsoas - 5/5. Right Tibialis Anterior - 5/5. Left Tibialis Anterior - 5/5. Right Gastroc-Soleus - 5/5. Left Gastroc-Soleus - 5/5. Right EHL - 4/5 . Left EHL - 4/5. General Assessment of Reflexes Right Ankle - Clonus is not present. Left Ankle - Clonus is not present. Reflexes (Dermatomes) 2/2 Normal - Left Achilles (L5-S2), Left Knee (L2-4), Right Achilles (L5-S2) and Right Knee (L2-4). Musculoskeletal 
Global Assessment Examination of related systems reveals - well-developed, well-nourished, in no acute distress, alert and oriented x 3. Gait and Station - normal gait and station and normal posture. Right Lower Extremity - normal strength and tone, normal range of motion without pain and no instability, subluxation or laxity. Left Lower Extremity - normal strength and tone, normal range of motion without pain and no instability, subluxation or laxity. Spine/Ribs/Pelvis Cervical Spine - Examination of the cervical spine reveals - no tenderness to palpation, no pain, no swelling, edema or erythema, normal cervical spine movements and normal sensation. Thoracic (Dorsal) Spine - Examination of the thoracic spine reveals - no tenderness over thoracic vertebrae, no pain, normal sensation and normal thoracic spine movements. Lumbosacral Spine - Examination of the lumbosacral spine reveals - no known fractures or deformities. Inspection and Palpation - Tenderness - mild. Assessment of pain reveals the following findings - The pain is characterized as - mild. Location - . Lumbosacral Spine - Functional Testing - Babinski Test negative, Prone Knee Bending Test negative, Slump Test negative, Straight Leg Raising Test negative. Assessment & Plan Lumbar stenosis with neurogenic claudication (724.03  M48.062) Impression: He has again developed stenosis primarily above his prior laminectomy. He has temporary relief with injectrions. I am recommending a revision laminetomy L3-5. He does not need a fusion. The risks and benefits were discussed at length with the patient and the patient has elected to proceed. Indications for surgery include failed conservative treatment. Alternative treatments, risks and the perioperative course were discussed with the patient. All questions were answered. The risks and benefits of the procedure were explained. Benefits include definitive diagnosis, relief of pain, elimination of deformity and improved function. Risks of surgery including bleeding, infection, weakness, numbness, CSF leak, failure to improve symptoms, exacerbation of medical co-morbidities and even death were discussed with the patient. v 
Current Plans Pt Education - How to access health information online: discussed with patient and provided information. Pt Education - Educational materials were provided.: discussed with patient and provided information. S/P lumbar laminectomy (V45.89  Z98.890) Treatment options were discussed with the patient in full.(V65.49) Current Plans Presurgical planning was preformed with the patient today Surgery to be scheduled Procedure:  Revision laminectomy L3-5 Signed by Napoleon Paris MD 
 
Date of Surgery Update: 
Sam Roberson was seen and examined. History and physical has been reviewed. The patient has been examined.  There have been no significant clinical changes since the completion of the originally dated History and Physical. 
 
Signed By: Napoleon Paris MD   
 August 31, 2018 7:31 AM

## 2018-08-31 ENCOUNTER — APPOINTMENT (OUTPATIENT)
Dept: GENERAL RADIOLOGY | Age: 78
End: 2018-08-31
Attending: ORTHOPAEDIC SURGERY
Payer: MEDICARE

## 2018-08-31 ENCOUNTER — HOSPITAL ENCOUNTER (OUTPATIENT)
Age: 78
Setting detail: OBSERVATION
Discharge: HOME | End: 2018-09-01
Attending: ORTHOPAEDIC SURGERY | Admitting: ORTHOPAEDIC SURGERY
Payer: MEDICARE

## 2018-08-31 ENCOUNTER — ANESTHESIA (OUTPATIENT)
Dept: SURGERY | Age: 78
End: 2018-08-31
Payer: MEDICARE

## 2018-08-31 PROBLEM — M48.00 SPINAL STENOSIS: Status: ACTIVE | Noted: 2018-08-31

## 2018-08-31 LAB
ABO + RH BLD: NORMAL
BLOOD GROUP ANTIBODIES SERPL: NORMAL
GLUCOSE BLD STRIP.AUTO-MCNC: 117 MG/DL (ref 65–100)
SERVICE CMNT-IMP: ABNORMAL
SPECIMEN EXP DATE BLD: NORMAL

## 2018-08-31 PROCEDURE — V2790 AMNIOTIC MEMBRANE: HCPCS | Performed by: ORTHOPAEDIC SURGERY

## 2018-08-31 PROCEDURE — 77030038600 HC TU BPLR IRR DISP STRY -B: Performed by: ORTHOPAEDIC SURGERY

## 2018-08-31 PROCEDURE — 77030037713 HC CLOSR DEV INCIS ZIP STRY -B: Performed by: ORTHOPAEDIC SURGERY

## 2018-08-31 PROCEDURE — 74011250636 HC RX REV CODE- 250/636

## 2018-08-31 PROCEDURE — 76010000162 HC OR TIME 1.5 TO 2 HR INTENSV-TIER 1: Performed by: ORTHOPAEDIC SURGERY

## 2018-08-31 PROCEDURE — 77030031139 HC SUT VCRL2 J&J -A: Performed by: ORTHOPAEDIC SURGERY

## 2018-08-31 PROCEDURE — 86900 BLOOD TYPING SEROLOGIC ABO: CPT | Performed by: ORTHOPAEDIC SURGERY

## 2018-08-31 PROCEDURE — 74011000250 HC RX REV CODE- 250

## 2018-08-31 PROCEDURE — 99218 HC RM OBSERVATION: CPT

## 2018-08-31 PROCEDURE — 36415 COLL VENOUS BLD VENIPUNCTURE: CPT | Performed by: ORTHOPAEDIC SURGERY

## 2018-08-31 PROCEDURE — 77030032490 HC SLV COMPR SCD KNE COVD -B: Performed by: ORTHOPAEDIC SURGERY

## 2018-08-31 PROCEDURE — 77030029099 HC BN WAX SSPC -A: Performed by: ORTHOPAEDIC SURGERY

## 2018-08-31 PROCEDURE — 77030008684 HC TU ET CUF COVD -B: Performed by: ANESTHESIOLOGY

## 2018-08-31 PROCEDURE — 77030038020 HC MANFLD NEPTUNE STRY -B: Performed by: ORTHOPAEDIC SURGERY

## 2018-08-31 PROCEDURE — 74011250636 HC RX REV CODE- 250/636: Performed by: PHYSICIAN ASSISTANT

## 2018-08-31 PROCEDURE — 77030013079 HC BLNKT BAIR HGGR 3M -A: Performed by: ANESTHESIOLOGY

## 2018-08-31 PROCEDURE — 74011250637 HC RX REV CODE- 250/637: Performed by: PHYSICIAN ASSISTANT

## 2018-08-31 PROCEDURE — 76210000006 HC OR PH I REC 0.5 TO 1 HR: Performed by: ORTHOPAEDIC SURGERY

## 2018-08-31 PROCEDURE — 74011250636 HC RX REV CODE- 250/636: Performed by: ANESTHESIOLOGY

## 2018-08-31 PROCEDURE — 77030026438 HC STYL ET INTUB CARD -A: Performed by: ANESTHESIOLOGY

## 2018-08-31 PROCEDURE — 76000 FLUOROSCOPY <1 HR PHYS/QHP: CPT

## 2018-08-31 PROCEDURE — 76060000034 HC ANESTHESIA 1.5 TO 2 HR: Performed by: ORTHOPAEDIC SURGERY

## 2018-08-31 PROCEDURE — 82962 GLUCOSE BLOOD TEST: CPT

## 2018-08-31 DEVICE — IMPLANT THN HYDRPHLC AMNIO MEMEBRANE 4 X 4 CM: Type: IMPLANTABLE DEVICE | Site: SPINE LUMBAR | Status: FUNCTIONAL

## 2018-08-31 RX ORDER — ACETAMINOPHEN 500 MG
1000 TABLET ORAL EVERY 6 HOURS
Status: DISCONTINUED | OUTPATIENT
Start: 2018-08-31 | End: 2018-09-01 | Stop reason: HOSPADM

## 2018-08-31 RX ORDER — CEFAZOLIN SODIUM IN 0.9 % NACL 2 G/100 ML
PLASTIC BAG, INJECTION (ML) INTRAVENOUS AS NEEDED
Status: DISCONTINUED | OUTPATIENT
Start: 2018-08-31 | End: 2018-08-31 | Stop reason: HOSPADM

## 2018-08-31 RX ORDER — SODIUM CHLORIDE, SODIUM LACTATE, POTASSIUM CHLORIDE, CALCIUM CHLORIDE 600; 310; 30; 20 MG/100ML; MG/100ML; MG/100ML; MG/100ML
INJECTION, SOLUTION INTRAVENOUS
Status: DISCONTINUED | OUTPATIENT
Start: 2018-08-31 | End: 2018-08-31 | Stop reason: HOSPADM

## 2018-08-31 RX ORDER — DEXAMETHASONE SODIUM PHOSPHATE 4 MG/ML
INJECTION, SOLUTION INTRA-ARTICULAR; INTRALESIONAL; INTRAMUSCULAR; INTRAVENOUS; SOFT TISSUE AS NEEDED
Status: DISCONTINUED | OUTPATIENT
Start: 2018-08-31 | End: 2018-08-31 | Stop reason: HOSPADM

## 2018-08-31 RX ORDER — METOPROLOL SUCCINATE 50 MG/1
100 TABLET, EXTENDED RELEASE ORAL DAILY
Status: DISCONTINUED | OUTPATIENT
Start: 2018-09-01 | End: 2018-09-01 | Stop reason: HOSPADM

## 2018-08-31 RX ORDER — DIPHENHYDRAMINE HYDROCHLORIDE 50 MG/ML
12.5 INJECTION, SOLUTION INTRAMUSCULAR; INTRAVENOUS
Status: DISCONTINUED | OUTPATIENT
Start: 2018-08-31 | End: 2018-09-01 | Stop reason: HOSPADM

## 2018-08-31 RX ORDER — POLYETHYLENE GLYCOL 3350 17 G/17G
17 POWDER, FOR SOLUTION ORAL DAILY
Status: DISCONTINUED | OUTPATIENT
Start: 2018-09-01 | End: 2018-09-01 | Stop reason: HOSPADM

## 2018-08-31 RX ORDER — DULOXETIN HYDROCHLORIDE 60 MG/1
60 CAPSULE, DELAYED RELEASE ORAL DAILY
Status: DISCONTINUED | OUTPATIENT
Start: 2018-09-01 | End: 2018-09-01 | Stop reason: HOSPADM

## 2018-08-31 RX ORDER — ROCURONIUM BROMIDE 10 MG/ML
INJECTION, SOLUTION INTRAVENOUS AS NEEDED
Status: DISCONTINUED | OUTPATIENT
Start: 2018-08-31 | End: 2018-08-31 | Stop reason: HOSPADM

## 2018-08-31 RX ORDER — HYDROMORPHONE HYDROCHLORIDE 2 MG/1
2 TABLET ORAL
Status: DISCONTINUED | OUTPATIENT
Start: 2018-08-31 | End: 2018-09-01 | Stop reason: HOSPADM

## 2018-08-31 RX ORDER — MORPHINE SULFATE 10 MG/ML
2 INJECTION, SOLUTION INTRAMUSCULAR; INTRAVENOUS
Status: DISCONTINUED | OUTPATIENT
Start: 2018-08-31 | End: 2018-08-31 | Stop reason: HOSPADM

## 2018-08-31 RX ORDER — ATORVASTATIN CALCIUM 40 MG/1
80 TABLET, FILM COATED ORAL DAILY
Status: DISCONTINUED | OUTPATIENT
Start: 2018-09-01 | End: 2018-09-01 | Stop reason: HOSPADM

## 2018-08-31 RX ORDER — FENTANYL CITRATE 50 UG/ML
25 INJECTION, SOLUTION INTRAMUSCULAR; INTRAVENOUS
Status: DISCONTINUED | OUTPATIENT
Start: 2018-08-31 | End: 2018-09-01 | Stop reason: HOSPADM

## 2018-08-31 RX ORDER — DIPHENHYDRAMINE HYDROCHLORIDE 50 MG/ML
12.5 INJECTION, SOLUTION INTRAMUSCULAR; INTRAVENOUS AS NEEDED
Status: DISCONTINUED | OUTPATIENT
Start: 2018-08-31 | End: 2018-08-31 | Stop reason: HOSPADM

## 2018-08-31 RX ORDER — AMLODIPINE BESYLATE 5 MG/1
10 TABLET ORAL DAILY
Status: DISCONTINUED | OUTPATIENT
Start: 2018-09-01 | End: 2018-09-01 | Stop reason: HOSPADM

## 2018-08-31 RX ORDER — GUAIFENESIN 100 MG/5ML
81 LIQUID (ML) ORAL DAILY
Status: DISCONTINUED | OUTPATIENT
Start: 2018-09-01 | End: 2018-09-01 | Stop reason: HOSPADM

## 2018-08-31 RX ORDER — AMOXICILLIN 250 MG
1 CAPSULE ORAL 2 TIMES DAILY
Status: DISCONTINUED | OUTPATIENT
Start: 2018-08-31 | End: 2018-09-01 | Stop reason: HOSPADM

## 2018-08-31 RX ORDER — LIDOCAINE HYDROCHLORIDE 20 MG/ML
INJECTION, SOLUTION EPIDURAL; INFILTRATION; INTRACAUDAL; PERINEURAL AS NEEDED
Status: DISCONTINUED | OUTPATIENT
Start: 2018-08-31 | End: 2018-08-31 | Stop reason: HOSPADM

## 2018-08-31 RX ORDER — SODIUM CHLORIDE 0.9 % (FLUSH) 0.9 %
5-10 SYRINGE (ML) INJECTION AS NEEDED
Status: DISCONTINUED | OUTPATIENT
Start: 2018-08-31 | End: 2018-09-01 | Stop reason: HOSPADM

## 2018-08-31 RX ORDER — EPHEDRINE SULFATE 50 MG/ML
INJECTION, SOLUTION INTRAVENOUS AS NEEDED
Status: DISCONTINUED | OUTPATIENT
Start: 2018-08-31 | End: 2018-08-31 | Stop reason: HOSPADM

## 2018-08-31 RX ORDER — SODIUM CHLORIDE 9 MG/ML
25 INJECTION, SOLUTION INTRAVENOUS CONTINUOUS
Status: DISCONTINUED | OUTPATIENT
Start: 2018-08-31 | End: 2018-08-31 | Stop reason: HOSPADM

## 2018-08-31 RX ORDER — GUAIFENESIN 100 MG/5ML
81 LIQUID (ML) ORAL
Status: DISCONTINUED | OUTPATIENT
Start: 2018-08-31 | End: 2018-08-31

## 2018-08-31 RX ORDER — SODIUM CHLORIDE, SODIUM LACTATE, POTASSIUM CHLORIDE, CALCIUM CHLORIDE 600; 310; 30; 20 MG/100ML; MG/100ML; MG/100ML; MG/100ML
75 INJECTION, SOLUTION INTRAVENOUS CONTINUOUS
Status: DISCONTINUED | OUTPATIENT
Start: 2018-08-31 | End: 2018-08-31 | Stop reason: HOSPADM

## 2018-08-31 RX ORDER — SODIUM CHLORIDE 0.9 % (FLUSH) 0.9 %
5-10 SYRINGE (ML) INJECTION AS NEEDED
Status: DISCONTINUED | OUTPATIENT
Start: 2018-08-31 | End: 2018-08-31 | Stop reason: HOSPADM

## 2018-08-31 RX ORDER — LIDOCAINE HYDROCHLORIDE 10 MG/ML
0.1 INJECTION, SOLUTION EPIDURAL; INFILTRATION; INTRACAUDAL; PERINEURAL AS NEEDED
Status: DISCONTINUED | OUTPATIENT
Start: 2018-08-31 | End: 2018-08-31 | Stop reason: HOSPADM

## 2018-08-31 RX ORDER — TRAMADOL HYDROCHLORIDE 50 MG/1
50 TABLET ORAL
Status: DISCONTINUED | OUTPATIENT
Start: 2018-08-31 | End: 2018-09-01 | Stop reason: HOSPADM

## 2018-08-31 RX ORDER — KETOROLAC TROMETHAMINE 30 MG/ML
15 INJECTION, SOLUTION INTRAMUSCULAR; INTRAVENOUS EVERY 6 HOURS
Status: COMPLETED | OUTPATIENT
Start: 2018-08-31 | End: 2018-09-01

## 2018-08-31 RX ORDER — SODIUM CHLORIDE 0.9 % (FLUSH) 0.9 %
5-10 SYRINGE (ML) INJECTION EVERY 8 HOURS
Status: DISCONTINUED | OUTPATIENT
Start: 2018-09-01 | End: 2018-09-01 | Stop reason: HOSPADM

## 2018-08-31 RX ORDER — SODIUM CHLORIDE, SODIUM LACTATE, POTASSIUM CHLORIDE, CALCIUM CHLORIDE 600; 310; 30; 20 MG/100ML; MG/100ML; MG/100ML; MG/100ML
125 INJECTION, SOLUTION INTRAVENOUS CONTINUOUS
Status: DISCONTINUED | OUTPATIENT
Start: 2018-08-31 | End: 2018-08-31 | Stop reason: HOSPADM

## 2018-08-31 RX ORDER — FACIAL-BODY WIPES
10 EACH TOPICAL DAILY PRN
Status: DISCONTINUED | OUTPATIENT
Start: 2018-09-02 | End: 2018-09-01 | Stop reason: HOSPADM

## 2018-08-31 RX ORDER — MIDAZOLAM HYDROCHLORIDE 1 MG/ML
1 INJECTION, SOLUTION INTRAMUSCULAR; INTRAVENOUS AS NEEDED
Status: DISCONTINUED | OUTPATIENT
Start: 2018-08-31 | End: 2018-08-31 | Stop reason: HOSPADM

## 2018-08-31 RX ORDER — PROPOFOL 10 MG/ML
INJECTION, EMULSION INTRAVENOUS AS NEEDED
Status: DISCONTINUED | OUTPATIENT
Start: 2018-08-31 | End: 2018-08-31 | Stop reason: HOSPADM

## 2018-08-31 RX ORDER — SODIUM CHLORIDE 0.9 % (FLUSH) 0.9 %
5-10 SYRINGE (ML) INJECTION EVERY 8 HOURS
Status: DISCONTINUED | OUTPATIENT
Start: 2018-08-31 | End: 2018-08-31 | Stop reason: HOSPADM

## 2018-08-31 RX ORDER — ONDANSETRON 2 MG/ML
4 INJECTION INTRAMUSCULAR; INTRAVENOUS AS NEEDED
Status: DISCONTINUED | OUTPATIENT
Start: 2018-08-31 | End: 2018-08-31 | Stop reason: HOSPADM

## 2018-08-31 RX ORDER — HYDROMORPHONE HYDROCHLORIDE 2 MG/ML
INJECTION, SOLUTION INTRAMUSCULAR; INTRAVENOUS; SUBCUTANEOUS AS NEEDED
Status: DISCONTINUED | OUTPATIENT
Start: 2018-08-31 | End: 2018-08-31 | Stop reason: HOSPADM

## 2018-08-31 RX ORDER — CARVEDILOL 12.5 MG/1
12.5 TABLET ORAL 2 TIMES DAILY WITH MEALS
Status: DISCONTINUED | OUTPATIENT
Start: 2018-08-31 | End: 2018-09-01 | Stop reason: HOSPADM

## 2018-08-31 RX ORDER — CEFAZOLIN SODIUM/WATER 2 G/20 ML
2 SYRINGE (ML) INTRAVENOUS EVERY 8 HOURS
Status: COMPLETED | OUTPATIENT
Start: 2018-08-31 | End: 2018-09-01

## 2018-08-31 RX ORDER — MIDAZOLAM HYDROCHLORIDE 1 MG/ML
0.5 INJECTION, SOLUTION INTRAMUSCULAR; INTRAVENOUS
Status: DISCONTINUED | OUTPATIENT
Start: 2018-08-31 | End: 2018-08-31 | Stop reason: HOSPADM

## 2018-08-31 RX ORDER — FENTANYL CITRATE 50 UG/ML
INJECTION, SOLUTION INTRAMUSCULAR; INTRAVENOUS AS NEEDED
Status: DISCONTINUED | OUTPATIENT
Start: 2018-08-31 | End: 2018-08-31 | Stop reason: HOSPADM

## 2018-08-31 RX ORDER — TRAMADOL HYDROCHLORIDE 50 MG/1
100 TABLET ORAL
Status: DISCONTINUED | OUTPATIENT
Start: 2018-08-31 | End: 2018-09-01 | Stop reason: HOSPADM

## 2018-08-31 RX ORDER — ONDANSETRON 2 MG/ML
4 INJECTION INTRAMUSCULAR; INTRAVENOUS
Status: DISCONTINUED | OUTPATIENT
Start: 2018-08-31 | End: 2018-09-01 | Stop reason: HOSPADM

## 2018-08-31 RX ORDER — SODIUM CHLORIDE 9 MG/ML
125 INJECTION, SOLUTION INTRAVENOUS CONTINUOUS
Status: DISPENSED | OUTPATIENT
Start: 2018-08-31 | End: 2018-09-01

## 2018-08-31 RX ORDER — MELATONIN
1000
Status: DISCONTINUED | OUTPATIENT
Start: 2018-08-31 | End: 2018-09-01 | Stop reason: HOSPADM

## 2018-08-31 RX ORDER — NALOXONE HYDROCHLORIDE 0.4 MG/ML
0.4 INJECTION, SOLUTION INTRAMUSCULAR; INTRAVENOUS; SUBCUTANEOUS AS NEEDED
Status: DISCONTINUED | OUTPATIENT
Start: 2018-08-31 | End: 2018-09-01 | Stop reason: HOSPADM

## 2018-08-31 RX ORDER — SUCCINYLCHOLINE CHLORIDE 20 MG/ML
INJECTION INTRAMUSCULAR; INTRAVENOUS AS NEEDED
Status: DISCONTINUED | OUTPATIENT
Start: 2018-08-31 | End: 2018-08-31 | Stop reason: HOSPADM

## 2018-08-31 RX ORDER — ROPIVACAINE HYDROCHLORIDE 5 MG/ML
30 INJECTION, SOLUTION EPIDURAL; INFILTRATION; PERINEURAL ONCE
Status: DISCONTINUED | OUTPATIENT
Start: 2018-08-31 | End: 2018-08-31 | Stop reason: HOSPADM

## 2018-08-31 RX ORDER — ACETAMINOPHEN 10 MG/ML
INJECTION, SOLUTION INTRAVENOUS AS NEEDED
Status: DISCONTINUED | OUTPATIENT
Start: 2018-08-31 | End: 2018-08-31 | Stop reason: HOSPADM

## 2018-08-31 RX ORDER — ONDANSETRON 2 MG/ML
INJECTION INTRAMUSCULAR; INTRAVENOUS AS NEEDED
Status: DISCONTINUED | OUTPATIENT
Start: 2018-08-31 | End: 2018-08-31 | Stop reason: HOSPADM

## 2018-08-31 RX ORDER — FENTANYL CITRATE 50 UG/ML
25 INJECTION, SOLUTION INTRAMUSCULAR; INTRAVENOUS
Status: DISCONTINUED | OUTPATIENT
Start: 2018-08-31 | End: 2018-08-31 | Stop reason: HOSPADM

## 2018-08-31 RX ORDER — FENTANYL CITRATE 50 UG/ML
50 INJECTION, SOLUTION INTRAMUSCULAR; INTRAVENOUS AS NEEDED
Status: DISCONTINUED | OUTPATIENT
Start: 2018-08-31 | End: 2018-08-31 | Stop reason: HOSPADM

## 2018-08-31 RX ADMIN — EPHEDRINE SULFATE 10 MG: 50 INJECTION, SOLUTION INTRAVENOUS at 12:01

## 2018-08-31 RX ADMIN — EPHEDRINE SULFATE 5 MG: 50 INJECTION, SOLUTION INTRAVENOUS at 12:04

## 2018-08-31 RX ADMIN — HYDROMORPHONE HYDROCHLORIDE 0.5 MG: 2 INJECTION, SOLUTION INTRAMUSCULAR; INTRAVENOUS; SUBCUTANEOUS at 11:53

## 2018-08-31 RX ADMIN — EPHEDRINE SULFATE 10 MG: 50 INJECTION, SOLUTION INTRAVENOUS at 11:47

## 2018-08-31 RX ADMIN — SODIUM CHLORIDE, SODIUM LACTATE, POTASSIUM CHLORIDE, CALCIUM CHLORIDE: 600; 310; 30; 20 INJECTION, SOLUTION INTRAVENOUS at 11:23

## 2018-08-31 RX ADMIN — DEXAMETHASONE SODIUM PHOSPHATE 4 MG: 4 INJECTION, SOLUTION INTRA-ARTICULAR; INTRALESIONAL; INTRAMUSCULAR; INTRAVENOUS; SOFT TISSUE at 11:40

## 2018-08-31 RX ADMIN — LIDOCAINE HYDROCHLORIDE 40 MG: 20 INJECTION, SOLUTION EPIDURAL; INFILTRATION; INTRACAUDAL; PERINEURAL at 11:29

## 2018-08-31 RX ADMIN — ACETAMINOPHEN 1000 MG: 10 INJECTION, SOLUTION INTRAVENOUS at 12:41

## 2018-08-31 RX ADMIN — ROCURONIUM BROMIDE 5 MG: 10 INJECTION, SOLUTION INTRAVENOUS at 11:29

## 2018-08-31 RX ADMIN — FENTANYL CITRATE 100 MCG: 50 INJECTION, SOLUTION INTRAMUSCULAR; INTRAVENOUS at 11:29

## 2018-08-31 RX ADMIN — SUCCINYLCHOLINE CHLORIDE 150 MG: 20 INJECTION INTRAMUSCULAR; INTRAVENOUS at 11:29

## 2018-08-31 RX ADMIN — VITAMIN D, TAB 1000IU (100/BT) 1000 UNITS: 25 TAB at 20:10

## 2018-08-31 RX ADMIN — KETOROLAC TROMETHAMINE 15 MG: 30 INJECTION, SOLUTION INTRAMUSCULAR at 17:09

## 2018-08-31 RX ADMIN — Medication 2 G: at 11:49

## 2018-08-31 RX ADMIN — HYDROMORPHONE HYDROCHLORIDE 0.25 MG: 2 INJECTION, SOLUTION INTRAMUSCULAR; INTRAVENOUS; SUBCUTANEOUS at 12:50

## 2018-08-31 RX ADMIN — ONDANSETRON 4 MG: 2 INJECTION INTRAMUSCULAR; INTRAVENOUS at 11:53

## 2018-08-31 RX ADMIN — ACETAMINOPHEN 1000 MG: 500 TABLET ORAL at 18:00

## 2018-08-31 RX ADMIN — Medication 2 G: at 17:10

## 2018-08-31 RX ADMIN — SODIUM CHLORIDE, SODIUM LACTATE, POTASSIUM CHLORIDE, AND CALCIUM CHLORIDE 125 ML/HR: 600; 310; 30; 20 INJECTION, SOLUTION INTRAVENOUS at 10:20

## 2018-08-31 RX ADMIN — HYDROMORPHONE HYDROCHLORIDE 0.25 MG: 2 INJECTION, SOLUTION INTRAMUSCULAR; INTRAVENOUS; SUBCUTANEOUS at 13:04

## 2018-08-31 RX ADMIN — SODIUM CHLORIDE, SODIUM LACTATE, POTASSIUM CHLORIDE, CALCIUM CHLORIDE: 600; 310; 30; 20 INJECTION, SOLUTION INTRAVENOUS at 13:08

## 2018-08-31 RX ADMIN — Medication 1 TABLET: at 17:10

## 2018-08-31 RX ADMIN — PROPOFOL 150 MG: 10 INJECTION, EMULSION INTRAVENOUS at 11:29

## 2018-08-31 RX ADMIN — CARVEDILOL 12.5 MG: 12.5 TABLET, FILM COATED ORAL at 17:11

## 2018-08-31 NOTE — PROGRESS NOTES
1600- Pt arrived to unit rolled pt onto side for skin check. Bleeding through onto gown and sheets. Changed dressing. 1700- Checked dressing, saturated. Changed dressing again. 36- Spoke to Lokesh On call, She said to just keep monitoring and to let her know if it happens again. .. Monitoring pt, VS stable.

## 2018-08-31 NOTE — IP AVS SNAPSHOT
[address] [facility] [address] [place] [address] 
[phone] Patient: David Lopez 
MRN: [id number] AEI:1/9/7202 A check clover indicates which time of day the medication should be taken. My Medications CONTINUE taking these medications Instructions Each Dose to Equal  
 Morning Noon Evening Bedtime  
 acetaminophen 500 mg tablet Commonly known as:  TYLENOL Your last dose was: Your next dose is: Take 1,000 mg by mouth daily as needed for Pain. 1000 mg  
    
   
   
   
  
 amLODIPine 10 mg tablet Commonly known as:  Randall Swain Your last dose was: Your next dose is: Take 1 Tab by mouth daily. Indications: hypertension 10 mg  
    
   
   
   
  
 aspirin 81 mg chewable tablet Your last dose was: Your next dose is: Take 1 Tab by mouth nightly. 81 mg  
    
   
   
   
  
 atorvastatin 80 mg tablet Commonly known as:  LIPITOR Your last dose was: Your next dose is: TAKE ONE TABLET ONCE DAILY  
     
   
   
   
  
 carvedilol 12.5 mg tablet Commonly known as:  Macestephanie Jin Your last dose was: Your next dose is: Take 1 Tab by mouth two (2) times daily (with meals). 12.5 mg  
    
   
   
   
  
 cholecalciferol 1,000 unit Cap Commonly known as:  VITAMIN D3 Your last dose was: Your next dose is: Take  by mouth nightly. DULoxetine 60 mg capsule Commonly known as:  CYMBALTA Your last dose was: Your next dose is:    
   
   
 take 1 capsule by mouth once daily  
     
   
   
   
  
 lisinopril-hydroCHLOROthiazide 20-12.5 mg per tablet Commonly known as:  Sis Shearer Your last dose was: Your next dose is: TAKE TWO TABLETS ONCE DAILY  
     
   
   
   
  
 metoprolol succinate 100 mg tablet Commonly known as:  TOPROL-XL Your last dose was: Your next dose is: Take 100 mg by mouth daily.   
 100 mg

## 2018-08-31 NOTE — ANESTHESIA POSTPROCEDURE EVALUATION
Post-Anesthesia Evaluation and Assessment Patient: Christiana Willis MRN: [id number]  SSN: [id number] YOB: 1940  Age: 65 y.o. Sex: male Cardiovascular Function/Vital Signs Visit Vitals  /80  Pulse 77  Temp 36.7 °C (98.1 °F)  Resp 17  Ht 5' 8\" (1.727 m)  Wt 90.7 kg (200 lb)  SpO2 97%  BMI 30.41 kg/m2 Patient is status post general anesthesia for Procedure(s): REVISION L3-5 LUMBAR LAMINECTOMY. Nausea/Vomiting: None Postoperative hydration reviewed and adequate. Pain: 
Pain Scale 1: Numeric (0 - 10) (08/31/18 1419) Pain Intensity 1: 0 (08/31/18 1419) Managed Neurological Status:  
Neuro (WDL): Within Defined Limits (08/31/18 1419) Neuro LUE Motor Response: Purposeful (08/31/18 1330) LLE Motor Response: Purposeful (08/31/18 1330) RUE Motor Response: Purposeful (08/31/18 1330) RLE Motor Response: Purposeful (08/31/18 1330) At baseline Mental Status and Level of Consciousness: Arousable Pulmonary Status:  
O2 Device: Nasal cannula (08/31/18 1419) Adequate oxygenation and airway patent Complications related to anesthesia: None Post-anesthesia assessment completed. No concerns Signed By: Ariel Curran MD   
 August 31, 2018

## 2018-08-31 NOTE — ANESTHESIA PREPROCEDURE EVALUATION
Anesthetic History PONV Review of Systems / Medical History Patient summary reviewed, nursing notes reviewed and pertinent labs reviewed Pulmonary Within defined limits Neuro/Psych Within defined limits Cardiovascular Hypertension Dysrhythmias CAD 
 
 
  
GI/Hepatic/Renal 
  
GERD Endo/Other Arthritis Other Findings Physical Exam 
 
Airway Mallampati: II 
TM Distance: > 6 cm Neck ROM: normal range of motion Mouth opening: Normal 
 
 Cardiovascular Regular rate and rhythm,  S1 and S2 normal,  no murmur, click, rub, or gallop Dental 
No notable dental hx Pulmonary Breath sounds clear to auscultation Abdominal 
GI exam deferred Other Findings Anesthetic Plan ASA: 3 Anesthesia type: general 
 
 
 
 
Induction: Intravenous Anesthetic plan and risks discussed with: Patient

## 2018-08-31 NOTE — OP NOTES
[facility]  MR[facility] [id number]  : 1940  ACCOUNT #: [de-identified]   DATE OF SERVICE: 2018    PREOPERATIVE DIAGNOSES:  Recurrent lumbar stenosis L3-4, L4-5; post-laminectomy L4-5. POSTOPERATIVE DIAGNOSES:   Recurrent lumbar stenosis L3-4, L4-5; post-laminectomy L4-5. PROCEDURE PERFORMED:  Revision laminectomy L3-4, L4-5. SURGEON:  Angie Smith MD    ASSISTANT:  RUDDY Hanna     ANESTHESIA:  General    ESTIMATED BLOOD LOSS:  Minimal.    SPECIMENS REMOVED:  None    COMPLICATIONS:  None. IMPLANTS: None    INDICATIONS:  This is a pleasant gentleman who has had previous lumbar laminectomy L4-5 several years ago, now with stenosis at L3-4. Failed conservative treatment, understood the risks and benefits, elected to proceed. DESCRIPTION OF PROCEDURE:  Patient was identified, brought to the operative suite, underwent general anesthesia without difficulty. He was placed in the prone position on the Matt frame with all bony prominences well padded. Back was prepped and draped sterilely. Timeout performed. Incision was made in the midline after timeout. Dissection was carried down to expose the previous laminectomy site at L4-5. We carried this out proximally and the L3-4 interval was also identified, at which time we then confirmed on fluoroscopy and appropriate x-ray. Then, we started a revision laminectomy. We took down the remainder of the L4 lamina, carrying this proximally. This released the ligamentum at L3-4, undercut the L3 lamina, taking this proximally to release the ligamentum. We did bilateral partial facet resection as well as undercut superior articular process, as well as removing the major ligament for lateral recess and foraminal decompression bilaterally. Afterwards, a Mckeon ball probe could pass into the foramen without any neural compression. Wound was thoroughly irrigated. FloSeal placed. Patient returned to the PACU in stable condition after closure.       MD Damion Amador  D: 08/31/2018 12:51     T: 08/31/2018 14:13  JOB #: [id number]

## 2018-08-31 NOTE — BRIEF OP NOTE
BRIEF OPERATIVE NOTE Date of Procedure: 8/31/2018 Preoperative Diagnosis: STENOSIS Postoperative Diagnosis: STENOSIS Procedure(s): REVISION L3-5 LUMBAR LAMINECTOMY Surgeon(s) and Role: Yuan Dumont MD - Primary Surgical Assistant: Galdino Echavarria PA-C Surgical Staff: 
Circ-1: Steff Arana RN 
Circ-Intern: Kaylyn Looney RN Physician Assistant: NEGRA Lee Radiology Technician: Alex Schultz RT Scrub RN-1: Evelyn Bull RN Scrub RN-Relief: Ruy Vo RN Event Time In Incision Start  Incision Close 1300 Anesthesia: General  
Estimated Blood Loss: See full op note Specimens: * No specimens in log * Findings: Lumbar stenosis Complications: None Implants:  
Implant Name Type Inv. Item Serial No.  Lot No. [id number] No. Used Action MEMBRANE AMNIOTIC WND [id number] Jeny Montes   MEMBRANE AMNIOTIC WND [id number] -- [facility] [id number] [employer] [id number] N/A 1 Implanted

## 2018-08-31 NOTE — ROUTINE PROCESS
Primary Nurse Kane Burns and Cathy, RN performed a dual skin assessment on this patient No impairment noted Chandrakant score is 22

## 2018-08-31 NOTE — DISCHARGE INSTRUCTIONS
[facility] Dr. Ellie Mack  [phone]    After Hospital Care Plan:  Discharge Instructions Lumbar Laminectomy Surgery     Patient Name: Av Tomlinson Sr    Date of procedure: 8/31/2018  Date of discharge: 8/31/2018    Procedure: Procedure(s):  REVISION L3-5 LUMBAR LAMINECTOMY  PCP: Mick Amato MD    Follow up appointments  -Follow up with Dr. Ellie Mack in 2 weeks. Call [phone] to make an appointment as soon as you get home from the hospital.    [address]: _________________________   phone: ___________________  The agency will contact you to arrange dates/times for visits. Please call them if you do not hear from them within 24 hours after you are discharged  Physical therapy 3 times a week for 3 weeks  Nursing-initial assessment and as needed    When to call your Orthopaedic Surgeon:  -Signs of infection-if your incision is red; continues to have drainage; drainage has a foul odor or if you have a persistent fever over 101 degrees for 24 hours  -Nausea or vomiting, severe headache  -Loss of bowel or bladder function, inability to urinate  -Changes in sensation in your arms or legs (numbness, tingling, loss of color)  -Increased weakness-greater than before your surgery  -Severe pain or pain not relieved by medications  -Signs of a blood clot in your leg-calf pain, tenderness, redness, swelling of lower leg    When to call your Primary Care Physician:  -Concerns about medical conditions such as diabetes, high blood pressure, asthma, congestive heart failure  -Call if blood sugars are elevated, persistent headache or dizziness, coughing or congestion, constipation or diarrhea, burning with urination, abnormal heart rate    When to call 911 and go to the nearest emergency room:  -Acute onset of chest pain, shortness of breath, difficulty breathing    Activity  - You are going home a well person, be as active as possible. Your only exercise should be walking. Start with short frequent walks and increase your walking distance each day.  -Limit the amount of time you sit to 20-30 minute intervals. Sitting for prolonged periods of time will be uncomfortable for you following surgery.  -Do NOT lift anything over 5 pounds  -Do NOT do any straining, twisting or bending  -When you are in bed, you may lay on your back or on either side. Do NOT lie on your stomach    Brace  -If you have a back brace, you should wear your brace at all times when you are out of bed. Do not wear the brace while in bed or showering.  -Remember to always wear a cotton t-shirt underneath your brace.  -Do not bend or twist when your brace is off    Diet  -Resume usual diet; drink plenty of fluids; eat foods high in fiber  -It is important to have regular bowel movements. Pain medications may cause constipation. You may want to take a stool softener (such as Senokot-S or Colace) to prevent constipation.  -If constipation occurs, take a laxative (such as Dulcolax tablets, Milk of Magnesia, or a suppository). Laxatives should only be used if the above preventable measures have failed and you still have not had a bowel movement after three days. Driving  -You may not drive or return to work until instructed by your physician. However, you may ride in the car for short periods of time. Incision Care  Your incision has been closed with absorbable sutures and the Zipline skin closure system. This will assist with healing. The Erla Cumins is to remain on your incision for 2 weeks. A dry dressing (ABD and tape) will be placed over it and should be changed daily, for at least the first several days after your surgery. If you have no incisional drainage, you may leave the incision open to air if you wish, still leaving the Zipline in place. Please make sure to wash your hands prior to touching your dressing. You may take brief showers but do not run the water directly onto the wound.  After your shower, blot your incision dry with a soft towel and replace the dry dressing. Do not allow the tape to come in contact with the Zipline. Do not rub or apply any lotions or ointments to your incision site. Do not soak or scrub your wound. The Zipline dressing will be removed during your two week follow-up appointment. If you experience drainage leaking from underneath the Zipline or if it peels off before 2 weeks, please contact your orthopedic surgeons office. Showering  -You may shower in approximately 4 days after your surgery.    -Leave the dressing on during your shower. Do NOT allow the water to run directly onto your dressing. Once you get out of the shower, gently pat the dressing dry. -Reminder- your brace can be removed while showering. Remember to not bend or twist while your brace is off.    -Do not take a tub bath. Preventing blood clots  -You have been given T.E.D. stockings to wear. Continue to wear these for 7 days after your discharge. Put them on in the morning and take them off at night.    -They are used to increase your circulation and prevent blood clots from forming in your legs  -T. E.D. stockings can be machine washed, temperature not to exceed 160° F (71°C) and machine dried for 15 to 20 minutes, temperature not to exceed 250° F (121°C). Pain management  -Take pain medication as prescribed; decrease the amount you use as your pain lessens  -Do not wait until you are in extreme pain to take your medication.  -Avoid alcoholic beverages while taking pain medication    Pain Medication Safety  DO:  -Read the Medication Guide   -Take your medicine exactly as prescribed   -Store your medicine away from children and in a safe place   -Flush unused medicine down the toilet   -Call your healthcare provider for medical advice about side effects. You may report side effects to FDA at 7-852-FDA-3070.   -Please be aware that many medications contain Tylenol.   We do not want you to over medicate so please read the information below as a guide. Do not take more than 4 Grams of Tylenol in a 24 hour period. (There are 1000 milligrams in one Gram)                                                                                                                                                                                                                           Percocet contains 325 mg of Tylenol per tablet (do not take more than 12 tablets in 24 hours)  Lortab contains 500 mg of Tylenol per tablet (do not take more than 8 tablets in 24 hours)  Norco contains 325 mg of Tylenol per tablet (do not take more than 12 tablets in 24 hours). DO NOT:  -Do not give your medicine to others   -Do not take medicine unless it was prescribed for you   -Do not stop taking your medicine without talking to your healthcare provider   -Do not break, chew, crush, dissolve, or inject your medicine. If you cannot  swallow your medicine whole, talk to your healthcare provider.  -Do not drink alcohol while taking this medicine  -Do not take anti-inflammatory medications or aspirin unless instructed by your physician.

## 2018-08-31 NOTE — PERIOP NOTES
TRANSFER - OUT REPORT: 
 
Verbal report given to Guillermina(name) on [facility] Sr  being transferred to 537(unit) for routine post - op Report consisted of patients Situation, Background, Assessment and  
Recommendations(SBAR). Time Pre op antibiotic CSQRS:6536 Anesthesia Stop time: 1320 Villanueva Present on Transfer to floor:no Order for Villanueva on Chart:no Discharge Prescriptions with Chart:no Information from the following report(s) SBAR, Kardex, OR Summary, Procedure Summary, Intake/Output, MAR, Recent Results and Med Rec Status was reviewed with the receiving nurse. Opportunity for questions and clarification was provided. Is the patient on 02? YES 
     L/Min 2 Other Is the patient on a monitor? NO Is the nurse transporting with the patient? NO Surgical Waiting Area notified of patient's transfer from PACU? YES The following personal items collected during your admission accompanied patient upon transfer:  
Dental Appliance: Dental Appliances: None Vision: Visual Aid: None Hearing Aid:   
Jewelry:   
Clothing: Clothing:  (belongings sent to [employer]) Other Valuables:   
Valuables sent to safe:   
 
Clothes sent to floor.

## 2018-08-31 NOTE — IP AVS SNAPSHOT
[address] 
[phone] Patient: Deacon Madrigal 
MRN: [id number] GFW:6/3/2034 About your hospitalization You were admitted on:  August 31, 2018 You last received care in the:  [address] [place] [address] You were discharged on:  September 1, 2018 Why you were hospitalized Your primary diagnosis was:  Not on File Your diagnoses also included:  Spinal Stenosis Follow-up Information Follow up With Details Comments Contact Info Ivonne Rosenthal MD   [address] [place] [address] [address] [facility] [phone] [facility]   [phone] Your Scheduled Appointments Monday September 24, 2018  8:45 AM EDT  
LAB with LAB [facility] Internal Medicine [address]) [place] [phone] Monday October 01, 2018  8:45 AM EDT ROUTINE CARE with Ivonne Rosenthal MD  
[employer] Internal Medicine [facility] [place] [phone] Discharge Orders None A check clover indicates which time of day the medication should be taken. My Medications CONTINUE taking these medications Instructions Each Dose to Equal  
 Morning Noon Evening Bedtime  
 acetaminophen 500 mg tablet Commonly known as:  TYLENOL Your last dose was: Your next dose is: Take 1,000 mg by mouth daily as needed for Pain. 1000 mg  
    
   
   
   
  
 amLODIPine 10 mg tablet Commonly known as:  Goldy Chafe Your last dose was: Your next dose is: Take 1 Tab by mouth daily. Indications: hypertension 10 mg  
    
   
   
   
  
 aspirin 81 mg chewable tablet Your last dose was: Your next dose is: Take 1 Tab by mouth nightly.   
 81 mg  
    
   
   
   
  
 atorvastatin 80 mg tablet Commonly known as:  LIPITOR Your last dose was: Your next dose is: TAKE ONE TABLET ONCE DAILY  
     
   
   
   
  
 carvedilol 12.5 mg tablet Commonly known as:  Laymanuel Gomes Your last dose was: Your next dose is: Take 1 Tab by mouth two (2) times daily (with meals). 12.5 mg  
    
   
   
   
  
 cholecalciferol 1,000 unit Cap Commonly known as:  VITAMIN D3 Your last dose was: Your next dose is: Take  by mouth nightly. DULoxetine 60 mg capsule Commonly known as:  CYMBALTA Your last dose was: Your next dose is:    
   
   
 take 1 capsule by mouth once daily  
     
   
   
   
  
 lisinopril-hydroCHLOROthiazide 20-12.5 mg per tablet Commonly known as:  Shara Hodgkin Your last dose was: Your next dose is: TAKE TWO TABLETS ONCE DAILY  
     
   
   
   
  
 metoprolol succinate 100 mg tablet Commonly known as:  TOPROL-XL Your last dose was: Your next dose is: Take 100 mg by mouth daily. 100 mg Discharge Instructions [facility] Dr. Mejia Ventura [phone] After Hospital Care Plan:  Discharge Instructions Lumbar Laminectomy Surgery Patient Name: Luan Wallace Sr 
 
Date of procedure: 8/31/2018  Date of discharge: 8/31/2018 Procedure: Procedure(s): REVISION L3-5 LUMBAR LAMINECTOMY  PCP: Nelia Mandujano MD 
 
Follow up appointments 
-Follow up with Dr. Mejia Ventura in 2 weeks. Call [phone] to make an appointment as soon as you get home from the hospital. 
 
[address] [place], [place] [address]y: _________________________   phone: ___________________ The agency will contact you to arrange dates/times for visits. Please call them if you do not hear from them within 24 hours after you are discharged Physical therapy 3 times a week for 3 weeks Nursing-initial assessment and as needed When to call your Orthopaedic Surgeon: 
-Signs of infection-if your incision is red; continues to have drainage; drainage has a foul odor or if you have a persistent fever over 101 degrees for 24 hours 
-Nausea or vomiting, severe headache 
-Loss of bowel or bladder function, inability to urinate 
-Changes in sensation in your arms or legs (numbness, tingling, loss of color) -Increased weakness-greater than before your surgery 
-Severe pain or pain not relieved by medications 
-Signs of a blood clot in your leg-calf pain, tenderness, redness, swelling of lower leg When to call your Primary Care Physician: 
-Concerns about medical conditions such as diabetes, high blood pressure, asthma, congestive heart failure 
-Call if blood sugars are elevated, persistent headache or dizziness, coughing or congestion, constipation or diarrhea, burning with urination, abnormal heart rate When to call 911 and go to the nearest emergency room: 
-Acute onset of chest pain, shortness of breath, difficulty breathing Activity - You are going home a well person, be as active as possible. Your only exercise should be walking. Start with short frequent walks and increase your walking distance each day. 
-Limit the amount of time you sit to 20-30 minute intervals. Sitting for prolonged periods of time will be uncomfortable for you following surgery. 
-Do NOT lift anything over 5 pounds 
-Do NOT do any straining, twisting or bending 
-When you are in bed, you may lay on your back or on either side. Do NOT lie on your stomach Brace 
-If you have a back brace, you should wear your brace at all times when you are out of bed. Do not wear the brace while in bed or showering. 
-Remember to always wear a cotton t-shirt underneath your brace. 
-Do not bend or twist when your brace is off Diet -Resume usual diet; drink plenty of fluids; eat foods high in fiber 
-It is important to have regular bowel movements. Pain medications may cause constipation. You may want to take a stool softener (such as Senokot-S or Colace) to prevent constipation. 
-If constipation occurs, take a laxative (such as Dulcolax tablets, Milk of Magnesia, or a suppository). Laxatives should only be used if the above preventable measures have failed and you still have not had a bowel movement after three days. Driving 
-You may not drive or return to work until instructed by your physician. However, you may ride in the car for short periods of time. Incision Care Your incision has been closed with absorbable sutures and the Zipline skin closure system. This will assist with healing. The Vonne Hamming is to remain on your incision for 2 weeks. A dry dressing (ABD and tape) will be placed over it and should be changed daily, for at least the first several days after your surgery. If you have no incisional drainage, you may leave the incision open to air if you wish, still leaving the Zipline in place. Please make sure to wash your hands prior to touching your dressing. You may take brief showers but do not run the water directly onto the wound. After your shower, blot your incision dry with a soft towel and replace the dry dressing. Do not allow the tape to come in contact with the Zipline. Do not rub or apply any lotions or ointments to your incision site. Do not soak or scrub your wound. The Zipline dressing will be removed during your two week follow-up appointment. If you experience drainage leaking from underneath the Zipline or if it peels off before 2 weeks, please contact your orthopedic surgeons office. Showering 
-You may shower in approximately 4 days after your surgery.   
-Leave the dressing on during your shower.   Do NOT allow the water to run directly onto your dressing. Once you get out of the shower, gently pat the dressing dry. -Reminder- your brace can be removed while showering. Remember to not bend or twist while your brace is off.   
-Do not take a tub bath. Preventing blood clots 
-You have been given T.E.D. stockings to wear. Continue to wear these for 7 days after your discharge. Put them on in the morning and take them off at night.   
-They are used to increase your circulation and prevent blood clots from forming in your legs 
-T. E.D. stockings can be machine washed, temperature not to exceed 160° F (71°C) and machine dried for 15 to 20 minutes, temperature not to exceed 250° F (121°C). Pain management 
-Take pain medication as prescribed; decrease the amount you use as your pain lessens 
-Do not wait until you are in extreme pain to take your medication. 
-Avoid alcoholic beverages while taking pain medication Pain Medication Safety DO: 
-Read the Medication Guide  
-Take your medicine exactly as prescribed  
-Store your medicine away from children and in a safe place  
-Flush unused medicine down the toilet  
-Call your healthcare provider for medical advice about side effects. You may report side effects to FDA at 7-353-FDA-1742.  
-Please be aware that many medications contain Tylenol. We do not want you to over medicate so please read the information below as a guide. Do not take more than 4 Grams of Tylenol in a 24 hour period. (There are 1000 milligrams in one Gram) Percocet contains 325 mg of Tylenol per tablet (do not take more than 12 tablets in 24 hours) Lortab contains 500 mg of Tylenol per tablet (do not take more than 8 tablets in 24 hours) Norco contains 325 mg of Tylenol per tablet (do not take more than 12 tablets in 24 hours). DO NOT: 
-Do not give your medicine to others  
-Do not take medicine unless it was prescribed for you  
-Do not stop taking your medicine without talking to your healthcare provider  
-Do not break, chew, crush, dissolve, or inject your medicine. If you cannot  swallow your medicine whole, talk to your healthcare provider. 
-Do not drink alcohol while taking this medicine 
-Do not take anti-inflammatory medications or aspirin unless instructed by your physician. ACO Transitions of Care Introducing [employer] offers a voluntary care coordination program to provide high quality service and care to [facility] fee-for-service beneficiaries. Kenny Adamson was designed to help you enhance your health and well-being through the following services: ? Transitions of Care  support for individuals who are transitioning from one care setting to another (example: Hospital to home). ? Chronic and Complex Care Coordination  support for individuals and caregivers of those with serious or chronic illnesses or with more than one chronic (ongoing) condition and those who take a number of different medications. If you meet specific medical criteria, a [address] [place] [address] Rd may call you directly to coordinate your care with your primary care physician and your other care providers. For questions about the [facility] programs, please, contact your physicians office. For general questions or additional information about Accountable Care Organizations: 
Please visit www.medicare.gov/acos. html or call 1-800-MEDICARE (9-313.872.2474) TTY users should call 1-582.124.3616. Introducing [facility]!    
 [employer] introduces [place] patient portal. Now you can access parts of your medical record, email your doctor's office, and request medication refills online. 1. In your internet browser, go to https://[employer]. [employer] 2. Click on the First Time User? Click Here link in the Sign In box. You will see the New Member Sign Up page. 3. Enter your [employer] Access Code exactly as it appears below. You will not need to use this code after youve completed the sign-up process. If you do not sign up before the expiration date, you must request a new code. · [employer] Access Code: [email or web address] Expires: 9/30/2018 11:21 AM 
 
4. Enter the last four digits of your Social Security Number (xxxx) and Date of Birth (mm/dd/yyyy) as indicated and click Submit. You will be taken to the next sign-up page. 5. Create a [employer] ID. This will be your [employer] login ID and cannot be changed, so think of one that is secure and easy to remember. 6. Create a [employer] password. You can change your password at any time. 7. Enter your Password Reset Question and Answer. This can be used at a later time if you forget your password. 8. Enter your e-mail address. You will receive e-mail notification when new information is available in [address] 9. Click Sign Up. You can now view and download portions of your medical record. 10. Click the Download Summary menu link to download a portable copy of your medical information. If you have questions, please visit the Frequently Asked Questions section of the [employer] website. Remember, [employer] is NOT to be used for urgent needs. For medical emergencies, dial 911. Now available from your iPhone and Android! Introducing Jm King As a [facility] patient, I wanted to make you aware of our electronic visit tool called Jm King. [facility] 24/7 allows you to connect within minutes with a medical provider 24 hours a day, seven days a week via a mobile device or tablet or logging into a secure website from your computer. You can access [employer] from anywhere in the [place]. A virtual visit might be right for you when you have a simple condition and feel like you just dont want to get out of bed, or cant get away from work for an appointment, when your regular [facility] provider is not available (evenings, weekends or holidays), or when youre out of town and need minor care. Electronic visits cost only $49 and if the [facility] 24/7 provider determines a prescription is needed to treat your condition, one can be electronically transmitted to a nearby pharmacy*. Please take a moment to enroll today if you have not already done so. The enrollment process is free and takes just a few minutes. To enroll, please download the [employer] yadi to your tablet or phone, or visit [email or web address] to enroll on your computer. And, as an [address] [place] [address] patient with a [employer] account, the results of your visits will be scanned into your electronic medical record and your primary care provider will be able to view the scanned results. We urge you to continue to see your regular [facility] provider for your ongoing medical care. And while your primary care provider may not be the one available when you seek a [employer] virtual visit, the peace of mind you get from getting a real diagnosis real time can be priceless. For more information on [employer], view our Frequently Asked Questions (FAQs) at [email or web address]. Sincerely, 
 
Yo Shah MD 
Chief Medical Officer Chandan8 Ebony Mario *:  certain medications cannot be prescribed via [employer] Unresulted Labs-Please follow up with your PCP about these lab tests Order Current Status XR FLUOROSCOPY UNDER 60 MINUTES In process Providers Seen During Your Hospitalization Provider Specialty Primary office phone Moreno Gates MD Orthopedic Surgery [phone] Your Primary Care Physician (PCP) Primary Care Physician Office Phone Office Fax [place] [address] [phone] You are allergic to the following Allergen Reactions Percocet (Oxycodone-Acetaminophen) Other (comments) \"feel weird\" Recent Documentation Height Weight BMI Smoking Status 1.727 m 90.7 kg 30.41 kg/m2 Never Smoker Emergency Contacts Name Discharge Info Relation Home Work Mobile Donita Conte DISCHARGE CAREGIVER [3] Spouse [3] [phone] Patient Belongings The following personal items are in your possession at time of discharge: 
  Dental Appliances: None  Visual Aid: None   Hearing Aids/Status: Does not own         Clothing:  (belongings sent to [employer]) Please provide this summary of care documentation to your next provider. Signatures-by signing, you are acknowledging that this After Visit Summary has been reviewed with you and you have received a copy. Patient Signature:  ____________________________________________________________ Date:  ____________________________________________________________  
  
Meri Roque Provider Signature:  ____________________________________________________________ Date:  ____________________________________________________________

## 2018-09-01 VITALS
WEIGHT: 200 LBS | OXYGEN SATURATION: 97 % | HEIGHT: 68 IN | SYSTOLIC BLOOD PRESSURE: 168 MMHG | DIASTOLIC BLOOD PRESSURE: 76 MMHG | RESPIRATION RATE: 14 BRPM | HEART RATE: 62 BPM | TEMPERATURE: 98.6 F | BODY MASS INDEX: 30.31 KG/M2

## 2018-09-01 LAB
ANION GAP SERPL CALC-SCNC: 10 MMOL/L (ref 5–15)
BUN SERPL-MCNC: 19 MG/DL (ref 6–20)
BUN/CREAT SERPL: 18 (ref 12–20)
CALCIUM SERPL-MCNC: 7.4 MG/DL (ref 8.5–10.1)
CHLORIDE SERPL-SCNC: 112 MMOL/L (ref 97–108)
CO2 SERPL-SCNC: 21 MMOL/L (ref 21–32)
CREAT SERPL-MCNC: 1.03 MG/DL (ref 0.7–1.3)
GLUCOSE SERPL-MCNC: 105 MG/DL (ref 65–100)
HGB BLD-MCNC: 12.6 G/DL (ref 12.1–17)
POTASSIUM SERPL-SCNC: 3.8 MMOL/L (ref 3.5–5.1)
SODIUM SERPL-SCNC: 143 MMOL/L (ref 136–145)

## 2018-09-01 PROCEDURE — 77010033678 HC OXYGEN DAILY

## 2018-09-01 PROCEDURE — 97116 GAIT TRAINING THERAPY: CPT

## 2018-09-01 PROCEDURE — 74011250637 HC RX REV CODE- 250/637: Performed by: PHYSICIAN ASSISTANT

## 2018-09-01 PROCEDURE — 74011250636 HC RX REV CODE- 250/636: Performed by: PHYSICIAN ASSISTANT

## 2018-09-01 PROCEDURE — 97535 SELF CARE MNGMENT TRAINING: CPT

## 2018-09-01 PROCEDURE — 97165 OT EVAL LOW COMPLEX 30 MIN: CPT

## 2018-09-01 PROCEDURE — 97161 PT EVAL LOW COMPLEX 20 MIN: CPT

## 2018-09-01 PROCEDURE — 74011000250 HC RX REV CODE- 250: Performed by: PHYSICIAN ASSISTANT

## 2018-09-01 PROCEDURE — 94760 N-INVAS EAR/PLS OXIMETRY 1: CPT

## 2018-09-01 PROCEDURE — G8978 MOBILITY CURRENT STATUS: HCPCS

## 2018-09-01 PROCEDURE — 97530 THERAPEUTIC ACTIVITIES: CPT

## 2018-09-01 PROCEDURE — 85018 HEMOGLOBIN: CPT | Performed by: PHYSICIAN ASSISTANT

## 2018-09-01 PROCEDURE — 99218 HC RM OBSERVATION: CPT

## 2018-09-01 PROCEDURE — 80048 BASIC METABOLIC PNL TOTAL CA: CPT | Performed by: PHYSICIAN ASSISTANT

## 2018-09-01 PROCEDURE — G8979 MOBILITY GOAL STATUS: HCPCS

## 2018-09-01 PROCEDURE — G8988 SELF CARE GOAL STATUS: HCPCS

## 2018-09-01 PROCEDURE — 77030012893

## 2018-09-01 PROCEDURE — G8987 SELF CARE CURRENT STATUS: HCPCS

## 2018-09-01 PROCEDURE — 36415 COLL VENOUS BLD VENIPUNCTURE: CPT | Performed by: PHYSICIAN ASSISTANT

## 2018-09-01 RX ADMIN — DULOXETINE HYDROCHLORIDE 60 MG: 60 CAPSULE, DELAYED RELEASE ORAL at 09:39

## 2018-09-01 RX ADMIN — LISINOPRIL: 20 TABLET ORAL at 09:38

## 2018-09-01 RX ADMIN — ASPIRIN 81 MG: 81 TABLET, CHEWABLE ORAL at 09:39

## 2018-09-01 RX ADMIN — KETOROLAC TROMETHAMINE 15 MG: 30 INJECTION, SOLUTION INTRAMUSCULAR at 00:00

## 2018-09-01 RX ADMIN — CARVEDILOL 12.5 MG: 12.5 TABLET, FILM COATED ORAL at 09:38

## 2018-09-01 RX ADMIN — ATORVASTATIN CALCIUM 80 MG: 40 TABLET, FILM COATED ORAL at 09:38

## 2018-09-01 RX ADMIN — ACETAMINOPHEN 1000 MG: 500 TABLET ORAL at 06:16

## 2018-09-01 RX ADMIN — METOPROLOL SUCCINATE 100 MG: 50 TABLET, EXTENDED RELEASE ORAL at 09:38

## 2018-09-01 RX ADMIN — KETOROLAC TROMETHAMINE 15 MG: 30 INJECTION, SOLUTION INTRAMUSCULAR at 06:16

## 2018-09-01 RX ADMIN — Medication 5 ML: at 06:19

## 2018-09-01 RX ADMIN — POLYETHYLENE GLYCOL 3350 17 G: 17 POWDER, FOR SOLUTION ORAL at 09:37

## 2018-09-01 RX ADMIN — Medication 2 G: at 00:55

## 2018-09-01 RX ADMIN — AMLODIPINE BESYLATE 10 MG: 5 TABLET ORAL at 09:37

## 2018-09-01 RX ADMIN — ACETAMINOPHEN 1000 MG: 500 TABLET ORAL at 00:00

## 2018-09-01 RX ADMIN — KETOROLAC TROMETHAMINE 15 MG: 30 INJECTION, SOLUTION INTRAMUSCULAR at 13:39

## 2018-09-01 RX ADMIN — Medication 1 TABLET: at 09:38

## 2018-09-01 RX ADMIN — ACETAMINOPHEN 1000 MG: 500 TABLET ORAL at 13:39

## 2018-09-01 NOTE — PROGRESS NOTES
Problem: Falls - Risk of 
Goal: *Absence of Falls Document Zelalem Styles Fall Risk and appropriate interventions in the flowsheet. Outcome: Progressing Towards Goal 
Fall Risk Interventions: 
  
 
  
 
Medication Interventions: Patient to call before getting OOB, Teach patient to arise slowly History of Falls Interventions: Investigate reason for fall

## 2018-09-01 NOTE — PROGRESS NOTES
Problem: Mobility Impaired (Adult and Pediatric) Goal: *Acute Goals and Plan of Care (Insert Text) Physical Therapy Goals Initiated 9/1/2018 1. Patient will move from supine to sit and sit to supine , scoot up and down and roll side to side in bed with modified independence within 4 days. 2. Patient will perform sit to stand with modified independence within 4 days. 3. Patient will ambulate with modified independence for 350 feet with the least restrictive device within 4 days. 4. Patient will ascend/descend 4 stairs with 1 handrail(s) with modified independence within 4 days. 5. Patient will verbalize and demonstrate understanding of spinal precautions (No bending, lifting greater than 5 lbs, or twisting; log-roll technique; frequent repositioning as instructed) within 4 days. physical Therapy EVALUATION Patient: Mei Anderson (65 y.o. male) Date: 9/1/2018 Primary Diagnosis: STENOSIS Spinal stenosis Procedure(s) (LRB): 
REVISION L3-5 LUMBAR LAMINECTOMY (N/A) 1 Day Post-Op Precautions:   Back (brace OOB) ASSESSMENT : 
Based on the objective data described below, the patient presents with impaired higher level balance and decreased safety awareness following revision of L3-5 lumbar laminectomy POD 1. PTA patient was independent with mobility. He's overall SBA to supervision for transfers and gait. Patient requires constant verbal cues for adherence to back precautions with mobility. He ambulated around unit without AD and SBA, no overt LOB noted. Patient able to navigate 4 stairs with L railing and SBA. Patient remained OOB in chair at end of session with needs met. He is cleared for discharge from PT standpoint, RN aware. Recommending HHPT at discharge. Patient is cleared for discharge from PT standpoint:  YES [x]     NO [] Patient will benefit from skilled intervention to address the above impairments. Patients rehabilitation potential is considered to be Good Factors which may influence rehabilitation potential include:  
[]         None noted 
[]         Mental ability/status []         Medical condition 
[]         Home/family situation and support systems 
[x]         Safety awareness 
[]         Pain tolerance/management 
[]         Other: PLAN : 
Recommendations and Planned Interventions: 
[x]           Bed Mobility Training             [x]    Neuromuscular Re-Education 
[x]           Transfer Training                   []    Orthotic/Prosthetic Training 
[x]           Gait Training                         []    Modalities [x]           Therapeutic Exercises           []    Edema Management/Control 
[x]           Therapeutic Activities            [x]    Patient and Family Training/Education 
[]           Other (comment): Frequency/Duration: Patient will be followed by physical therapy  twice daily to address goals. Discharge Recommendations: Home Health Further Equipment Recommendations for Discharge: None SUBJECTIVE:  
Patient stated I feel so good.  OBJECTIVE DATA SUMMARY:  
HISTORY:   
Past Medical History:  
Diagnosis Date  [facility]  Arthritis  CAD (coronary artery disease) 12/24/2013  Cataract  Diverticulosis 9/22/2009  Dyslipidemia 9/22/2009  Hemorrhoids 9/22/2009  
 HTN 9/22/2009  Hypercholesterolemia  Impotence 9/22/2009  MI [facility]my[facility] infarction) ([facility] 12/2013 CABG X 3   
 Nausea & vomiting  Neuropathy 9/22/2009  Open angle glaucoma with borderline intraocular pressure  Vitamin D deficiency 9/22/2009 Past Surgical History:  
Procedure Laterality Date  CARDIAC SURG PROCEDURE UNLIST CABG X 3  
 ENDOSCOPY, COLON, DIAGNOSTIC  [id number]  HX CATARACT REMOVAL Bilateral 09/13/2017  HX CORONARY ARTERY BYPASS GRAFT  12/2013  
 triple  HX HERNIA REPAIR Bilateral   
 HX LITHOTRIPSY  [id number]  HX ORTHOPAEDIC    
 back surgery Prior Level of Function/Home Situation: independent Personal factors and/or comorbidities impacting plan of care:  
 
Home Situation Home Environment: Private residence # Steps to Enter: 4 Rails to Enter: Yes Hand Rails : Right One/Two Story Residence: One story Living Alone: No 
Support Systems: Family member(s) Patient Expects to be Discharged to[de-identified] Private residence Current DME Used/Available at Home: [employer], straight, Commode, bedside Tub or Shower Type: Tub/Shower combination EXAMINATION/PRESENTATION/DECISION MAKING:  
Critical Behavior: 
Neurologic State: Alert Orientation Level: Oriented X4 Cognition: Impulsive Safety/Judgement: Decreased awareness of need for assistance, Decreased insight into deficits Hearing: Auditory Auditory Impairment: None Hearing Aids/Status: Does not own Skin:   
Edema:  
Range Of Motion: 
AROM: Within functional limits PROM: Within functional limits Strength:   
Strength: Generally decreased, functional 
  
  
  
  
  
  
Tone & Sensation:  
Tone: Normal 
  
  
  
  
  
  
  
  
   
Coordination: 
Coordination: Within functional limits Vision:  
Tracking: Able to track stimulus in all quadrants w/o difficulty Visual Fields:  (Appears intact) Acuity:  (Appears intact) Functional Mobility: 
Bed Mobility: 
Rolling: Supervision Supine to Sit: Supervision Transfers: 
Sit to Stand: Stand-by assistance Stand to Sit: Stand-by assistance Stand Pivot Transfers: Stand-by assistance Bed to Chair: Stand-by assistance Balance:  
Sitting: Intact Standing: Intact Ambulation/Gait Training: 
Distance (ft): 350 Feet (ft) Assistive Device: Gait belt;Brace/Splint Ambulation - Level of Assistance: Stand-by assistance Gait Abnormalities: Decreased step clearance Speed/Priscilla: Accelerated Step Length: Left shortened;Right shortened Stairs: 
Number of Stairs Trained: 4 Stairs - Level of Assistance: Stand-by assistance Rail Use: Left Therapeutic Exercises:  
 
 
Functional Measure: 
Timed up and go: 
 
Timed Get Up And Go Test: 12 Timed Up and Go and G-code impairment scale: 
Percentage of Impairment CH 
 
0% 
 CI 
 
1-19% CJ 
 
20-39% CK 
 
40-59% CL 
 
60-79% CM 
 
80-99% CN  
 
100% Timed Score 0-56 10 11-12 13-14 15-16 17-18 19 20  
 
 
< than 10 seconds=Normal  Greater then 13.5 seconds (in elderly)=Increased fall risk Graciela LARA. Predicting the probability for falls in community dwelling older adults using the Timed Up and Go Test. Phys Ther. 2000;80:896-903. G codes: In compliance with CMSs Claims Based Outcome Reporting, the following G-code set was chosen for this patient based on their primary functional limitation being treated: The outcome measure chosen to determine the severity of the functional limitation was the TUG with a score of 12 seconds which was correlated with the impairment scale. ? Mobility - Walking and Moving Around:  
  - CURRENT STATUS: CI - 1%-19% impaired, limited or restricted  - GOAL STATUS: CH - 0% impaired, limited or restricted  - D/C STATUS:  ---------------To be determined---------------  
  
 
 
Pain: 
Pain Scale 1: Numeric (0 - 10) Pain Intensity 1: 2 Pain Location 1: Back Pain Orientation 1: Mid;Lower Pain Description 1: Sore Pain Intervention(s) 1: Declines (just wants the scheduled tylenol) Activity Tolerance: VSS Please refer to the flowsheet for vital signs taken during this treatment. After treatment:  
[x]         Patient left in no apparent distress sitting up in chair 
[]         Patient left in no apparent distress in bed 
[x]         Call bell left within reach [x]         Nursing notified 
[]         Caregiver present 
[]         Bed alarm activated COMMUNICATION/EDUCATION:  
 The patients plan of care was discussed with: Registered Nurse. [x]         Fall prevention education was provided and the patient/caregiver indicated understanding. [x]         Patient/family have participated as able in goal setting and plan of care. [x]         Patient/family agree to work toward stated goals and plan of care. []         Patient understands intent and goals of therapy, but is neutral about his/her participation. []         Patient is unable to participate in goal setting and plan of care. Thank you for this referral. 
Av Christie, PT Time Calculation: 30 mins

## 2018-09-01 NOTE — PROGRESS NOTES
Problem: Self Care Deficits Care Plan (Adult) Goal: *Acute Goals and Plan of Care (Insert Text) Occupational Therapy Goals Initiated 9/1/2018 1. Patient will perform bathing with modified independence using AE PRN within 7 days. 2.  Patient will perform toileting with modified independence using most appropriate DME within 7 days. 3.  Patient will simple home management at modified independence within 7 days. 4.  Patient will don/doff back brace at modified independence within 7 days. 5.  Patient will verbalize/demonstrate 3/3 back precautions during ADL tasks without cues within 7 days. Occupational Therapy EVALUATION Patient: Matt Pritchard (65 y.o. male) Date: 9/1/2018 Primary Diagnosis: STENOSIS Spinal stenosis Procedure(s) (LRB): 
REVISION L3-5 LUMBAR LAMINECTOMY (N/A) 1 Day Post-Op Precautions:   Back (brace OOB) ASSESSMENT : 
Pt was cleared for OT by RN. Pt received supine in bed and agreeable to OT. Reviewed 3/3 back precautions with pt. Based on the objective data described below, the patient presents with impulsivity, motivated to return to completing all tasks for self, decreased attention/concentration, back precautions s/p spinal surgery. Pt overall ADL performance SBA to independent. Pt overall functional transfer performance SBA to supervision level. Pt fit for quick draw brace and trained in donning during session. Pt Repeatedly trained to not attempt to transfer back to bed on his own while hospitalized. Pt trained to contact staff for assist with all functional transfers and demonstrated ability to use call bell independently. Pt provided with written back precautions training information to reinforce the provided training. [address] was recommended for safety at home post operatively. RN Christ Ormond alerted to client's need for supervision during mobility for safety (in unfamiliar hospital environment and IV tubing).  Patient will benefit from skilled intervention to address the above impairments. Patients rehabilitation potential is considered to be Good Factors which may influence rehabilitation potential include:  
[x]             None noted []             Mental ability/status []             Medical condition []             Home/family situation and support systems []             Safety awareness []             Pain tolerance/management 
[]             Other: PLAN : 
Recommendations and Planned Interventions: 
[x]               Self Care Training                  [x]        Therapeutic Activities [x]               Functional Mobility Training    []        Cognitive Retraining 
[]               Therapeutic Exercises           [x]        Endurance Activities [x]               Balance Training                   []        Neuromuscular Re-Education []               Visual/Perceptual Training     [x]   Home Safety Training 
[x]               Patient Education                 [x]        Family Training/Education []               Other (comment): Frequency/Duration: Patient will be followed by occupational therapy 5 times a week to address goals. Discharge Recommendations: Home Health Further Equipment Recommendations for Discharge: None SUBJECTIVE:  
Patient stated I am just so excited because I have not been able to do this for so long.  OBJECTIVE DATA SUMMARY:  
HISTORY:  
Past Medical History:  
Diagnosis Date  [facility]  Arthritis  CAD (coronary artery disease) 12/24/2013  Cataract  Diverticulosis 9/22/2009  Dyslipidemia 9/22/2009  Hemorrhoids 9/22/2009  
 HTN 9/22/2009  Hypercholesterolemia  Impotence 9/22/2009  [facility]) [facility] 12/2013 CABG X 3   
 Nausea & vomiting  Neuropathy 9/22/2009  Open angle glaucoma with borderline intraocular pressure  Vitamin D deficiency 9/22/2009 Past Surgical History:  
Procedure Laterality Date  CARDIAC SURG PROCEDURE UNLIST CABG X 3  
 ENDOSCOPY, COLON, DIAGNOSTIC  [id number]  HX CATARACT REMOVAL Bilateral 09/13/2017  HX CORONARY ARTERY BYPASS GRAFT  12/2013  
 triple  HX HERNIA REPAIR Bilateral   
 HX LITHOTRIPSY  [id number]  HX ORTHOPAEDIC    
 back surgery Prior Level of Function/Environment/Context: Pt and wife reportedly reside in [facility]. Wife and children set up to assist pt. Pt reportedly enjoys physical labor (yard/tree work). Occupations in which the patient is/was successful, what are the barriers preventing that success:  
Performance Patterns (routines, roles, habits, and rituals):  
Personal Interests and/or values:  
Expanded or extensive additional review of patient history:  
 
Home Situation Home Environment: Private residence # Steps to Enter: 4 Rails to Enter: Yes Hand Rails : Right One/Two Story Residence: One story Living Alone: No 
Support Systems: Family member(s) Patient Expects to be Discharged to[de-identified] Private residence Current DME Used/Available at Home: [place] [employer], straight, Commode, bedside Tub or Shower Type: Tub/Shower combination Hand dominance: Right EXAMINATION OF PERFORMANCE DEFICITS: 
Cognitive/Behavioral Status: 
Neurologic State: Alert Orientation Level: Oriented X4 Cognition: Impulsive Perception: Appears intact Perseveration: No perseveration noted Safety/Judgement: Decreased awareness of need for assistance;Decreased insight into deficits Skin: Appears intact BUE Edema: No edema noted BUE Hearing: Auditory Auditory Impairment: None Hearing Aids/Status: Does not own Vision/Perceptual:   
Tracking: Able to track stimulus in all quadrants w/o difficulty Visual Fields:  (Appears intact) Acuity:  (Appears intact) Range of Motion: 
 
AROM: Within functional limits PROM: Within functional limits Strength: 
 
Strength: Generally decreased, functional 
  
 Coordination: 
Coordination: Within functional limits Tone & Sensation: 
 
Tone: Normal 
  
  
  
  
  
  
  
 
Balance: 
Sitting: Intact Standing: Intact Functional Mobility and Transfers for ADLs: 
Bed Mobility: 
Rolling: Supervision Supine to Sit: Supervision Transfers: 
Sit to Stand: Stand-by assistance Stand to Sit: Stand-by assistance Bed to Chair: Stand-by assistance Toilet Transfer : Stand-by assistance (Inferred from chair transfer) ADL Assessment: 
Feeding: Independent Oral Facial Hygiene/Grooming: Stand-by assistance (Standing sink level) Bathing: Stand-by assistance (Cuing for safe LB ADL) Upper Body Dressing: Setup Lower Body Dressing: Stand-by assistance; Additional time (Seated Tailor sit) Toileting: Stand by assistance ADL Intervention and task modifications: 
  
 
  Patient instructed and indicated understanding the benefits of maintaining activity tolerance, functional mobility, and independence with self care tasks during acute stay  to ensure safe return home and to baseline. Encouraged patient to increase frequency and duration OOB, not sitting longer than 30 mins without marching/walking with staff, be out of bed for all meals, perform daily ADLs (as approved by RN/MD regarding bathing etc), and performing functional mobility to/from bathroom. Patient instruction and indicated understanding on body mechanics, ergonomics and gravitational force on the spine during different body positions to plan activities in prep for return home to complete basic ADLs, instrumental ADLs and back to work safely. Patient instructed and demonstrated while supine hip ER stretch and hold 10 seconds to increase ROM in prep for lower body ADLs daily.  
Bathing: Patient instructed and indicated understanding when bathing to not submerge wound in water, stand to shower or sponge bathe, cover wound with plastic and tape to ensure no water reaches bandage/wound without cues. Dressing brace: Patient instructed and demonstrated to don/doff velcro on brace using dominant side, keeping non-dominant side intact. Patient instructed and demonstrated in meantime of being able to stand with back against wall to don/doff brace, to don/doff seated using lap and bed/chair surface to support brace while manipulating. Dressing lower body: Patient instructed to don brace first and on the benefits to remain seated to don all clothing to increase independence with precautions and pain management. Patient instructed and demonstrated tailor sitting for lower body dressing. Toileting: Patient instructed on the benefits of using flushable wet wipes and toilet tongs if decreased reach or pain for patricia care. Also, the benefits of a reacher to aid in clothing management. Patient instruction and indicated understanding to not strain i.e. holding breath to bear down during a bowel movement, lifting/activity, and sexual activity. Home safety: Patient instructed and indicated understanding on home modifications and safety [raise height of ADL objects (i.e. clothing, sink items, fridge items, items to mouth when grooming), appropriate height of chair surfaces, recliner safety, change of floor surfaces, clear pathways] to increase independence and fall prevention. Standing: Patient instructed and indicated understanding to walk up to sink/counter top/surfaces, step into walker, square off while using objects, slide objects along surfaces, to increase adherence to back precautions and fall prevention. Patient instructed to increase amount of time standing in order to increase independence and tolerance with ADLs. During prolonged standing, can open cabinet door or place foot on stool to decrease spinal pressure/increase pain.   
Tub transfer: Patient instructed and indicated understanding regarding when it is safe to begin transfer into tub (complete stairs with PT, advance exercises with PT high enough to clear tub height, and while clothes donned practice with another person present). Cognitive Retraining Safety/Judgement: Decreased awareness of need for assistance;Decreased insight into deficits Functional Measure: 
Barthel Index: 
 
Bathin Bladder: 10 Bowels: 10 
Groomin Dressin Feeding: 10 Mobility: 10 Stairs: 5 Toilet Use: 10 Transfer (Bed to Chair and Back): 10 Total: 75 Barthel and G-code impairment scale: 
Percentage of impairment CH 
0% CI 
1-19% CJ 
20-39% CK 
40-59% CL 
60-79% CM 
80-99% CN 
100% Barthel Score 0-100 100 99-80 79-60 59-40 20-39 1-19 
 0 Barthel Score 0-20 20 17-19 13-16 9-12 5-8 1-4 0 The Barthel ADL Index: Guidelines 1. The index should be used as a record of what a patient does, not as a record of what a patient could do. 2. The main aim is to establish degree of independence from any help, physical or verbal, however minor and for whatever reason. 3. The need for supervision renders the patient not independent. 4. A patient's performance should be established using the best available evidence. Asking the patient, friends/relatives and nurses are the usual sources, but direct observation and common sense are also important. However direct testing is not needed. 5. Usually the patient's performance over the preceding 24-48 hours is important, but occasionally longer periods will be relevant. 6. Middle categories imply that the patient supplies over 50 per cent of the effort. 7. Use of aids to be independent is allowed. Luis Rajput., Barthel, D.W. ([id number]). Functional evaluation: the Barthel Index. [address] [facility] (14)2. Susan Landis lorne CAMILLE Martin, Pillo Beckwith., Liss Pringle., Emma, [address] ().  Measuring the change indisability after inpatient rehabilitation; comparison of the responsiveness of the Barthel Index and Functional [place] Measure. Journal of Neurology, Neurosurgery, and Psychiatry, 66(4), 806-701. DEVEN Mesa, HERBERTH Anthony, & Deisy Dominguez M.A. (2004.) Assessment of post-stroke quality of life in cost-effectiveness studies: The usefulness of the Barthel Index and the EuroQoL-5D. [facility], 13, 786-11 G codes: In compliance with CMSs Claims Based Outcome Reporting, the following G-code set was chosen for this patient based on their primary functional limitation being treated: The outcome measure chosen to determine the severity of the functional limitation was the Barthel Index with a score of 75/100 which was correlated with the impairment scale. ? Self Care:  
  - CURRENT STATUS: CJ - 20%-39% impaired, limited or restricted  - GOAL STATUS: CH - 0% impaired, limited or restricted  - D/C STATUS: ---------------To be determined--------------- Occupational Therapy Evaluation Charge Determination History Examination Decision-Making LOW Complexity : Brief history review  MEDIUM Complexity : 3-5 performance deficits relating to physical, cognitive , or psychosocial skils that result in activity limitations and / or participation restrictions MEDIUM Complexity : Patient may present with comorbidities that affect occupational performnce. Miniml to moderate modification of tasks or assistance (eg, physical or verbal ) with assesment(s) is necessary to enable patient to complete evaluation Based on the above components, the patient evaluation is determined to be of the following complexity level: LOW Pain: 
Pain Scale 1: Numeric (0 - 10) Pain Intensity 1: 2 Pain Location 1: Back Pain Orientation 1: Mid;Lower Pain Description 1: Sore Pain Intervention(s) 1: Declines (just wants the scheduled tylenol) Activity Tolerance:  
Pt declined dizziness and dyspnea during session. After treatment:  
[x] Patient left in no apparent distress sitting up in chair 
[] Patient left in no apparent distress in bed 
[x] Call bell left within reach [x] Nursing notified 
[] Caregiver present 
[] Bed alarm activated COMMUNICATION/EDUCATION:  
The patients plan of care was discussed with: Physical Therapist and Registered Nurse. [x] Home safety education was provided and the patient/caregiver indicated understanding. [x] Patient/family have participated as able in goal setting and plan of care. [x] Patient/family agree to work toward stated goals and plan of care. [] Patient understands intent and goals of therapy, but is neutral about his/her participation. [] Patient is unable to participate in goal setting and plan of care. This patients plan of care is appropriate for delegation to [facility]. Thank you for this referral. 
Libertad Hernandez Time Calculation: 30 mins

## 2018-09-01 NOTE — PROGRESS NOTES
Orthopaedics Daily Progress Note Date of Surgery:  8/31/2018 Patient: Alexsandra Leigh 
 YOB: 1940  Age: 65 y.o. SUBJECTIVE:  
1 Day Post-Op following REVISION L3-5 LUMBAR LAMINECTOMY. The patient's post operative pain is controlled. No CP/SOB. No N/V. The patient's mobility will be evaluated today during PT sessions. Patient resting comfortably in bed, states \"I already feel better than yesterday\". There was concern about the postop dressing yesterday, but it was reinforced and has had no issues overnight. OBJECTIVE:  
 
Vital Signs:   
 
Visit Vitals  /86  Pulse 76  Temp 98.7 °F (37.1 °C)  Resp 16  
 Ht 5' 8\" (1.727 m)  Wt 90.7 kg (200 lb)  SpO2 96%  BMI 30.41 kg/m2 Physical Exam: 
General: A&Ox3. The patient is cooperative, and in no acute distress. Respiratory: Respirations are unlabored. Surgical site(s): dressing clean, dry Musculoskeletal: Calves are soft, supple, and non-tender upon palpation. Motor 5/5. Neurological:  Neurovascularly intact with good dorsi and plantar flexion. Pulses symmetrical. 
 
Laboratory Values:            
Recent Results (from the past 12 hour(s)) METABOLIC PANEL, BASIC Collection Time: 09/01/18  4:02 AM  
Result Value Ref Range Sodium 143 136 - 145 mmol/L Potassium 3.8 3.5 - 5.1 mmol/L Chloride 112 (H) 97 - 108 mmol/L  
 CO2 21 21 - 32 mmol/L Anion gap 10 5 - 15 mmol/L Glucose 105 (H) 65 - 100 mg/dL BUN 19 6 - 20 MG/DL Creatinine 1.03 0.70 - 1.30 MG/DL  
 BUN/Creatinine ratio 18 12 - 20 GFR est AA >60 >60 ml/min/1.73m2 GFR est non-AA >60 >60 ml/min/1.73m2 Calcium 7.4 (L) 8.5 - 10.1 MG/DL  
HEMOGLOBIN Collection Time: 09/01/18  4:02 AM  
Result Value Ref Range HGB 12.6 12.1 - 17.0 g/dL PLAN:  
 
S/P REVISION L3-5 LUMBAR LAMINECTOMY -Continue WBAT. -Mobilize and continue with PT/OT until discharged Hemodynamics Hgb today is 12.6. Acute blood loss anemia as expected. Patient asymptomatic. Continue to monitor. Wound Monitor postop dressing; no postop dressing changes necessary. Reinforce PRN. Post Operative Pain Pain Control: stable, mild-to-moderate joint symptoms intermittently, reasonably well controlled by current meds. DVT Prophylaxis Continue with SCD'S, Ankle Pump Exercises. ASA daily Discharge Disposition Discharge plan: Home pending PT clearance and pain control. Signed By: Roro Sevilla PA-C September 1, 2018 8:45 AM

## 2018-09-01 NOTE — PROGRESS NOTES
Removed IV with tip intact. Script for tramadol to be called to pt's pharmacy by PA/MD on call. Changed dressing while educating spouse on changing dressing. Bag of dressing supplies given. Reviewed discharge instructions and follow up information. Opportunity for questions given. Pt discharged for home with home health in care of his wife with all belongings.

## 2018-09-01 NOTE — PROGRESS NOTES
Orders written for home health, met with patient at the bedside to discuss. Patient would like to use [facility] [phone], [phone]. Cm contacted them and they can see patient, no later than Tuesday.  Cm faxed them the information and follow up has been placed on AVS. 
[id number] [facility]

## 2018-09-04 ENCOUNTER — PATIENT OUTREACH (OUTPATIENT)
Dept: INTERNAL MEDICINE CLINIC | Facility: CLINIC | Age: 78
End: 2018-09-04

## 2018-09-04 NOTE — PROGRESS NOTES
Hospital Discharge Follow-Up Date/Time:  9/5/2018 3:58 PM 
 
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 The physician discharge summary was not available at the time of outreach. Patient was contacted within 2 business days of discharge. Top Challenges reviewed with the provider - D/C Hgb 12.6. D/C'd home w/ HH- SN,PT. SN initial visit on 9/3/18. PT on 9/4/28. - Per [place] nurse med rec done. Meds in home. Pt knowledgeable about meds. Dsg changed by wife daily. Ambulating w/ cane. Pain well managed. Next visit 9/6/18. 
 
- Per Ortho office 2 week f/u appt 9/17/18 w/ PA. Subsequent f/i appt on 10/16/18 @ 10:30 AM w/ Dr Colette Centeno. - Previously scheduled f/u on 10/1/18 Method of communication with provider :chart routing Inpatient RRAT score: not available Was this a readmission? no  
Patient stated reason for the readmission: n/a Nurse Navigator (NN) attempted to contact pt. Message left. Pt returned call on 9/5/18. Left message. NN 2nd attempt to contact pt. Message left. Disease Specific:   N/A Summary of patient's top problems: 1. 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 [facility] visit on 9/3/18. Ortho f/u in 2 weeks. Appt scheduled. Daily dsg change as per [address]. Pt amb w/ cane. Pain controlled. Improved since surgery. 2. ACP- not file. Given paperwork 2016. F/U during 10/1/18 ov. Home Health orders at discharge: PT, SN, prior history with non [facility] Home Health company: [facility] [address] [place] [address] Date of initial visit: 9/3/18- SN SOC Durable Medical Equipment ordered/company: none Durable Medical Equipment received: n/a Advance Care Planning:  
Does patient have an Advance Directive:  not on file per orders EMR given paperwork in 2016. Medication(s):  
New Medications at Discharge: none Changed Medications at Discharge: none Discontinued Medications at Discharge: none Medication reconciliation was performed with patient  By [facility] nurse. As per [facility] nurse pt verbalized understanding of administration of home medications. There were no barriers to obtaining medications identified at this time. Referral to Pharm D needed: no  
 
Current Outpatient Prescriptions Medication Sig  
 lisinopril-hydroCHLOROthiazide (PRINZIDE, ZESTORETIC) 20-12.5 mg per tablet take 2 tablet by mouth once daily  carvedilol (COREG) 12.5 mg tablet Take 1 Tab by mouth two (2) times daily (with meals).  DULoxetine (CYMBALTA) 60 mg capsule take 1 capsule by mouth once daily  atorvastatin (LIPITOR) 80 mg tablet TAKE ONE TABLET ONCE DAILY  amLODIPine (NORVASC) 10 mg tablet Take 1 Tab by mouth daily. Indications: hypertension  acetaminophen (TYLENOL) 500 mg tablet Take 1,000 mg by mouth daily as needed for Pain.  aspirin 81 mg chewable tablet Take 1 Tab by mouth nightly.  cholecalciferol (VITAMIN D3) 1,000 unit cap Take  by mouth nightly. No current facility-administered medications for this visit. There are no discontinued medications. [facility] follow up appointment(s):  
Future Appointments Date Time Provider Elia Arce 9/24/2018 8:45 AM LAB [facility] WINDYMA JORDON CHAUDHARY  
10/1/2018 8:45 AM Brea Shi  W. Diana Schulte [facility] follow up appointment(s): 9/17/18 & 10/16/18 @ [facility]  Transitions of Care- collaboration & care coordination to prevent complications post hospitalization. 9/5/18- message left by pt. NN call back. Message left. NN spoke w/ [facility] nurse. Med rec done. Meds in home. Pt knowledgeable about meds. Pain controlled. Improved since surgery. Amb w/ cane. Daily dsg change by wife. Next [facility] SN visit 9/6/18. PT following also. NN spoke w/ Ortho office. Confirmed post hosp f/u appts scheduled x2. PCP f/u previously scheduled for 10/1/18. Plan- NN to collaborate w/ pt, [facility] staff, PCP, & other Care Team Members as needed for care coordination. Next NN f/u ~ 1 week-ID. 9/4/28- attempted to contact pt. Message left. Spoke w/ [facility] RN . Confirmed pt seen by SN 9/3/18 & PT 9/4/28. Unable to provide NN contact information as [facility] RN driving. Requested NN call office 9/5/18 after 9 AM. Plan- NN to f/u w/ pt & [facility] office on 9/5/18-ID.

## 2018-09-05 DIAGNOSIS — I12.9 BENIGN HYPERTENSION WITH CHRONIC KIDNEY DISEASE, STAGE III (HCC): ICD-10-CM

## 2018-09-05 DIAGNOSIS — N18.30 BENIGN HYPERTENSION WITH CHRONIC KIDNEY DISEASE, STAGE III (HCC): ICD-10-CM

## 2018-09-05 RX ORDER — LISINOPRIL AND HYDROCHLOROTHIAZIDE 12.5; 2 MG/1; MG/1
TABLET ORAL
Qty: 60 TAB | Refills: 11 | Status: SHIPPED | OUTPATIENT
Start: 2018-09-05 | End: 2019-04-11 | Stop reason: SDUPTHER

## 2018-09-05 RX ORDER — CARVEDILOL 12.5 MG/1
12.5 TABLET ORAL 2 TIMES DAILY WITH MEALS
Qty: 60 TAB | Refills: 11 | Status: SHIPPED | OUTPATIENT
Start: 2018-09-05 | End: 2019-04-11 | Stop reason: SDUPTHER

## 2018-09-05 RX ORDER — CARVEDILOL 25 MG/1
TABLET ORAL
Qty: 30 TAB | Refills: 0 | OUTPATIENT
Start: 2018-09-05

## 2018-09-05 NOTE — TELEPHONE ENCOUNTER
He should not be taking metoprolol and carvedilol. Where did metoprolol come from? It appeared on med list in March. Should stop metoprolol and continue carvedilol 12.5 mg twice daily. I sent 12.5 mg tablets so he will not need to break the 25 mg tablets.

## 2018-09-05 NOTE — TELEPHONE ENCOUNTER
Spoke with [employer], Metoprolol is not a current medication as it has not been filled in over a year. [facility] has been giving the Carvedilol 12.5.

## 2018-09-12 ENCOUNTER — PATIENT OUTREACH (OUTPATIENT)
Dept: INTERNAL MEDICINE CLINIC | Facility: CLINIC | Age: 78
End: 2018-09-12

## 2018-09-12 NOTE — PROGRESS NOTES
NN F/U Progress Note Goals Addressed  Transitions of Care- collaboration & care coordination to prevent complications post hospitalization. 9/12/18- spoke w/ pt. Reported [place] nurse there now changing dsg. No drainage today. [facility] PT visits 2x/week. Reported since surgery no more pain on left side. Right side still w/ some pain. Wears back brace when moving. Ambulating w/ cane. Vdg w/o difficulty. +BMs w/ straining. Reported in hosp given stool softener & it worked well. Per EMR review was given Senna-Docusate 2x/day. Advised cna purchase OTC & start taking. Should avoid straining. Confirmed 9/17/18 Ortho f/u appt. Plan- [place] SN/PT to continue. Pt to attend Ortho appt on 9/17/18. Pt to obtain & start taking stool softener. NN to collaborate w/ pt, [facility] [place] staff & other Care Team Members as needed for care coordination. Next NN f/u ~ 1 week-ID. 9/5/18- message left by pt. NN call back. Message left. NN spoke w/ [place] nurse. Med rec done. Meds in home. Pt knowledgeable about meds. Pain controlled. Improved since surgery. Amb w/ cane. Daily dsg change by wife. Next [place] SN visit 9/6/18. PT following also. NN spoke w/ Ortho office. Confirmed post hosp f/u appts scheduled x2. PCP f/u previously scheduled for 10/1/18. Plan- NN to collaborate w/ pt, [place] staff, PCP, & other Care Team Members as needed for care coordination. Next NN f/u ~ 1 week-ID. 9/4/28- attempted to contact pt. Message left. Spoke w/ [facility] RN . Confirmed pt seen by SN 9/3/18 & PT 9/4/28. Unable to provide NN contact information as [facility] Morales RN driving. Requested NN call office 9/5/18 after 9 AM. Plan- NN to f/u w/ pt & [place] office on 9/5/18-ID.

## 2018-10-01 ENCOUNTER — OFFICE VISIT (OUTPATIENT)
Dept: INTERNAL MEDICINE CLINIC | Facility: CLINIC | Age: 78
End: 2018-10-01

## 2018-10-01 VITALS
RESPIRATION RATE: 16 BRPM | SYSTOLIC BLOOD PRESSURE: 138 MMHG | TEMPERATURE: 98.3 F | DIASTOLIC BLOOD PRESSURE: 72 MMHG | WEIGHT: 201.4 LBS | BODY MASS INDEX: 30.52 KG/M2 | HEART RATE: 69 BPM | OXYGEN SATURATION: 95 % | HEIGHT: 68 IN

## 2018-10-01 DIAGNOSIS — Z23 ENCOUNTER FOR IMMUNIZATION: ICD-10-CM

## 2018-10-01 DIAGNOSIS — E78.2 HYPERLIPIDEMIA, MIXED: ICD-10-CM

## 2018-10-01 DIAGNOSIS — E66.9 OBESITY (BMI 30.0-34.9): ICD-10-CM

## 2018-10-01 DIAGNOSIS — N18.30 BENIGN HYPERTENSION WITH CHRONIC KIDNEY DISEASE, STAGE III (HCC): Primary | ICD-10-CM

## 2018-10-01 DIAGNOSIS — I12.9 BENIGN HYPERTENSION WITH CHRONIC KIDNEY DISEASE, STAGE III (HCC): Primary | ICD-10-CM

## 2018-10-01 DIAGNOSIS — R73.02 GLUCOSE INTOLERANCE (IMPAIRED GLUCOSE TOLERANCE): ICD-10-CM

## 2018-10-01 DIAGNOSIS — I25.10 CORONARY ARTERY DISEASE INVOLVING NATIVE CORONARY ARTERY OF NATIVE HEART WITHOUT ANGINA PECTORIS: ICD-10-CM

## 2018-10-01 NOTE — PROGRESS NOTES
Chief Complaint Patient presents with  Cholesterol Problem  Hypertension 1. Have you been to the ER, urgent care clinic since your last visit? no Hospitalized since your last visit? Yes [facility] for back surgery 
nterested in the shingles or pneumonia vacine at this time. 2. Have you seen or consulted any other health care providers outside of the [address] [place] [address] since your last visit? Include any pap smears or colon screening. No  
 
Patient would like the flu shot today. He is not interested in shingles or pneumonia vacine today.

## 2018-10-01 NOTE — MR AVS SNAPSHOT
[address] [address] [phone] Patient: Ron Mast 
MRN: [id number] Visit Information Date & Time Provider Department Dept. Phone Encounter #  
 10/1/2018  8:45 AM Meg Pendleton MD [facility] [address] [id number] Follow-up Instructions Return in about 6 months (around 4/1/2019) for HTN, chol, gluc, CAD  Fasting lab one week prior . Upcoming Health Maintenance Date Due DTaP/Tdap/Td series (1 - Tdap) 9/6/1960 Shingrix Vaccine Age 50> (1 of 2) 9/6/1989 Influenza Age 5 to Adult 8/1/2018 MEDICARE YEARLY EXAM 3/30/2019 GLAUCOMA SCREENING Q2Y 7/25/2019 Allergies as of 10/1/2018  Review Complete On: 10/1/2018 By: Meg Pendleton MD  
  
 Severity Noted Reaction Type Reactions Percocet [Oxycodone-acetaminophen]  09/22/2009    Other (comments) \"feel weird\" Current Immunizations  Reviewed on 8/13/2018 Name Date Influenza High Dose Vaccine PF 9/29/2017, 9/14/2015 Influenza Vaccine (Quad) PF 9/9/2016 Influenza Vaccine (Tri) Adjuvanted  Incomplete Pneumococcal Conjugate (PCV-13) 3/12/2015 ZZZ-RETIRED (DO NOT USE) Pneumococcal Vaccine (Unspecified Type) 4/22/2010 Not reviewed this visit You Were Diagnosed With   
  
 Codes Comments Benign hypertension with chronic kidney disease, stage III    -  Primary ICD-10-CM: I12.9, N18.3 ICD-9-CM: 403.10, 585.3 Hyperlipidemia, mixed     ICD-10-CM: E78.2 ICD-9-CM: 272.2 Glucose intolerance (impaired glucose tolerance)     ICD-10-CM: R73.02 
ICD-9-CM: 790.22 Coronary artery disease involving native coronary artery of native heart without angina pectoris     ICD-10-CM: I25.10 ICD-9-CM: 414.01 Obesity (BMI 30.0-34.9)     ICD-10-CM: W98.1 ICD-9-CM: 278.00 Encounter for immunization     ICD-10-CM: E82 ICD-9-CM: V03.89 Vitals BP Pulse Temp Resp Height(growth percentile) Weight(growth percentile) 138/72 (BP 1 Location: Left arm, BP Patient Position: Sitting) 69 98.3 °F (36.8 °C) (Oral) 16 5' 8\" (1.727 m) 201 lb 6.4 oz (91.4 kg) SpO2 BMI Smoking Status 95% 30.62 kg/m2 Never Smoker Vitals History BMI and BSA Data Body Mass Index Body Surface Area  
 30.62 kg/m 2 2.09 m 2 Preferred Pharmacy Pharmacy Name Phone [phone] [phone] Your Updated Medication List  
  
   
This list is accurate as of 10/1/18  9:23 AM.  Always use your most recent med list.  
  
  
  
  
 acetaminophen 500 mg tablet Commonly known as:  TYLENOL Take 1,000 mg by mouth daily as needed for Pain. amLODIPine 10 mg tablet Commonly known as:  Connye Squibb Take 1 Tab by mouth daily. Indications: hypertension  
  
 aspirin 81 mg chewable tablet Take 1 Tab by mouth nightly. atorvastatin 80 mg tablet Commonly known as:  LIPITOR  
TAKE ONE TABLET ONCE DAILY  
  
 carvedilol 12.5 mg tablet Commonly known as:  Lucía Carota Take 1 Tab by mouth two (2) times daily (with meals). cholecalciferol 1,000 unit Cap Commonly known as:  VITAMIN D3 Take  by mouth nightly. DULoxetine 60 mg capsule Commonly known as:  CYMBALTA  
take 1 capsule by mouth once daily  
  
 lisinopril-hydroCHLOROthiazide 20-12.5 mg per tablet Commonly known as:  PRINZIDE, ZESTORETIC  
take 2 tablet by mouth once daily We Performed the Following ADMIN INFLUENZA VIRUS VAC [ [facility]] INFLUENZA VACCINE INACTIVATED (IIV), SUBUNIT, ADJUVANTED, IM [id number] CPT(R)] Follow-up Instructions Return in about 6 months (around 4/1/2019) for HTN, chol, gluc, CAD  Fasting lab one week prior . To-Do List   
 04/01/2019 Lab:  HEMOGLOBIN A1C WITH EAG   
  
 04/01/2019 Lab:  LIPID PANEL   
  
 04/01/2019 Lab: METABOLIC PANEL, COMPREHENSIVE Patient Instructions Office visit in 6 months with fasting lab work a week before visit. Vaccine Information Statement Influenza (Flu) Vaccine (Inactivated or Recombinant): What you need to know Many Vaccine Information Statements are available in [place] Islander and other languages. See www.immunize.org/vis Hojas de Información Sobre Vacunas están disponibles en Español y en muchos otros idiomas. Visite www.immunize.org/vis 1. Why get vaccinated? Influenza (flu) is a contagious disease that spreads around the [facility] every year, usually between October and May. Flu is caused by influenza viruses, and is spread mainly by coughing, sneezing, and close contact. Anyone can get flu. Flu strikes suddenly and can last several days. Symptoms vary by age, but can include: 
 fever/chills  sore throat  muscle aches  fatigue  cough  headache  runny or stuffy nose Flu can also lead to pneumonia and blood infections, and cause diarrhea and seizures in children. If you have a medical condition, such as heart or lung disease, flu can make it worse. Flu is more dangerous for some people. Infants and young children, people 72years of age and older, pregnant women, and people with certain health conditions or a weakened immune system are at greatest risk. Each year thousands of people in the [facility] die from flu, and many more are hospitalized. Flu vaccine can: 
 keep you from getting flu, 
 make flu less severe if you do get it, and 
 keep you from spreading flu to your family and other people. 2. Inactivated and recombinant flu vaccines A dose of flu vaccine is recommended every flu season. Children 6 months through 6years of age may need two doses during the same flu season. Everyone else needs only one dose each flu season.   
 
 
Some inactivated flu vaccines contain a very small amount of a mercury-based preservative called thimerosal. Studies have not shown thimerosal in vaccines to be harmful, but flu vaccines that do not contain thimerosal are available. There is no live flu virus in flu shots. They cannot cause the flu. There are many flu viruses, and they are always changing. Each year a new flu vaccine is made to protect against three or four viruses that are likely to cause disease in the upcoming flu season. But even when the vaccine doesnt exactly match these viruses, it may still provide some protection Flu vaccine cannot prevent: 
 flu that is caused by a virus not covered by the vaccine, or 
 illnesses that look like flu but are not. It takes about 2 weeks for protection to develop after vaccination, and protection lasts through the flu season. 3. Some people should not get this vaccine Tell the person who is giving you the vaccine:  If you have any severe, life-threatening allergies. If you ever had a life-threatening allergic reaction after a dose of flu vaccine, or have a severe allergy to any part of this vaccine, you may be advised not to get vaccinated. Most, but not all, types of flu vaccine contain a small amount of egg protein.  If you ever had Guillain-Barré Syndrome (also called GBS). Some people with a history of GBS should not get this vaccine. This should be discussed with your doctor.  If you are not feeling well. It is usually okay to get flu vaccine when you have a mild illness, but you might be asked to come back when you feel better. 4. Risks of a vaccine reaction With any medicine, including vaccines, there is a chance of reactions. These are usually mild and go away on their own, but serious reactions are also possible. Most people who get a flu shot do not have any problems with it. Minor problems following a flu shot include:  
 soreness, redness, or swelling where the shot was given  hoarseness  sore, red or itchy eyes  cough  fever  aches  headache  itching  fatigue If these problems occur, they usually begin soon after the shot and last 1 or 2 days. More serious problems following a flu shot can include the following:  There may be a small increased risk of Guillain-Barré Syndrome (GBS) after inactivated flu vaccine. This risk has been estimated at 1 or 2 additional cases per million people vaccinated. This is much lower than the risk of severe complications from flu, which can be prevented by flu vaccine.  Young children who get the flu shot along with pneumococcal vaccine (PCV13) and/or DTaP vaccine at the same time might be slightly more likely to have a seizure caused by fever. Ask your doctor for more information. Tell your doctor if a child who is getting flu vaccine has ever had a seizure. Problems that could happen after any injected vaccine:  People sometimes faint after a medical procedure, including vaccination. Sitting or lying down for about 15 minutes can help prevent fainting, and injuries caused by a fall. Tell your doctor if you feel dizzy, or have vision changes or ringing in the ears.  Some people get severe pain in the shoulder and have difficulty moving the arm where a shot was given. This happens very rarely.  Any medication can cause a severe allergic reaction. Such reactions from a vaccine are very rare, estimated at about 1 in a million doses, and would happen within a few minutes to a few hours after the vaccination. As with any medicine, there is a very remote chance of a vaccine causing a serious injury or death. The safety of vaccines is always being monitored. For more information, visit: www.cdc.gov/vaccinesafety/ 
 
5. What if there is a serious reaction? What should I look for?  Look for anything that concerns you, such as signs of a severe allergic reaction, very high fever, or unusual behavior. Signs of a severe allergic reaction can include hives, swelling of the face and throat, difficulty breathing, a fast heartbeat, dizziness, and weakness  usually within a few minutes to a few hours after the vaccination. What should I do?  If you think it is a severe allergic reaction or other emergency that cant wait, call 9-1-1 and get the person to the nearest hospital. Otherwise, call your doctor.  Reactions should be reported to the Vaccine Adverse Event Reporting System (VAERS). Your doctor should file this report, or you can do it yourself through  the VAERS web site at www.vaers. [facility].gov, or by calling 2-541.940.2131. VAERS does not give medical advice. 6. The National Vaccine Injury Compensation Program 
 
The [facility] Vaccine Injury Compensation Program (VICP) is a federal program that was created to compensate people who may have been injured by certain vaccines. Persons who believe they may have been injured by a vaccine can learn about the program and about filing a claim by calling 9-575.490.9486 or visiting the [facility] website at www.[facility].gov/vaccinecompensation. There is a time limit to file a claim for compensation. 7. How can I learn more?  Ask your healthcare provider. He or she can give you the vaccine package insert or suggest other sources of information.  Call your local or state health department.  Contact the Centers for Disease Control and Prevention (CDC): 
- Call 4-782.918.3125 (1-800-CDC-INFO) or 
- Visit CDCs website at www.cdc.gov/flu Vaccine Information Statement Inactivated Influenza Vaccine 8/7/2015 
42 MARINA Ding 470VJ-66 Department of Health and [employer] Centers for Disease Control and Prevention Office Use Only Introducing [facility] & HEALTH SERVICES! [employer] introduces [employer] patient portal. Now you can access parts of your medical record, email your doctor's office, and request medication refills online. 1. In your internet browser, go to [email or web address] 2. Click on the First Time User? Click Here link in the Sign In box. You will see the New Member Sign Up page. 3. Enter your [employer] Access Code exactly as it appears below. You will not need to use this code after youve completed the sign-up process. If you do not sign up before the expiration date, you must request a new code. · [employer] Access Code: [email or web address] Expires: 12/30/2018  9:23 AM 
 
4. Enter the last four digits of your Social Security Number (xxxx) and Date of Birth (mm/dd/yyyy) as indicated and click Submit. You will be taken to the next sign-up page. 5. Create a [employer] ID. This will be your [employer] login ID and cannot be changed, so think of one that is secure and easy to remember. 6. Create a [employer] password. You can change your password at any time. 7. Enter your Password Reset Question and Answer. This can be used at a later time if you forget your password. 8. Enter your e-mail address. You will receive e-mail notification when new information is available in [address] 9. Click Sign Up. You can now view and download portions of your medical record. 10. Click the Download Summary menu link to download a portable copy of your medical information. If you have questions, please visit the Frequently Asked Questions section of the [employer] website. Remember, [employer] is NOT to be used for urgent needs. For medical emergencies, dial 911. Now available from your iPhone and Android! Please provide this summary of care documentation to your next provider. Your primary care clinician is listed as Lazaro Tucker. If you have any questions after today's visit, please call [phone].

## 2018-10-01 NOTE — PATIENT INSTRUCTIONS
Office visit in 6 months with fasting lab work a week before visit. Vaccine Information Statement Influenza (Flu) Vaccine (Inactivated or Recombinant): What you need to know Many Vaccine Information Statements are available in Vietnamese and other languages. See www.immunize.org/vis Hojas de Información Sobre Vacunas están disponibles en Español y en muchos otros idiomas. Visite www.immunize.org/vis 1. Why get vaccinated? Influenza (flu) is a contagious disease that spreads around the [facility] every year, usually between October and May. Flu is caused by influenza viruses, and is spread mainly by coughing, sneezing, and close contact. Anyone can get flu. Flu strikes suddenly and can last several days. Symptoms vary by age, but can include: 
 fever/chills  sore throat  muscle aches  fatigue  cough  headache  runny or stuffy nose Flu can also lead to pneumonia and blood infections, and cause diarrhea and seizures in children. If you have a medical condition, such as heart or lung disease, flu can make it worse. Flu is more dangerous for some people. Infants and young children, people 72years of age and older, pregnant women, and people with certain health conditions or a weakened immune system are at greatest risk. Each year thousands of people in the [facility] die from flu, and many more are hospitalized. Flu vaccine can: 
 keep you from getting flu, 
 make flu less severe if you do get it, and 
 keep you from spreading flu to your family and other people. 2. Inactivated and recombinant flu vaccines A dose of flu vaccine is recommended every flu season. Children 6 months through 6years of age may need two doses during the same flu season. Everyone else needs only one dose each flu season.   
 
 
Some inactivated flu vaccines contain a very small amount of a mercury-based preservative called thimerosal. Studies have not shown thimerosal in vaccines to be harmful, but flu vaccines that do not contain thimerosal are available. There is no live flu virus in flu shots. They cannot cause the flu. There are many flu viruses, and they are always changing. Each year a new flu vaccine is made to protect against three or four viruses that are likely to cause disease in the upcoming flu season. But even when the vaccine doesnt exactly match these viruses, it may still provide some protection Flu vaccine cannot prevent: 
 flu that is caused by a virus not covered by the vaccine, or 
 illnesses that look like flu but are not. It takes about 2 weeks for protection to develop after vaccination, and protection lasts through the flu season. 3. Some people should not get this vaccine Tell the person who is giving you the vaccine:  If you have any severe, life-threatening allergies. If you ever had a life-threatening allergic reaction after a dose of flu vaccine, or have a severe allergy to any part of this vaccine, you may be advised not to get vaccinated. Most, but not all, types of flu vaccine contain a small amount of egg protein.  If you ever had Guillain-Barré Syndrome (also called GBS). Some people with a history of GBS should not get this vaccine. This should be discussed with your doctor.  If you are not feeling well. It is usually okay to get flu vaccine when you have a mild illness, but you might be asked to come back when you feel better. 4. Risks of a vaccine reaction With any medicine, including vaccines, there is a chance of reactions. These are usually mild and go away on their own, but serious reactions are also possible. Most people who get a flu shot do not have any problems with it. Minor problems following a flu shot include:  
 soreness, redness, or swelling where the shot was given  hoarseness  sore, red or itchy eyes  cough  fever  aches  headache  itching  fatigue If these problems occur, they usually begin soon after the shot and last 1 or 2 days. More serious problems following a flu shot can include the following:  There may be a small increased risk of Guillain-Barré Syndrome (GBS) after inactivated flu vaccine. This risk has been estimated at 1 or 2 additional cases per million people vaccinated. This is much lower than the risk of severe complications from flu, which can be prevented by flu vaccine.  Young children who get the flu shot along with pneumococcal vaccine (PCV13) and/or DTaP vaccine at the same time might be slightly more likely to have a seizure caused by fever. Ask your doctor for more information. Tell your doctor if a child who is getting flu vaccine has ever had a seizure. Problems that could happen after any injected vaccine:  People sometimes faint after a medical procedure, including vaccination. Sitting or lying down for about 15 minutes can help prevent fainting, and injuries caused by a fall. Tell your doctor if you feel dizzy, or have vision changes or ringing in the ears.  Some people get severe pain in the shoulder and have difficulty moving the arm where a shot was given. This happens very rarely.  Any medication can cause a severe allergic reaction. Such reactions from a vaccine are very rare, estimated at about 1 in a million doses, and would happen within a few minutes to a few hours after the vaccination. As with any medicine, there is a very remote chance of a vaccine causing a serious injury or death. The safety of vaccines is always being monitored. For more information, visit: www.cdc.gov/vaccinesafety/ 
 
5. What if there is a serious reaction? What should I look for?  Look for anything that concerns you, such as signs of a severe allergic reaction, very high fever, or unusual behavior.  
 
Signs of a severe allergic reaction can include hives, swelling of the face and throat, difficulty breathing, a fast heartbeat, dizziness, and weakness  usually within a few minutes to a few hours after the vaccination. What should I do?  If you think it is a severe allergic reaction or other emergency that cant wait, call 9-1-1 and get the person to the nearest hospital. Otherwise, call your doctor.  Reactions should be reported to the Vaccine Adverse Event Reporting System (VAERS). Your doctor should file this report, or you can do it yourself through  the VAERS web site at www.vaers. [facility].gov, or by calling 8-824.468.9974. VAERS does not give medical advice. 6. The National Vaccine Injury Compensation Program 
 
The [facility] Vaccine Injury Compensation Program (VICP) is a federal program that was created to compensate people who may have been injured by certain vaccines. Persons who believe they may have been injured by a vaccine can learn about the program and about filing a claim by calling 8-183.125.2088 or visiting the [address] [place] [employer] website at www.[facility].gov/vaccinecompensation. There is a time limit to file a claim for compensation. 7. How can I learn more?  Ask your healthcare provider. He or she can give you the vaccine package insert or suggest other sources of information.  Call your local or state health department.  Contact the Centers for Disease Control and Prevention (CDC): 
- Call 4-251.846.5109 (1-800-CDC-INFO) or 
- Visit CDCs website at www.cdc.gov/flu Vaccine Information Statement Inactivated Influenza Vaccine 8/7/2015 
[address] [facility] [id number] [facility] [employer] Centers for Disease Control and Prevention Office Use Only

## 2018-10-01 NOTE — PROGRESS NOTES
HISTORY OF PRESENT ILLNESS Kiley Lopez is a 77 y.o. male. HPI  He presents for follow up of hypertension, hyperlipidemia, coronary artery disease, history of prior MI and status post CABG and glucose intolerance. Diet and Lifestyle: generally follows a low fat low cholesterol diet, generally follows a low sodium diet, follows a diabetic diet regularly, exercises sporadically, nonsmoker Medication compliance: compliant all of the time Medication side effects: none Home BP Monitoring: occasionally 140's/90's  
Cardiovascular ROS: 
He complains of claudication. Neurogenic Right thigh after walking 2 blocks He denies palpitations, orthopnea, exertional chest pressure/discomfort, lower extremity edema, dyspnea on exertion, dizziness Pain in back has \"diminished greatly. \"  It occurs only if walking. Patient Active Problem List  
Diagnosis Code  Hyperlipidemia, mixed E78.2  Impotence N52.9  Diverticulosis K57.90  
 Hemorrhoids K64.9  Vitamin D deficiency E55.9  Benign hypertension with chronic kidney disease, stage III I12.9, N18.3  GERD (gastroesophageal reflux disease) K21.9  Intermittent angle-closure glaucoma H40.239  Coronary artery disease involving native coronary artery of native heart without angina pectoris I25.10  Nephrolithiasis N20.0  Left median nerve neuropathy G56.12  
 CKD (chronic kidney disease) stage 3, GFR 30-59 ml/min N18.3  Advance directive discussed with patient Z70.80  Lumbar stenosis with neurogenic claudication M48.062  
 Unequal blood pressure in upper extremities R09.89  Glucose intolerance (impaired glucose tolerance) R73.02  
 Combined forms of age-related cataract of right eye H25.811  
 Combined forms of age-related cataract of left eye H25.812  Spinal stenosis M48.00  Obesity (BMI 30.0-34. 9) E66.9 Past Medical History:  
Diagnosis Date  A-fib ([facility]  Arthritis  CAD (coronary artery disease) 12/24/2013  Cataract  Diverticulosis 9/22/2009  Dyslipidemia 9/22/2009  Hemorrhoids 9/22/2009  
 HTN 9/22/2009  Hypercholesterolemia  Impotence 9/22/2009  [facility] 12/2013 CABG X 3   
 Nausea & vomiting  Neuropathy 9/22/2009  Open angle glaucoma with borderline intraocular pressure  Vitamin D deficiency 9/22/2009 Past Surgical History:  
Procedure Laterality Date  CARDIAC SURG PROCEDURE UNLIST CABG X 3  
 ENDOSCOPY, COLON, DIAGNOSTIC  [id number]  HX CATARACT REMOVAL Bilateral 09/13/2017  HX CORONARY ARTERY BYPASS GRAFT  12/2013  
 triple  HX HERNIA REPAIR Bilateral   
 HX LITHOTRIPSY  [id number]  HX ORTHOPAEDIC    
 back surgery Social History Social History  Marital status:  Spouse name: N/A  
 Number of children: N/A  
 Years of education: N/A Social History Main Topics  Smoking status: Never Smoker  Smokeless tobacco: Never Used  Alcohol use No  
 Drug use: No  
 Sexual activity: Yes  
  Partners: Female Other Topics Concern  None Social History Narrative Family History Problem Relation Age of Onset  Hypertension Mother  Diabetes Mother  Cancer Father LIVER  Alcohol abuse Brother  Diabetes Sister  Hypertension Sister  Arthritis-osteo Sister  Kidney Disease Sister DIALYSIS  
 Alcohol abuse Brother  Suicide Brother  No Known Problems Brother  No Known Problems Brother  Dementia Brother  Hypertension Son   
Yesi Live Hypertension Son  Anesth Problems Neg Hx Allergies Allergen Reactions  Percocet [Oxycodone-Acetaminophen] Other (comments) \"feel weird\" Current Outpatient Prescriptions Medication Sig Dispense Refill  lisinopril-hydroCHLOROthiazide (PRINZIDE, ZESTORETIC) 20-12.5 mg per tablet take 2 tablet by mouth once daily 60 Tab 11  
 carvedilol (COREG) 12.5 mg tablet Take 1 Tab by mouth two (2) times daily (with meals). 60 Tab 11  
 DULoxetine (CYMBALTA) 60 mg capsule take 1 capsule by mouth once daily 30 Cap 5  
 atorvastatin (LIPITOR) 80 mg tablet TAKE ONE TABLET ONCE DAILY 30 Tab 11  
 amLODIPine (NORVASC) 10 mg tablet Take 1 Tab by mouth daily. Indications: hypertension 30 Tab 11  
 acetaminophen (TYLENOL) 500 mg tablet Take 1,000 mg by mouth daily as needed for Pain.  aspirin 81 mg chewable tablet Take 1 Tab by mouth nightly. 90 Tab 3  cholecalciferol (VITAMIN D3) 1,000 unit cap Take  by mouth nightly. Review of Systems Constitutional: Positive for malaise/fatigue. Negative for weight loss. Gastrointestinal: Negative for constipation and diarrhea. Musculoskeletal: Positive for back pain. Negative for joint pain. Neurological: Negative for tingling and focal weakness. Visit Vitals  /72 (BP 1 Location: Left arm, BP Patient Position: Sitting)  Pulse 69  Temp 98.3 °F (36.8 °C) (Oral)  Resp 16  
 Ht 5' 8\" (1.727 m)  Wt 201 lb 6.4 oz (91.4 kg)  SpO2 95%  BMI 30.62 kg/m2 Physical Exam  
Constitutional: He is oriented to person, place, and time. He appears well-developed and well-nourished. HENT:  
Head: Normocephalic and atraumatic. Eyes: Conjunctivae are normal. Pupils are equal, round, and reactive to light. Neck: Neck supple. Carotid bruit is not present. No thyromegaly present. Cardiovascular: Normal rate, regular rhythm and normal heart sounds. PMI is not displaced. Exam reveals no gallop. No murmur heard. Pulses: 
     Dorsalis pedis pulses are 2+ on the right side, and 2+ on the left side. Posterior tibial pulses are 2+ on the right side, and 2+ on the left side. Pulmonary/Chest: Effort normal. He has no wheezes. He has no rhonchi. He has no rales. Abdominal: Soft. Normal appearance. He exhibits no abdominal bruit and no mass. There is no hepatosplenomegaly. There is no tenderness. Musculoskeletal: He exhibits no edema. Lymphadenopathy:  
  He has no cervical adenopathy. Right: No supraclavicular adenopathy present. Left: No supraclavicular adenopathy present. Neurological: He is alert and oriented to person, place, and time. No sensory deficit. Skin: Skin is warm, dry and intact. No rash noted. Psychiatric: He has a normal mood and affect. His behavior is normal.  
Nursing note and vitals reviewed. Results for orders placed or performed during the hospital encounter of 08/31/18 METABOLIC PANEL, BASIC Result Value Ref Range Sodium 143 136 - 145 mmol/L Potassium 3.8 3.5 - 5.1 mmol/L Chloride 112 (H) 97 - 108 mmol/L  
 CO2 21 21 - 32 mmol/L Anion gap 10 5 - 15 mmol/L Glucose 105 (H) 65 - 100 mg/dL BUN 19 6 - 20 MG/DL Creatinine 1.03 0.70 - 1.30 MG/DL  
 BUN/Creatinine ratio 18 12 - 20 GFR est AA >60 >60 ml/min/1.73m2 GFR est non-AA >60 >60 ml/min/1.73m2 Calcium 7.4 (L) 8.5 - 10.1 MG/DL Lab Results Component Value Date/Time Hemoglobin A1c 5.9 (H) 08/13/2018 01:35 PM  
 Hemoglobin A1c (POC) 5.5 04/17/2017 10:15 AM  
 
Lab Results Component Value Date/Time Cholesterol, total 127 03/29/2018 09:42 AM  
 HDL Cholesterol 40 03/29/2018 09:42 AM  
 LDL, calculated 66 03/29/2018 09:42 AM  
 VLDL, calculated 21 03/29/2018 09:42 AM  
 Triglyceride 104 03/29/2018 09:42 AM  
 CHOL/HDL Ratio 2.7 07/09/2010 09:43 AM  
 
 
 
ASSESSMENT and PLAN 
  ICD-10-CM ICD-9-CM 1. Benign hypertension with chronic kidney disease, stage III I12.9 403.10 N18.3 585.3 2. Hyperlipidemia, mixed E78.2 272.2 LIPID PANEL  
   METABOLIC PANEL, COMPREHENSIVE 3. Glucose intolerance (impaired glucose tolerance) R73.02 790.22 HEMOGLOBIN A1C WITH EAG 4. Coronary artery disease involving native coronary artery of native heart without angina pectoris I25.10 414.01   
5. Obesity (BMI 30.0-34. 9) E66.9 278.00   
 6. Encounter for immunization Z23 V03.89 INFLUENZA VACCINE INACTIVATED (IIV), SUBUNIT, ADJUVANTED, IM  
   ADMIN INFLUENZA VIRUS VAC Diagnoses and all orders for this visit: 1. Benign hypertension with chronic kidney disease, stage III Blood pressure is at goal and creatinine is improved. 2. Hyperlipidemia, mixed Hyperlipidemia is controlled -     LIPID PANEL; Future -     METABOLIC PANEL, COMPREHENSIVE; Future 3. Glucose intolerance (impaired glucose tolerance) A1c is above his prior value of 5.6 in September 2017.  
-     HEMOGLOBIN A1C WITH EAG; Future 4. Coronary artery disease involving native coronary artery of native heart without angina pectoris Stable taking aspirin and statin. 5. Obesity (BMI 30.0-34. 9) Discussed using smaller plate for portion control, keeping a food diary for awareness of food consumed, checking weight often, and increasing physical activity. Will re-evaluate next visit. 6. Encounter for immunization -     Influenza Vaccine Inactivated (IIV)(FLUAD), Subunit, Adjuvanted, IM, (81838) -     Administration fee () for Medicare insured patients Follow-up Disposition: 
Return in about 6 months (around 4/1/2019) for HTN, chol, gluc, CAD  Fasting lab one week prior . lab results and schedule of future lab studies reviewed with patient 
reviewed diet, exercise and weight control 
cardiovascular risk and specific lipid/LDL goals reviewed I have discussed the diagnosis, evaluation and treatment options and the intended plan with the patient. Patient understands and is in agreement. The patient has received an after-visit summary and questions were answered concerning future plans. I have discussed side effects and warnings of any new medications with the patient as well.

## 2018-12-31 RX ORDER — ATORVASTATIN CALCIUM 80 MG/1
TABLET, FILM COATED ORAL
Qty: 30 TAB | Refills: 2 | OUTPATIENT
Start: 2018-12-31 | End: 2019-04-11

## 2018-12-31 NOTE — TELEPHONE ENCOUNTER
[phone] - patient number    Please call patient. He is not sure what has happened to all of his medications. Said he hasn't had them in about 2 weeks then said 3 weeks. He stated he may have thrown them away and not he doesn't know what to do.

## 2019-04-04 DIAGNOSIS — E78.2 HYPERLIPIDEMIA, MIXED: ICD-10-CM

## 2019-04-04 DIAGNOSIS — R73.02 GLUCOSE INTOLERANCE (IMPAIRED GLUCOSE TOLERANCE): ICD-10-CM

## 2019-04-11 ENCOUNTER — OFFICE VISIT (OUTPATIENT)
Dept: INTERNAL MEDICINE CLINIC | Facility: CLINIC | Age: 79
End: 2019-04-11

## 2019-04-11 VITALS
TEMPERATURE: 98.2 F | HEIGHT: 68 IN | RESPIRATION RATE: 12 BRPM | OXYGEN SATURATION: 95 % | HEART RATE: 67 BPM | SYSTOLIC BLOOD PRESSURE: 176 MMHG | BODY MASS INDEX: 30.01 KG/M2 | WEIGHT: 198 LBS | DIASTOLIC BLOOD PRESSURE: 90 MMHG

## 2019-04-11 DIAGNOSIS — N18.30 BENIGN HYPERTENSION WITH CHRONIC KIDNEY DISEASE, STAGE III (HCC): ICD-10-CM

## 2019-04-11 DIAGNOSIS — E78.2 HYPERLIPIDEMIA, MIXED: ICD-10-CM

## 2019-04-11 DIAGNOSIS — I25.10 CORONARY ARTERY DISEASE INVOLVING NATIVE CORONARY ARTERY OF NATIVE HEART WITHOUT ANGINA PECTORIS: ICD-10-CM

## 2019-04-11 DIAGNOSIS — E66.9 OBESITY (BMI 30.0-34.9): ICD-10-CM

## 2019-04-11 DIAGNOSIS — Z13.31 SCREENING FOR DEPRESSION: ICD-10-CM

## 2019-04-11 DIAGNOSIS — R73.02 GLUCOSE INTOLERANCE (IMPAIRED GLUCOSE TOLERANCE): ICD-10-CM

## 2019-04-11 DIAGNOSIS — Z00.00 MEDICARE ANNUAL WELLNESS VISIT, SUBSEQUENT: Primary | ICD-10-CM

## 2019-04-11 DIAGNOSIS — Z71.89 ADVANCE DIRECTIVE DISCUSSED WITH PATIENT: ICD-10-CM

## 2019-04-11 DIAGNOSIS — M48.062 LUMBAR STENOSIS WITH NEUROGENIC CLAUDICATION: ICD-10-CM

## 2019-04-11 DIAGNOSIS — I12.9 BENIGN HYPERTENSION WITH CHRONIC KIDNEY DISEASE, STAGE III (HCC): ICD-10-CM

## 2019-04-11 RX ORDER — CHOLECALCIFEROL (VITAMIN D3) 125 MCG
CAPSULE ORAL
COMMUNITY
End: 2019-04-11

## 2019-04-11 RX ORDER — LOSARTAN POTASSIUM AND HYDROCHLOROTHIAZIDE 12.5; 1 MG/1; MG/1
1 TABLET ORAL DAILY
COMMUNITY
Start: 2019-02-09 | End: 2019-04-11 | Stop reason: SDUPTHER

## 2019-04-11 RX ORDER — DULOXETIN HYDROCHLORIDE 30 MG/1
30 CAPSULE, DELAYED RELEASE ORAL DAILY
Qty: 30 CAP | Refills: 5 | Status: SHIPPED | OUTPATIENT
Start: 2019-04-11 | End: 2020-05-01

## 2019-04-11 RX ORDER — CARVEDILOL 12.5 MG/1
12.5 TABLET ORAL 2 TIMES DAILY WITH MEALS
Qty: 60 TAB | Refills: 11 | Status: SHIPPED | OUTPATIENT
Start: 2019-04-11 | End: 2021-01-29 | Stop reason: SDUPTHER

## 2019-04-11 RX ORDER — ATORVASTATIN CALCIUM 80 MG/1
TABLET, FILM COATED ORAL
Qty: 30 TAB | Refills: 11 | Status: SHIPPED | OUTPATIENT
Start: 2019-04-11 | End: 2020-05-01

## 2019-04-11 RX ORDER — LOSARTAN POTASSIUM AND HYDROCHLOROTHIAZIDE 12.5; 1 MG/1; MG/1
1 TABLET ORAL DAILY
Qty: 30 TAB | Refills: 11 | Status: SHIPPED | OUTPATIENT
Start: 2019-04-11 | End: 2020-06-25

## 2019-04-11 RX ORDER — RANITIDINE 300 MG/1
300 TABLET ORAL
COMMUNITY
Start: 2019-02-11 | End: 2019-04-11 | Stop reason: ALTCHOICE

## 2019-04-11 RX ORDER — AMLODIPINE BESYLATE 10 MG/1
10 TABLET ORAL DAILY
Qty: 30 TAB | Refills: 11 | Status: SHIPPED | OUTPATIENT
Start: 2019-04-11 | End: 2020-05-01

## 2019-04-11 NOTE — Clinical Note
Send copy of note to Dr Christian James (cardiology in [facility]) and request his last 2 or 3 notes be sent to us.

## 2019-04-11 NOTE — ACP (ADVANCE CARE PLANNING)
With patient's permission advance care planning discussed. Health Care Agent would be son Marty Zambrano. First Alternate Health Care Agent would be daughter-in-law Vijaya Aquino. He wants no life prolonging treatment if death is imminent. He wants life prolonging treatment for a week looking for improvement if there is overwhelming, permanent neurologic injury. Reviewed paperwork. Conversation took 4 minutes.

## 2019-04-11 NOTE — PROGRESS NOTES
Nikita Thakur 73  Identified pt with two pt identifiers(name and ). Chief Complaint Patient presents with  Hypertension  Cholesterol Problem  Blood sugar problem  Coronary Artery Disease Reviewed record In preparation for visit and have obtained necessary documentation. Has info on advanced directive but has not filled them out. 1. Have you been to the ER, urgent care clinic or hospitalized since your last visit? No  
 
2. Have you seen or consulted any other health care providers outside of the [address] [place] [address] since your last visit? Include any pap smears or colon screening. No 
 
Vitals reviewed with provider. Health Maintenance reviewed:  
 
Health Maintenance Due Topic  DTaP/Tdap/Td series (1 - Tdap)  Shingrix Vaccine Age 50> (1 of 2)  MEDICARE YEARLY EXAM   
  
 
 
Wt Readings from Last 3 Encounters:  
19 198 lb (89.8 kg) 10/01/18 201 lb 6.4 oz (91.4 kg) 18 200 lb (90.7 kg) Temp Readings from Last 3 Encounters:  
19 98.2 °F (36.8 °C) (Oral) 10/01/18 98.3 °F (36.8 °C) (Oral) 18 98.6 °F (37 °C) BP Readings from Last 3 Encounters:  
19 176/90  
10/01/18 138/72  
18 168/76 Pulse Readings from Last 3 Encounters:  
19 67  
10/01/18 69  
18 62 Vitals:  
 19 1120 BP: 176/90 Pulse: 67 Resp: 12 Temp: 98.2 °F (36.8 °C) TempSrc: Oral  
SpO2: 95% Weight: 198 lb (89.8 kg) Height: 5' 8\" (1.727 m) PainSc:   0 - No pain Learning Assessment:  
:  
 
 
Learning Assessment 10/14/2014 PRIMARY LEARNER Patient HIGHEST LEVEL OF EDUCATION - PRIMARY LEARNER  GRADUATED HIGH SCHOOL OR GED PRIMARY LANGUAGE ENGLISH  
LEARNER PREFERENCE PRIMARY LISTENING  
ANSWERED BY self RELATIONSHIP SELF Depression Screening:  
:  
 
 
3 most recent PHQ Screens 2019 Little interest or pleasure in doing things Not at all Feeling down, depressed, irritable, or hopeless Nearly every day Total Score PHQ 2 3 Fall Risk Assessment:  
:  
 
 
Fall Risk Assessment, last 12 mths 4/11/2019 Able to walk? Yes Fall in past 12 months? No  
Fall with injury? -  
Number of falls in past 12 months - Fall Risk Score -  
  
 
Abuse Screening:  
:  
 
 
Abuse Screening Questionnaire 4/11/2019 3/29/2018 3/10/2017 3/29/2016 3/12/2015 Do you ever feel afraid of your partner? N N N N N Are you in a relationship with someone who physically or mentally threatens you? N N N N N Is it safe for you to go home? Y Y Y Y Y  
  
 
ADL Screening:  
:  
 
 

## 2019-04-11 NOTE — Clinical Note
Get records from Dr Miguel Fuentes Cardiologist in [facility].  Including medication list

## 2019-04-11 NOTE — PROGRESS NOTES
HISTORY OF PRESENT ILLNESS Deacon Madrigal is a 77 y.o. male. HPI  He presents for follow up of hypertension with CKD, hyperlipidemia, coronary artery disease, obesity and glucose intolerance. Diet and Lifestyle: generally follows a low fat low cholesterol diet, not attempting to follow a low sodium diet, follows a diabetic diet regularly, exercises sporadically, nonsmoker Medication compliance: He is not taking amlodipine 10 mg; he is  only taking carvedilol 12.5 mg once a day, taking both lisinopril/HCT 20/12.5 mg and losartan//12.5 mg.  
Medication side effects: none Home BP Monitoring: not done Cardiovascular ROS: 
He denies palpitations, orthopnea, exertional chest pressure/discomfort, claudication, lower extremity edema, dyspnea on exertion, dizziness He requests refill of duloxetine which he takes for low back pain. He has lumbar surgery with only partial relief of symptoms. He is unsure if duloxetine is helping. Patient Active Problem List  
Diagnosis Code  Hyperlipidemia, mixed E78.2  Impotence N52.9  Diverticulosis K57.90  
 Hemorrhoids K64.9  Vitamin D deficiency E55.9  Benign hypertension with chronic kidney disease, stage III ([facility]) I12.9, N18.3  GERD (gastroesophageal reflux disease) K21.9  Intermittent angle-closure glaucoma H40.239  Coronary artery disease involving native coronary artery of native heart without angina pectoris I25.10  Nephrolithiasis N20.0  Left median nerve neuropathy G56.12  
 CKD (chronic kidney disease) stage 3, GFR 30-59 ml/min ([facility]) N18.3  Advance directive discussed with patient Z70.80  Lumbar stenosis with neurogenic claudication M48.062  
 Unequal blood pressure in upper extremities R09.89  Glucose intolerance (impaired glucose tolerance) R73.02  
 Combined forms of age-related cataract of right eye H25.811  
 Combined forms of age-related cataract of left eye H25.812  Spinal stenosis M48.00  
  Obesity (BMI 30.0-34. 9) E66.9 Past Medical History:  
Diagnosis Date  A-fib ([facility] [address]  Arthritis  CAD (coronary artery disease) 12/24/2013  Cataract  Diverticulosis 9/22/2009  Dyslipidemia 9/22/2009  Hemorrhoids 9/22/2009  
 HTN 9/22/2009  Hypercholesterolemia  Impotence 9/22/2009  MI (myocardial infarction) ([facility] [address] 12/2013 CABG X 3   
 Nausea & vomiting  Neuropathy 9/22/2009  Open angle glaucoma with borderline intraocular pressure  Vitamin D deficiency 9/22/2009 Past Surgical History:  
Procedure Laterality Date  CARDIAC SURG PROCEDURE UNLIST CABG X 3  
 ENDOSCOPY, COLON, DIAGNOSTIC  [id number]  HX CATARACT REMOVAL Bilateral 09/13/2017  HX CORONARY ARTERY BYPASS GRAFT  12/2013  
 triple  HX HERNIA REPAIR Bilateral   
 HX LITHOTRIPSY  [id number]  HX ORTHOPAEDIC    
 back surgery Social History Socioeconomic History  Marital status:  Spouse name: Not on file  Number of children: Not on file  Years of education: Not on file  Highest education level: Not on file Tobacco Use  Smoking status: Never Smoker  Smokeless tobacco: Never Used Substance and Sexual Activity  Alcohol use: No  
 Drug use: No  
 Sexual activity: Yes  
  Partners: Female Family History Problem Relation Age of Onset  Hypertension Mother  Diabetes Mother  Cancer Father LIVER  Alcohol abuse Brother  Diabetes Sister  Hypertension Sister  Arthritis-osteo Sister  Kidney Disease Sister DIALYSIS  
 Alcohol abuse Brother  Suicide Brother  No Known Problems Brother  No Known Problems Brother  Dementia Brother  Hypertension Son   
[facility] Hypertension Son  Anesth Problems Neg Hx Allergies Allergen Reactions  Percocet [Oxycodone-Acetaminophen] Other (comments) \"feel weird\" Current Outpatient Medications Medication Sig Dispense Refill  naproxen sodium (ALEVE) 220 mg cap Take  by mouth.  raNITIdine (ZANTAC) 300 mg tab 300 mg nightly.  atorvastatin (LIPITOR) 80 mg tablet TAKE ONE TABLET ONCE DAILY 30 Tab 2  carvedilol (COREG) 12.5 mg tablet Take 1 Tab by mouth two (2) times daily (with meals). 60 Tab 11  
 DULoxetine (CYMBALTA) 60 mg capsule take 1 capsule by mouth once daily 30 Cap 5  
 acetaminophen (TYLENOL) 500 mg tablet Take 1,000 mg by mouth daily as needed for Pain.  aspirin 81 mg chewable tablet Take 1 Tab by mouth nightly. 90 Tab 3  
 amLODIPine (NORVASC) 10 mg tablet Take 1 Tab by mouth daily. Indications: hypertension 30 Tab 11 Review of Systems Constitutional: Negative for malaise/fatigue and weight loss. Gastrointestinal: Negative for constipation, diarrhea and heartburn. Musculoskeletal: Positive for back pain. Negative for joint pain. Neurological: Negative for tingling and focal weakness. Visit Vitals /90 (BP 1 Location: Left arm, BP Patient Position: Sitting) Pulse 67 Temp 98.2 °F (36.8 °C) (Oral) Resp 12 Ht 5' 8\" (1.727 m) Wt 198 lb (89.8 kg) SpO2 95% BMI 30.11 kg/m² Physical Exam  
Constitutional: He is oriented to person, place, and time. He appears well-developed and well-nourished. HENT:  
Head: Normocephalic and atraumatic. Eyes: Pupils are equal, round, and reactive to light. Conjunctivae are normal.  
Neck: Neck supple. Carotid bruit is not present. No thyromegaly present. Cardiovascular: Normal rate, regular rhythm and normal heart sounds. PMI is not displaced. Exam reveals no gallop. No murmur heard. Pulses: 
     Dorsalis pedis pulses are 2+ on the right side, and 2+ on the left side. Posterior tibial pulses are 2+ on the right side, and 2+ on the left side. Pulmonary/Chest: Effort normal. He has no wheezes. He has no rhonchi. He has no rales. Abdominal: Soft. Normal appearance.  He exhibits no abdominal bruit and no mass. There is no hepatosplenomegaly. There is no tenderness. Musculoskeletal: He exhibits no edema. Lymphadenopathy:  
  He has no cervical adenopathy. Right: No supraclavicular adenopathy present. Left: No supraclavicular adenopathy present. Neurological: He is alert and oriented to person, place, and time. No sensory deficit. Skin: Skin is warm, dry and intact. No rash noted. Psychiatric: He has a normal mood and affect. His behavior is normal.  
Nursing note and vitals reviewed. Lab Results Component Value Date/Time Hemoglobin A1c 5.9 (H) 08/13/2018 01:35 PM  
 Hemoglobin A1c (POC) 5.5 04/17/2017 10:15 AM  
 
Lab Results Component Value Date/Time Cholesterol, total 127 03/29/2018 09:42 AM  
 HDL Cholesterol 40 03/29/2018 09:42 AM  
 LDL, calculated 66 03/29/2018 09:42 AM  
 VLDL, calculated 21 03/29/2018 09:42 AM  
 Triglyceride 104 03/29/2018 09:42 AM  
 CHOL/HDL Ratio 2.7 07/09/2010 09:43 AM  
 
Lab Results Component Value Date/Time Sodium 143 09/01/2018 04:02 AM  
 Potassium 3.8 09/01/2018 04:02 AM  
 Chloride 112 (H) 09/01/2018 04:02 AM  
 CO2 21 09/01/2018 04:02 AM  
 Anion gap 10 09/01/2018 04:02 AM  
 Glucose 105 (H) 09/01/2018 04:02 AM  
 BUN 19 09/01/2018 04:02 AM  
 Creatinine 1.03 09/01/2018 04:02 AM  
 BUN/Creatinine ratio 18 09/01/2018 04:02 AM  
 GFR est AA >60 09/01/2018 04:02 AM  
 GFR est non-AA >60 09/01/2018 04:02 AM  
 Calcium 7.4 (L) 09/01/2018 04:02 AM  
 Bilirubin, total 0.6 07/05/2018 09:14 AM  
 AST (SGOT) 29 07/05/2018 09:14 AM  
 Alk. phosphatase 71 07/05/2018 09:14 AM  
 Protein, total 6.7 07/05/2018 09:14 AM  
 Albumin 3.6 07/05/2018 09:14 AM  
 Globulin 3.1 07/05/2018 09:14 AM  
 A-G Ratio 1.2 07/05/2018 09:14 AM  
 ALT (SGPT) 30 07/05/2018 09:14 AM  
 
 
ASSESSMENT and PLAN 
  ICD-10-CM ICD-9-CM 1. Medicare annual wellness visit, subsequent Z00.00 V70.0 2. Advance directive discussed with patient Z71.89 V65.49 3. Benign hypertension with chronic kidney disease, stage III ([facility]) I12.9 403.10 amLODIPine (NORVASC) 10 mg tablet N18.3 585.3 losartan-hydroCHLOROthiazide (HYZAAR) 100-12.5 mg per tablet  
   carvedilol (COREG) 12.5 mg tablet 4. Hyperlipidemia, mixed E78.2 272.2 atorvastatin (LIPITOR) 80 mg tablet LIPID PANEL  
   METABOLIC PANEL, COMPREHENSIVE 5. Glucose intolerance (impaired glucose tolerance) R73.02 790.22 HEMOGLOBIN A1C WITH EAG 6. Coronary artery disease involving native coronary artery of native heart without angina pectoris I25.10 414.01   
7. Obesity (BMI 30.0-34. 9) E66.9 278.00   
8. Screening for depression Z13.31 V79.0 [address] [employer] 9. Lumbar stenosis with neurogenic claudication M48.062 724.03 DULoxetine (CYMBALTA) 30 mg capsule Diagnoses and all orders for this visit: 
 
1. Medicare annual wellness visit, subsequent 2. Advance directive discussed with patient 3. Benign hypertension with chronic kidney disease, stage III ([facility] [address] Blood pressure is out of control because he has gotten medication mixed up. He blames this on stress taking care of wife with demential. I have discarded all bottles that do not need to be refilled and sent prescriptions for those that are empty. -     amLODIPine (NORVASC) 10 mg tablet; Take 1 Tab by mouth daily. Indications: high blood pressure 
-     losartan-hydroCHLOROthiazide (HYZAAR) 100-12.5 mg per tablet; Take 1 Tab by mouth daily. -     carvedilol (COREG) 12.5 mg tablet; Take 1 Tab by mouth two (2) times daily (with meals). 4. Hyperlipidemia, mixed Hyperlipidemia is controlled 
-     atorvastatin (LIPITOR) 80 mg tablet; TAKE ONE TABLET ONCE DAILY 
-     LIPID PANEL; Future -     METABOLIC PANEL, COMPREHENSIVE; Future 5. Glucose intolerance (impaired glucose tolerance) A1c remains in prediabetes range.  
-     HEMOGLOBIN A1C WITH EAG; Future 6.  Coronary artery disease involving native coronary artery of native heart without angina pectoris Stable taking antiplatelet agent and statin. 7. Obesity (BMI 30.0-34. 9) Discussed using smaller plate for portion control, keeping a food diary for awareness of food consumed, checking weight often, and increasing physical activity. Will re-evaluate next visit. 8. Screening for depression-negative -     [address] [address] [address] 9. Lumbar stenosis with neurogenic claudication Will decrease duloxetine dose with eye toward future discontinuation unless pain increases. -     Decrease DULoxetine (CYMBALTA) 30 mg capsule; Take 1 Cap by mouth daily. Follow-up and Dispositions · Return in about 6 weeks (around 5/23/2019) for HTN, chol, gluc   Fasting lab one week prior  . lab results and schedule of future lab studies reviewed with patient 
reviewed diet, exercise and weight control I have discussed the diagnosis, evaluation and treatment options and the intended plan with the patient. Patient understands and is in agreement. The patient has received an after-visit summary and questions were answered concerning future plans. I have discussed side effects and warnings of any new medications with the patient as well.

## 2019-04-11 NOTE — PROGRESS NOTES
This is the Subsequent Medicare Annual Wellness Exam, performed 12 months or more after the Initial AWV or the last Subsequent AWV I have reviewed the patient's medical history in detail and updated the computerized patient record. History Past Medical History:  
Diagnosis Date  A-fib ([facility] [address]  Arthritis  CAD (coronary artery disease) 12/24/2013  Cataract  Diverticulosis 9/22/2009  Dyslipidemia 9/22/2009  Hemorrhoids 9/22/2009  
 HTN 9/22/2009  Hypercholesterolemia  Impotence 9/22/2009  MI (myocardial infarction) ([facility] [address] 12/2013 CABG X 3   
 Nausea & vomiting  Neuropathy 9/22/2009  Open angle glaucoma with borderline intraocular pressure  Vitamin D deficiency 9/22/2009 Past Surgical History:  
Procedure Laterality Date  CARDIAC SURG PROCEDURE UNLIST CABG X 3  
 ENDOSCOPY, COLON, DIAGNOSTIC  [id number]  HX CATARACT REMOVAL Bilateral 09/13/2017  HX CORONARY ARTERY BYPASS GRAFT  12/2013  
 triple  HX HERNIA REPAIR Bilateral   
 HX LITHOTRIPSY  [id number]  HX ORTHOPAEDIC    
 back surgery Current Outpatient Medications Medication Sig Dispense Refill  naproxen sodium (ALEVE) 220 mg cap Take  by mouth.  raNITIdine (ZANTAC) 300 mg tab 300 mg nightly.  atorvastatin (LIPITOR) 80 mg tablet TAKE ONE TABLET ONCE DAILY 30 Tab 2  carvedilol (COREG) 12.5 mg tablet Take 1 Tab by mouth two (2) times daily (with meals). 60 Tab 11  
 DULoxetine (CYMBALTA) 60 mg capsule take 1 capsule by mouth once daily 30 Cap 5  
 acetaminophen (TYLENOL) 500 mg tablet Take 1,000 mg by mouth daily as needed for Pain.  aspirin 81 mg chewable tablet Take 1 Tab by mouth nightly. 90 Tab 3  
 amLODIPine (NORVASC) 10 mg tablet Take 1 Tab by mouth daily. Indications: hypertension 30 Tab 11 Allergies Allergen Reactions  Percocet [Oxycodone-Acetaminophen] Other (comments) \"feel weird\" Family History Problem Relation Age of Onset  Hypertension Mother  Diabetes Mother  Cancer Father LIVER  Alcohol abuse Brother  Diabetes Sister  Hypertension Sister  Arthritis-osteo Sister  Kidney Disease Sister DIALYSIS  
 Alcohol abuse Brother  Suicide Brother  No Known Problems Brother  No Known Problems Brother  Dementia Brother  Hypertension Son   
[facility] Hypertension Son  Anesth Problems Neg Hx Social History Tobacco Use  Smoking status: Never Smoker  Smokeless tobacco: Never Used Substance Use Topics  Alcohol use: No  
 
Patient Active Problem List  
Diagnosis Code  Hyperlipidemia, mixed E78.2  Impotence N52.9  Diverticulosis K57.90  
 Hemorrhoids K64.9  Vitamin D deficiency E55.9  Benign hypertension with chronic kidney disease, stage III ([facility]) I12.9, N18.3  GERD (gastroesophageal reflux disease) K21.9  Intermittent angle-closure glaucoma H40.239  Coronary artery disease involving native coronary artery of native heart without angina pectoris I25.10  Nephrolithiasis N20.0  Left median nerve neuropathy G56.12  
 CKD (chronic kidney disease) stage 3, GFR 30-59 ml/min ([facility]) N18.3  Advance directive discussed with patient Z70.80  Lumbar stenosis with neurogenic claudication M48.062  
 Unequal blood pressure in upper extremities R09.89  Glucose intolerance (impaired glucose tolerance) R73.02  
 Combined forms of age-related cataract of right eye H25.811  
 Combined forms of age-related cataract of left eye H25.812  Spinal stenosis M48.00  Obesity (BMI 30.0-34. 9) E66.9 Depression Risk Factor Screening:  
 
3 most recent PHQ Screens 4/11/2019 Little interest or pleasure in doing things Not at all Feeling down, depressed, irritable, or hopeless Nearly every day Total Score PHQ 2 3 Alcohol Risk Factor Screening: You do not drink alcohol or very rarely. Functional Ability and Level of Safety:  
Hearing Loss Hearing is good. Activities of Daily Living The home contains: handrails and grab bars Patient does total self care Fall Risk Fall Risk Assessment, last 12 mths 4/11/2019 Able to walk? Yes Fall in past 12 months? No  
Fall with injury? -  
Number of falls in past 12 months - Fall Risk Score -  
 
 
Abuse Screen Patient is not abused Cognitive Screening Evaluation of Cognitive Function: 
Has your family/caregiver stated any concerns about your memory: no 
Normal 
 
Patient Care Team  
Patient Care Team: 
Brody Cheek MD as PCP - General (Internal Medicine) Oz Ugalde DO as Consulting Provider (Ophthalmology) Robyn Martin MD as Consulting Provider (Orthopedic Surgery) Assessment/Plan Education and counseling provided: 
End-of-Life planning (with patient's consent) Health Maintenance Due Topic Date Due  
 DTaP/Tdap/Td series (1 - Tdap) 09/06/1960  Shingrix Vaccine Age 50> (1 of 2) 09/06/1989  MEDICARE YEARLY EXAM  03/30/2019

## 2019-04-11 NOTE — PATIENT INSTRUCTIONS
Restart amlodipine 10 mg daily Stop lisinopril/hydrochlorothiazide and continue losartan/hydrochlorothiazide 100/12.5 mg 
Decrease duloxetine to 30 mg daily with plans to discontinue in near future as long as back pain does not get worse. I have refilled atorvastatin which is to continue at 80 mg once a day Carvedilol 12.5 mg must be taken twice a day. Stop ranitidine; if heartburn occurs, we can re-order this. Continue aspirin 81 mg daily. Do not take Aleve but may use Tylenol. Return in 6-8 weeks. Low Sodium Diet (2,000 Milligram): Care Instructions Your Care Instructions Too much sodium causes your body to hold on to extra water. This can raise your blood pressure and force your heart and kidneys to work harder. In very serious cases, this could cause you to be put in the hospital. It might even be life-threatening. By limiting sodium, you will feel better and lower your risk of serious problems. The most common source of sodium is salt. People get most of the salt in their diet from canned, prepared, and packaged foods. Fast food and restaurant meals also are very high in sodium. Your doctor will probably limit your sodium to less than 2,000 milligrams (mg) a day. This limit counts all the sodium in prepared and packaged foods and any salt you add to your food. Follow-up care is a key part of your treatment and safety. Be sure to make and go to all appointments, and call your doctor if you are having problems. It's also a good idea to know your test results and keep a list of the medicines you take. How can you care for yourself at home? Read food labels · Read labels on cans and food packages. The labels tell you how much sodium is in each serving. Make sure that you look at the serving size. If you eat more than the serving size, you have eaten more sodium. · Food labels also tell you the Percent Daily Value for sodium.  Choose products with low Percent Daily Values for sodium. · Be aware that sodium can come in forms other than salt, including monosodium glutamate (MSG), sodium citrate, and sodium bicarbonate (baking soda). MSG is often added to Asian food. When you eat out, you can sometimes ask for food without MSG or added salt. Buy low-sodium foods · Buy foods that are labeled \"unsalted\" (no salt added), \"sodium-free\" (less than 5 mg of sodium per serving), or \"low-sodium\" (less than 140 mg of sodium per serving). Foods labeled \"reduced-sodium\" and \"light sodium\" may still have too much sodium. Be sure to read the label to see how much sodium you are getting. · Buy fresh vegetables, or frozen vegetables without added sauces. Buy low-sodium versions of canned vegetables, soups, and other canned goods. Prepare low-sodium meals · Cut back on the amount of salt you use in cooking. This will help you adjust to the taste. Do not add salt after cooking. One teaspoon of salt has about 2,300 mg of sodium. · Take the salt shaker off the table. · Flavor your food with garlic, lemon juice, onion, vinegar, herbs, and spices. Do not use soy sauce, lite soy sauce, steak sauce, onion salt, garlic salt, celery salt, mustard, or ketchup on your food. · Use low-sodium salad dressings, sauces, and ketchup. Or make your own salad dressings and sauces without adding salt. · Use less salt (or none) when recipes call for it. You can often use half the salt a recipe calls for without losing flavor. Other foods such as rice, pasta, and grains do not need added salt. · Rinse canned vegetables, and cook them in fresh water. This removes somebut not allof the salt. · Avoid water that is naturally high in sodium or that has been treated with water softeners, which add sodium. Call your local water company to find out the sodium content of your water supply. If you buy bottled water, read the label and choose a sodium-free brand. Avoid high-sodium foods · Avoid eating: 
? Smoked, cured, salted, and canned meat, fish, and poultry. ? Ham, garcia, hot dogs, and luncheon meats. ? Regular, hard, and processed cheese and regular peanut butter. ? Crackers with salted tops, and other salted snack foods such as pretzels, chips, and salted popcorn. ? Frozen prepared meals, unless labeled low-sodium. ? Canned and dried soups, broths, and bouillon, unless labeled sodium-free or low-sodium. ? Canned vegetables, unless labeled sodium-free or low-sodium. ? Wilner Bertha fries, pizza, tacos, and other fast foods. ? Pickles, olives, ketchup, and other condiments, especially soy sauce, unless labeled sodium-free or low-sodium. Where can you learn more? Go to http://bridget-primo.info/. Enter W594 in the search box to learn more about \"Low Sodium Diet (2,000 Milligram): Care Instructions. \" Current as of: March 28, 2018 Content Version: 11.9 © 9130-5238 [employer]. Care instructions adapted under license by [employer] (which disclaims liability or warranty for this information). If you have questions about a medical condition or this instruction, always ask your healthcare professional. [place] [address] any warranty or liability for your use of this information. How to Read a Food Label to Limit Sodium: Care Instructions Your Care Instructions Sodium causes your body to hold on to extra water. This can raise your blood pressure and force your heart and kidneys to work harder. In very serious cases, this could cause you to be put in the hospital. It might even be life-threatening. By limiting sodium, you will feel better and lower your risk of serious problems. Processed foods, fast food, and restaurant foods are the major sources of dietary sodium. The most common name for sodium is salt. Try to limit how much sodium you eat to less than 2,300 milligrams (mg) a day.  If you limit your sodium to 1,500 mg a day, you can lower your blood pressure even more. This limit counts all the salt that you eat in foods you cook or in packaged foods. Keep a list of everything you eat and drink. Follow-up care is a key part of your treatment and safety. Be sure to make and go to all appointments, and call your doctor if you are having problems. It's also a good idea to know your test results and keep a list of the medicines you take. How can you care for yourself at home? Read ingredient lists on food labels · Read the list of ingredients on food labels to help you find how much sodium is in a food. The label lists the ingredients in a food in descending order (from the most to the least). If salt or sodium is high on the list, there may be a lot of sodium in the food. · Know that sodium has different names. Sodium is also called monosodium glutamate (MSG, common in [facility] food), sodium citrate, sodium alginate, sodium hydroxide, and sodium phosphate. Read Nutrition Facts labels · On most foods, there is a Nutrition Facts label. This will tell you how much sodium is in one serving of food. Look at both the serving size and the sodium amount. The serving size is located at the top of the label, usually right under the \"Nutrition Facts\" title. The amount of sodium is given in the list under the title. It is given in milligrams (mg). ? Check the serving size carefully. A single serving is often very small, and you may eat more than one serving. If this is the case, you will eat more sodium than listed on the label. For example, if the serving size for a canned soup is 1 cup and the sodium amount is 470 mg, if you have 2 cups you will eat 940 mg of sodium. · The nutrition facts for fresh fruits and vegetables are not listed on the food. They may be listed somewhere in the store. These foods usually have no sodium or low sodium.  
· The Nutrition Facts label also gives you the Percent Daily Value for sodium. This is how much of the recommended amount of sodium a serving contains. The daily value for sodium is less than 2,300 mg. So if the Percent Daily Value says 50%, this means one serving is giving you half of this, or 1,150 mg. Buy low-sodium foods · Look for foods that are made with less sodium. Watch for the following words on the label. ? \"Unsalted\" means there is no sodium added to the food. But there may be sodium already in the food naturally. ? \"Sodium-free\" means a serving has less than 5 mg of sodium. ? \"Very low sodium\" means a serving has 35 mg or less of sodium. ? \"Low-sodium\" means a serving has 140 mg or less of sodium. · \"Reduced-sodium\" means that there is 25% less sodium than what the food normally has. This is still usually too much sodium. Try not to buy foods with this on the label. · Buy fresh vegetables, or frozen vegetables without added sauces. Buy low-sodium versions of canned vegetables, soups, and other canned goods. Where can you learn more? Go to http://bridget-primo.info/. Enter 26 [id number] in the search box to learn more about \"How to Read a Food Label to Limit Sodium: Care Instructions. \" Current as of: March 28, 2018 Content Version: 11.9 © 4920-5683 [employer]. Care instructions adapted under license by [employer] (which disclaims liability or warranty for this information). If you have questions about a medical condition or this instruction, always ask your healthcare professional. [place] [address] any warranty or liability for your use of this information.

## 2019-05-14 DIAGNOSIS — E78.2 HYPERLIPIDEMIA, MIXED: ICD-10-CM

## 2019-05-14 DIAGNOSIS — R73.02 GLUCOSE INTOLERANCE (IMPAIRED GLUCOSE TOLERANCE): ICD-10-CM

## 2019-05-21 ENCOUNTER — OFFICE VISIT (OUTPATIENT)
Dept: INTERNAL MEDICINE CLINIC | Facility: CLINIC | Age: 79
End: 2019-05-21

## 2019-05-21 VITALS
SYSTOLIC BLOOD PRESSURE: 158 MMHG | WEIGHT: 195 LBS | OXYGEN SATURATION: 97 % | BODY MASS INDEX: 29.55 KG/M2 | TEMPERATURE: 98 F | HEIGHT: 68 IN | RESPIRATION RATE: 12 BRPM | DIASTOLIC BLOOD PRESSURE: 86 MMHG | HEART RATE: 70 BPM

## 2019-05-21 DIAGNOSIS — R73.02 GLUCOSE INTOLERANCE (IMPAIRED GLUCOSE TOLERANCE): ICD-10-CM

## 2019-05-21 DIAGNOSIS — E78.2 HYPERLIPIDEMIA, MIXED: ICD-10-CM

## 2019-05-21 DIAGNOSIS — N18.30 BENIGN HYPERTENSION WITH CHRONIC KIDNEY DISEASE, STAGE III (HCC): Primary | ICD-10-CM

## 2019-05-21 DIAGNOSIS — I12.9 BENIGN HYPERTENSION WITH CHRONIC KIDNEY DISEASE, STAGE III (HCC): Primary | ICD-10-CM

## 2019-05-21 LAB — HBA1C MFR BLD HPLC: 5.4 %

## 2019-05-21 RX ORDER — HYDROCODONE BITARTRATE AND ACETAMINOPHEN 5; 325 MG/1; MG/1
TABLET ORAL
COMMUNITY
Start: 2019-05-16 | End: 2019-11-26

## 2019-05-21 RX ORDER — GABAPENTIN 300 MG/1
CAPSULE ORAL
COMMUNITY
Start: 2019-05-16 | End: 2019-11-26

## 2019-05-21 NOTE — PATIENT INSTRUCTIONS
Use weekly medication box: one for morning and one for evening. Be sure to take medication every day. Return in 4-6 weeks. High Blood Pressure: Care Instructions  Overview    It's normal for blood pressure to go up and down throughout the day. But if it stays up, you have high blood pressure. Another name for high blood pressure is hypertension. Despite what a lot of people think, high blood pressure usually doesn't cause headaches or make you feel dizzy or lightheaded. It usually has no symptoms. But it does increase your risk of stroke, heart attack, and other problems. You and your doctor will talk about your risks of these problems based on your blood pressure. Your doctor will give you a goal for your blood pressure. Your goal will be based on your health and your age. Lifestyle changes, such as eating healthy and being active, are always important to help lower blood pressure. You might also take medicine to reach your blood pressure goal.  Follow-up care is a key part of your treatment and safety. Be sure to make and go to all appointments, and call your doctor if you are having problems. It's also a good idea to know your test results and keep a list of the medicines you take. How can you care for yourself at home? Medical treatment  · If you stop taking your medicine, your blood pressure will go back up. You may take one or more types of medicine to lower your blood pressure. Be safe with medicines. Take your medicine exactly as prescribed. Call your doctor if you think you are having a problem with your medicine. · Talk to your doctor before you start taking aspirin every day. Aspirin can help certain people lower their risk of a heart attack or stroke. But taking aspirin isn't right for everyone, because it can cause serious bleeding. · See your doctor regularly. You may need to see the doctor more often at first or until your blood pressure comes down.   · If you are taking blood pressure medicine, talk to your doctor before you take decongestants or anti-inflammatory medicine, such as ibuprofen. Some of these medicines can raise blood pressure. · Learn how to check your blood pressure at home. Lifestyle changes  · Stay at a healthy weight. This is especially important if you put on weight around the waist. Losing even 10 pounds can help you lower your blood pressure. · If your doctor recommends it, get more exercise. Walking is a good choice. Bit by bit, increase the amount you walk every day. Try for at least 30 minutes on most days of the week. You also may want to swim, bike, or do other activities. · Avoid or limit alcohol. Talk to your doctor about whether you can drink any alcohol. · Try to limit how much sodium you eat to less than 2,300 milligrams (mg) a day. Your doctor may ask you to try to eat less than 1,500 mg a day. · Eat plenty of fruits (such as bananas and oranges), vegetables, legumes, whole grains, and low-fat dairy products. · Lower the amount of saturated fat in your diet. Saturated fat is found in animal products such as milk, cheese, and meat. Limiting these foods may help you lose weight and also lower your risk for heart disease. · Do not smoke. Smoking increases your risk for heart attack and stroke. If you need help quitting, talk to your doctor about stop-smoking programs and medicines. These can increase your chances of quitting for good. When should you call for help? Call 911 anytime you think you may need emergency care. This may mean having symptoms that suggest that your blood pressure is causing a serious heart or blood vessel problem. Your blood pressure may be over 180/120.   For example, call 911 if:    · You have symptoms of a heart attack. These may include:  ? Chest pain or pressure, or a strange feeling in the chest.  ? Sweating. ? Shortness of breath. ? Nausea or vomiting.   ? Pain, pressure, or a strange feeling in the back, neck, jaw, or upper belly or in one or both shoulders or arms. ? Lightheadedness or sudden weakness. ? A fast or irregular heartbeat.     · You have symptoms of a stroke. These may include:  ? Sudden numbness, tingling, weakness, or loss of movement in your face, arm, or leg, especially on only one side of your body. ? Sudden vision changes. ? Sudden trouble speaking. ? Sudden confusion or trouble understanding simple statements. ? Sudden problems with walking or balance. ? A sudden, severe headache that is different from past headaches.     · You have severe back or belly pain.    Do not wait until your blood pressure comes down on its own. Get help right away.   Call your doctor now or seek immediate care if:    · Your blood pressure is much higher than normal (such as 180/120 or higher), but you don't have symptoms.     · You think high blood pressure is causing symptoms, such as:  ? Severe headache.  ? Blurry vision.    Watch closely for changes in your health, and be sure to contact your doctor if:    · Your blood pressure measures higher than your doctor recommends at least 2 times. That means the top number is higher or the bottom number is higher, or both.     · You think you may be having side effects from your blood pressure medicine. Where can you learn more? Go to http://bridget-primo.info/. Enter Q834 in the search box to learn more about \"High Blood Pressure: Care Instructions. \"  Current as of: July 22, 2018  Content Version: 11.9  © 2862-1662 [employer]. Care instructions adapted under license by [employer] (which disclaims liability or warranty for this information). If you have questions about a medical condition or this instruction, always ask your healthcare professional. [place] [address] any warranty or liability for your use of this information.

## 2019-05-21 NOTE — PROGRESS NOTES
HISTORY OF PRESENT ILLNESS  Mac Perez is a 77 y.o. male. HPI  He presents for follow up of hypertension with CKD, hyperlipidemia, coronary artery disease with hx of MI and CABG, obesity and glucose intolerance. He was last seen April 11. He had stopped taking amlodipine and gotten mixed up on other blood pressure medication. Blood pressure  was 176/90. Amldipine was restarted and medicaiton list clarified. He still has medications confused and has not been taking all of them as directed. Diet and Lifestyle: generally follows a low fat low cholesterol diet, generally follows a low sodium diet, follows a diabetic diet regularly, sedentary, nonsmoker  Medication compliance: non-compliant a good deal of the time  Medication side effects: none  Home BP Monitoring: not done  Cardiovascular ROS:  He complains of lower extremity edema.     He denies palpitations, exertional chest pressure/discomfort, claudication, dyspnea on exertion, dizziness     Patient Active Problem List   Diagnosis Code    Hyperlipidemia, mixed E78.2    Impotence N52.9    Diverticulosis K57.90    Hemorrhoids K64.9    Vitamin D deficiency E55.9    Benign hypertension with chronic kidney disease, stage III ([facility]) I12.9, N18.3    GERD (gastroesophageal reflux disease) K21.9    Intermittent angle-closure glaucoma H40.239    Coronary artery disease involving native coronary artery of native heart without angina pectoris I25.10    Nephrolithiasis N20.0    Left median nerve neuropathy G56.12    CKD (chronic kidney disease) stage 3, GFR 30-59 ml/min ([facility]) N18.3    Advance directive discussed with patient Z71.89    Lumbar stenosis with neurogenic claudication M48.062    Unequal blood pressure in upper extremities R09.89    Glucose intolerance (impaired glucose tolerance) R73.02    Combined forms of age-related cataract of right eye H25.811    Combined forms of age-related cataract of left eye H25.812    Spinal stenosis M48.00    Obesity (BMI 30.0-34. 9) E66.9     Past Medical History:   Diagnosis Date    [facility]fib [facility] [address]     Arthritis     CAD (coronary artery disease) 12/24/2013    Cataract     Diverticulosis 9/22/2009    Dyslipidemia 9/22/2009    Hemorrhoids 9/22/2009    HTN 9/22/2009    Hypercholesterolemia     Impotence 9/22/2009    MI (myocardial infarction) ([facility] [address] 12/2013    CABG X 3     Nausea & vomiting     Neuropathy 9/22/2009    Open angle glaucoma with borderline intraocular pressure     Vitamin D deficiency 9/22/2009     Past Surgical History:   Procedure Laterality Date    CARDIAC SURG PROCEDURE UNLIST      CABG X 3    ENDOSCOPY, COLON, DIAGNOSTIC  810853    HX CATARACT REMOVAL Bilateral 09/13/2017    HX CORONARY ARTERY BYPASS GRAFT  12/2013    triple    HX HERNIA REPAIR Bilateral     HX LITHOTRIPSY  [id number]    HX ORTHOPAEDIC      back surgery     Social History     Socioeconomic History    Marital status:      Spouse name: Not on file    Number of children: Not on file    Years of education: Not on file    Highest education level: Not on file   Tobacco Use    Smoking status: Never Smoker    Smokeless tobacco: Never Used   Substance and Sexual Activity    Alcohol use: No    Drug use: No    Sexual activity: Yes     Partners: Female     Family History   Problem Relation Age of Onset    Hypertension Mother     Diabetes Mother     Cancer Father         LIVER    Alcohol abuse Brother     Diabetes Sister     Hypertension Sister     Arthritis-osteo Sister     Kidney Disease Sister         DIALYSIS    Alcohol abuse Brother     Suicide Brother     No Known Problems Brother     No Known Problems Brother     Dementia Brother     Hypertension Son    Moe Holms Hypertension Son     Anesth Problems Neg Hx      Allergies   Allergen Reactions    Percocet [Oxycodone-Acetaminophen] Other (comments)     \"feel weird\"     Current Outpatient Medications   Medication Sig Dispense Refill    gabapentin (NEURONTIN) 300 mg capsule       HYDROcodone-acetaminophen (NORCO) 5-325 mg per tablet       amLODIPine (NORVASC) 10 mg tablet Take 1 Tab by mouth daily. Indications: high blood pressure 30 Tab 11    losartan-hydroCHLOROthiazide (HYZAAR) 100-12.5 mg per tablet Take 1 Tab by mouth daily. 30 Tab 11    DULoxetine (CYMBALTA) 30 mg capsule Take 1 Cap by mouth daily. 30 Cap 5    atorvastatin (LIPITOR) 80 mg tablet TAKE ONE TABLET ONCE DAILY 30 Tab 11    carvedilol (COREG) 12.5 mg tablet Take 1 Tab by mouth two (2) times daily (with meals). 60 Tab 11    acetaminophen (TYLENOL) 500 mg tablet Take 1,000 mg by mouth daily as needed for Pain.  aspirin 81 mg chewable tablet Take 1 Tab by mouth nightly. 90 Tab 3       Review of Systems   Musculoskeletal: Positive for back pain (into right buttock and thigh). Neurological: Negative for focal weakness. Psychiatric/Behavioral: Negative for suicidal ideas. Visit Vitals  /86 (BP 1 Location: Left arm, BP Patient Position: Sitting)   Pulse 70   Temp 98 °F (36.7 °C) (Oral)   Resp 12   Ht 5' 8\" (1.727 m)   Wt 195 lb (88.5 kg)   SpO2 97%   BMI 29.65 kg/m²     Physical Exam   Constitutional: He is oriented to person, place, and time. He appears well-developed and well-nourished. HENT:   Head: Normocephalic and atraumatic. Eyes: Pupils are equal, round, and reactive to light. Conjunctivae are normal.   Neck: Neck supple. Carotid bruit is not present. No thyromegaly present. Cardiovascular: Normal rate, regular rhythm and normal heart sounds. PMI is not displaced. Exam reveals no gallop. No murmur heard. Pulses:       Dorsalis pedis pulses are 2+ on the right side, and 2+ on the left side. Posterior tibial pulses are 2+ on the right side, and 2+ on the left side. Pulmonary/Chest: Effort normal. He has no wheezes. He has no rhonchi. He has no rales. Abdominal: Soft. Normal appearance. He exhibits no abdominal bruit and no mass. There is no hepatosplenomegaly. There is no tenderness. Musculoskeletal: He exhibits no edema. Lymphadenopathy:     He has no cervical adenopathy. Right: No supraclavicular adenopathy present. Left: No supraclavicular adenopathy present. Neurological: He is alert and oriented to person, place, and time. No sensory deficit. Skin: Skin is warm, dry and intact. No rash noted. Psychiatric: He has a normal mood and affect. His behavior is normal.   Nursing note and vitals reviewed. Results for orders placed or performed in visit on 05/21/19   AMB POC HEMOGLOBIN A1C   Result Value Ref Range    Hemoglobin A1c (POC) 5.4 %     Lab Results   Component Value Date/Time    Hemoglobin A1c 5.9 (H) 08/13/2018 01:35 PM    Hemoglobin A1c (POC) 5.5 04/17/2017 10:15 AM     Lab Results   Component Value Date/Time    Cholesterol, total 127 03/29/2018 09:42 AM    HDL Cholesterol 40 03/29/2018 09:42 AM    LDL, calculated 66 03/29/2018 09:42 AM    VLDL, calculated 21 03/29/2018 09:42 AM    Triglyceride 104 03/29/2018 09:42 AM    CHOL/HDL Ratio 2.7 07/09/2010 09:43 AM     Lab Results   Component Value Date/Time    Sodium 143 09/01/2018 04:02 AM    Potassium 3.8 09/01/2018 04:02 AM    Chloride 112 (H) 09/01/2018 04:02 AM    CO2 21 09/01/2018 04:02 AM    Anion gap 10 09/01/2018 04:02 AM    Glucose 105 (H) 09/01/2018 04:02 AM    BUN 19 09/01/2018 04:02 AM    Creatinine 1.03 09/01/2018 04:02 AM    BUN/Creatinine ratio 18 09/01/2018 04:02 AM    GFR est AA >60 09/01/2018 04:02 AM    GFR est non-AA >60 09/01/2018 04:02 AM    Calcium 7.4 (L) 09/01/2018 04:02 AM    Bilirubin, total 0.6 07/05/2018 09:14 AM    AST (SGOT) 29 07/05/2018 09:14 AM    Alk. phosphatase 71 07/05/2018 09:14 AM    Protein, total 6.7 07/05/2018 09:14 AM    Albumin 3.6 07/05/2018 09:14 AM    Globulin 3.1 07/05/2018 09:14 AM    A-G Ratio 1.2 07/05/2018 09:14 AM    ALT (SGPT) 30 07/05/2018 09:14 AM       ASSESSMENT and PLAN    ICD-10-CM ICD-9-CM    1.  Benign hypertension with chronic kidney disease, stage III (HCC) I12.9 403.10     N18.3 585.3    2. Hyperlipidemia, mixed E78.2 272.2 LIPID PANEL      METABOLIC PANEL, COMPREHENSIVE   3. Glucose intolerance (impaired glucose tolerance) R73.02 790.22 AMB POC HEMOGLOBIN A1C     Diagnoses and all orders for this visit:    1. Benign hypertension with chronic kidney disease, stage III (HCC)  Blood pressure is at not goal but  creatinine is stable. Needs to imrove compliance with medication. 2. Hyperlipidemia, mixed  Hyperlipidemia is controlled  -     LIPID PANEL  -     METABOLIC PANEL, COMPREHENSIVE    3. Glucose intolerance (impaired glucose tolerance)  A1c is stable to imroved. -     AMB POC HEMOGLOBIN A1C      Follow-up and Dispositions    · Return in about 1 month (around 6/18/2019) for HTN .       lab results and schedule of future lab studies reviewed with patient  reviewed diet, exercise and weight control  I have discussed the diagnosis, evaluation and treatment options and the intended plan with the patient. Patient understands and is in agreement. The patient has received an after-visit summary and questions were answered concerning future plans. I have discussed side effects and warnings of any new medications with the patient as well.

## 2019-05-21 NOTE — PROGRESS NOTES
Nikita Thakur 73  Identified pt with two pt identifiers(name and ). Chief Complaint   Patient presents with    Hypertension    Cholesterol Problem    Blood sugar problem       Reviewed record In preparation for visit and have obtained necessary documentation. Has info on advanced directive but has not filled them out. 1. Have you been to the ER, urgent care clinic or hospitalized since your last visit? No     2. Have you seen or consulted any other health care providers outside of the [address] [place] [address] since your last visit? Include any pap smears or colon screening. Dr Porsha Lee reviewed with provider.     Health Maintenance reviewed:     Health Maintenance Due   Topic    DTaP/Tdap/Td series (1 - Tdap)    Shingrix Vaccine Age 50> (1 of 2)          Wt Readings from Last 3 Encounters:   19 195 lb (88.5 kg)   19 198 lb (89.8 kg)   10/01/18 201 lb 6.4 oz (91.4 kg)        Temp Readings from Last 3 Encounters:   19 98 °F (36.7 °C) (Oral)   19 98.2 °F (36.8 °C) (Oral)   10/01/18 98.3 °F (36.8 °C) (Oral)        BP Readings from Last 3 Encounters:   19 163/90   19 176/90   10/01/18 138/72        Pulse Readings from Last 3 Encounters:   19 70   19 67   10/01/18 69        Vitals:    19 1011   BP: 163/90   Pulse: 70   Resp: 12   Temp: 98 °F (36.7 °C)   TempSrc: Oral   SpO2: 97%   Weight: 195 lb (88.5 kg)   Height: 5' 8\" (1.727 m)   PainSc:   7   PainLoc: Back          Learning Assessment:   :       Learning Assessment 10/14/2014   PRIMARY LEARNER Patient   HIGHEST LEVEL OF EDUCATION - PRIMARY LEARNER  GRADUATED HIGH SCHOOL OR GED   PRIMARY LANGUAGE ENGLISH   LEARNER PREFERENCE PRIMARY LISTENING   ANSWERED BY self   RELATIONSHIP SELF        Depression Screening:   :       3 most recent PHQ Screens 2019   Little interest or pleasure in doing things Not at all   Feeling down, depressed, irritable, or hopeless Nearly every day   Total Score PHQ 2 3        Fall Risk Assessment:   :       Fall Risk Assessment, last 12 mths 4/11/2019   Able to walk? Yes   Fall in past 12 months? No   Fall with injury? -   Number of falls in past 12 months -   Fall Risk Score -        Abuse Screening:   :       Abuse Screening Questionnaire 4/11/2019 3/29/2018 3/10/2017 3/29/2016 3/12/2015   Do you ever feel afraid of your partner? N N N N N   Are you in a relationship with someone who physically or mentally threatens you? N N N N N   Is it safe for you to go home?  Y Y Y Y Y        ADL Screening:   :       ADL Assessment 3/12/2015   Feeding yourself No Help Needed   Getting from bed to chair No Help Needed   Getting dressed No Help Needed   Bathing or showering No Help Needed   Walk across the room (includes cane/walker) No Help Needed   Using the telphone No Help Needed   Taking your medications No Help Needed   Preparing meals No Help Needed   Managing money (expenses/bills) No Help Needed   Moderately strenuous housework (laundry) No Help Needed   Shopping for personal items (toiletries/medicines) No Help Needed   Shopping for groceries No Help Needed   Driving No Help Needed   Climbing a flight of stairs No Help Needed   Getting to places beyond walking distances No Help Needed

## 2019-05-22 LAB
ALBUMIN SERPL-MCNC: 4.2 G/DL (ref 3.5–4.8)
ALBUMIN/GLOB SERPL: 1.8 {RATIO} (ref 1.2–2.2)
ALP SERPL-CCNC: 75 IU/L (ref 39–117)
ALT SERPL-CCNC: 29 IU/L (ref 0–44)
AST SERPL-CCNC: 38 IU/L (ref 0–40)
BILIRUB SERPL-MCNC: 0.4 MG/DL (ref 0–1.2)
BUN SERPL-MCNC: 15 MG/DL (ref 8–27)
BUN/CREAT SERPL: 14 (ref 10–24)
CALCIUM SERPL-MCNC: 9.3 MG/DL (ref 8.6–10.2)
CHLORIDE SERPL-SCNC: 106 MMOL/L (ref 96–106)
CHOLEST SERPL-MCNC: 155 MG/DL (ref 100–199)
CO2 SERPL-SCNC: 24 MMOL/L (ref 20–29)
CREAT SERPL-MCNC: 1.07 MG/DL (ref 0.76–1.27)
GLOBULIN SER CALC-MCNC: 2.3 G/DL (ref 1.5–4.5)
GLUCOSE SERPL-MCNC: 94 MG/DL (ref 65–99)
HDLC SERPL-MCNC: 49 MG/DL
LDLC SERPL CALC-MCNC: 81 MG/DL (ref 0–99)
POTASSIUM SERPL-SCNC: 4.4 MMOL/L (ref 3.5–5.2)
PROT SERPL-MCNC: 6.5 G/DL (ref 6–8.5)
SODIUM SERPL-SCNC: 144 MMOL/L (ref 134–144)
TRIGL SERPL-MCNC: 127 MG/DL (ref 0–149)
VLDLC SERPL CALC-MCNC: 25 MG/DL (ref 5–40)

## 2019-05-28 ENCOUNTER — HOSPITAL ENCOUNTER (OUTPATIENT)
Dept: MRI IMAGING | Age: 79
Discharge: HOME | End: 2019-05-28
Attending: ORTHOPAEDIC SURGERY
Payer: MEDICARE

## 2019-05-28 ENCOUNTER — HOSPITAL ENCOUNTER (EMERGENCY)
Age: 79
Discharge: HOME | End: 2019-05-28
Attending: EMERGENCY MEDICINE | Admitting: EMERGENCY MEDICINE
Payer: MEDICARE

## 2019-05-28 VITALS
TEMPERATURE: 98.3 F | DIASTOLIC BLOOD PRESSURE: 75 MMHG | OXYGEN SATURATION: 96 % | HEART RATE: 93 BPM | RESPIRATION RATE: 16 BRPM | SYSTOLIC BLOOD PRESSURE: 134 MMHG | BODY MASS INDEX: 28.23 KG/M2 | HEIGHT: 68 IN | WEIGHT: 186.29 LBS

## 2019-05-28 DIAGNOSIS — Z98.890 S/P LUMBAR LAMINECTOMY: ICD-10-CM

## 2019-05-28 DIAGNOSIS — R11.2 NAUSEA AND VOMITING, INTRACTABILITY OF VOMITING NOT SPECIFIED, UNSPECIFIED VOMITING TYPE: Primary | ICD-10-CM

## 2019-05-28 DIAGNOSIS — G89.29 CHRONIC LOW BACK PAIN: ICD-10-CM

## 2019-05-28 DIAGNOSIS — M54.50 CHRONIC LOW BACK PAIN: ICD-10-CM

## 2019-05-28 LAB
ALBUMIN SERPL-MCNC: 3.8 G/DL (ref 3.5–5)
ALBUMIN/GLOB SERPL: 1 {RATIO} (ref 1.1–2.2)
ALP SERPL-CCNC: 77 U/L (ref 45–117)
ALT SERPL-CCNC: 45 U/L (ref 12–78)
ANION GAP SERPL CALC-SCNC: 6 MMOL/L (ref 5–15)
APPEARANCE UR: ABNORMAL
AST SERPL-CCNC: 37 U/L (ref 15–37)
BACTERIA URNS QL MICRO: ABNORMAL /HPF
BASOPHILS # BLD: 0 K/UL (ref 0–0.1)
BASOPHILS NFR BLD: 0 % (ref 0–1)
BILIRUB SERPL-MCNC: 0.8 MG/DL (ref 0.2–1)
BILIRUB UR QL CFM: NEGATIVE
BUN SERPL-MCNC: 29 MG/DL (ref 6–20)
BUN/CREAT SERPL: 19 (ref 12–20)
CALCIUM SERPL-MCNC: 8.9 MG/DL (ref 8.5–10.1)
CHLORIDE SERPL-SCNC: 110 MMOL/L (ref 97–108)
CO2 SERPL-SCNC: 25 MMOL/L (ref 21–32)
COLOR UR: ABNORMAL
CREAT SERPL-MCNC: 1.53 MG/DL (ref 0.7–1.3)
DIFFERENTIAL METHOD BLD: ABNORMAL
EOSINOPHIL # BLD: 0 K/UL (ref 0–0.4)
EOSINOPHIL NFR BLD: 0 % (ref 0–7)
EPITH CASTS URNS QL MICRO: ABNORMAL /LPF
ERYTHROCYTE [DISTWIDTH] IN BLOOD BY AUTOMATED COUNT: 12.9 % (ref 11.5–14.5)
GLOBULIN SER CALC-MCNC: 3.7 G/DL (ref 2–4)
GLUCOSE SERPL-MCNC: 127 MG/DL (ref 65–100)
GLUCOSE UR STRIP.AUTO-MCNC: NEGATIVE MG/DL
HCT VFR BLD AUTO: 47.1 % (ref 36.6–50.3)
HGB BLD-MCNC: 16 G/DL (ref 12.1–17)
HGB UR QL STRIP: NEGATIVE
HYALINE CASTS URNS QL MICRO: ABNORMAL /LPF (ref 0–5)
IMM GRANULOCYTES # BLD AUTO: 0.1 K/UL (ref 0–0.04)
IMM GRANULOCYTES NFR BLD AUTO: 1 % (ref 0–0.5)
KETONES UR QL STRIP.AUTO: ABNORMAL MG/DL
LEUKOCYTE ESTERASE UR QL STRIP.AUTO: NEGATIVE
LYMPHOCYTES # BLD: 0.2 K/UL (ref 0.8–3.5)
LYMPHOCYTES NFR BLD: 3 % (ref 12–49)
MCH RBC QN AUTO: 29.9 PG (ref 26–34)
MCHC RBC AUTO-ENTMCNC: 34 G/DL (ref 30–36.5)
MCV RBC AUTO: 87.9 FL (ref 80–99)
MONOCYTES # BLD: 0.5 K/UL (ref 0–1)
MONOCYTES NFR BLD: 6 % (ref 5–13)
NEUTS SEG # BLD: 7 K/UL (ref 1.8–8)
NEUTS SEG NFR BLD: 90 % (ref 32–75)
NITRITE UR QL STRIP.AUTO: NEGATIVE
NRBC # BLD: 0 K/UL (ref 0–0.01)
NRBC BLD-RTO: 0 PER 100 WBC
OTHER,OTHU: ABNORMAL
PH UR STRIP: 5 [PH] (ref 5–8)
PLATELET # BLD AUTO: 162 K/UL (ref 150–400)
PMV BLD AUTO: 11 FL (ref 8.9–12.9)
POTASSIUM SERPL-SCNC: 4 MMOL/L (ref 3.5–5.1)
PROT SERPL-MCNC: 7.5 G/DL (ref 6.4–8.2)
PROT UR STRIP-MCNC: 100 MG/DL
RBC # BLD AUTO: 5.36 M/UL (ref 4.1–5.7)
RBC #/AREA URNS HPF: ABNORMAL /HPF (ref 0–5)
RBC MORPH BLD: ABNORMAL
SODIUM SERPL-SCNC: 141 MMOL/L (ref 136–145)
SP GR UR REFRACTOMETRY: 1.02 (ref 1–1.03)
UA: UC IF INDICATED,UAUC: ABNORMAL
UROBILINOGEN UR QL STRIP.AUTO: 1 EU/DL (ref 0.2–1)
WBC # BLD AUTO: 7.8 K/UL (ref 4.1–11.1)
WBC URNS QL MICRO: ABNORMAL /HPF (ref 0–4)

## 2019-05-28 PROCEDURE — 80053 COMPREHEN METABOLIC PANEL: CPT

## 2019-05-28 PROCEDURE — 74011250636 HC RX REV CODE- 250/636: Performed by: EMERGENCY MEDICINE

## 2019-05-28 PROCEDURE — 74011250636 HC RX REV CODE- 250/636

## 2019-05-28 PROCEDURE — 85025 COMPLETE CBC W/AUTO DIFF WBC: CPT

## 2019-05-28 PROCEDURE — 96374 THER/PROPH/DIAG INJ IV PUSH: CPT

## 2019-05-28 PROCEDURE — 96361 HYDRATE IV INFUSION ADD-ON: CPT

## 2019-05-28 PROCEDURE — 81001 URINALYSIS AUTO W/SCOPE: CPT

## 2019-05-28 PROCEDURE — 87086 URINE CULTURE/COLONY COUNT: CPT

## 2019-05-28 PROCEDURE — 99284 EMERGENCY DEPT VISIT MOD MDM: CPT

## 2019-05-28 PROCEDURE — 36415 COLL VENOUS BLD VENIPUNCTURE: CPT

## 2019-05-28 PROCEDURE — 72148 MRI LUMBAR SPINE W/O DYE: CPT

## 2019-05-28 RX ORDER — ONDANSETRON 2 MG/ML
4 INJECTION INTRAMUSCULAR; INTRAVENOUS
Status: COMPLETED | OUTPATIENT
Start: 2019-05-28 | End: 2019-05-28

## 2019-05-28 RX ORDER — ONDANSETRON 4 MG/1
4 TABLET, ORALLY DISINTEGRATING ORAL
Qty: 10 TAB | Refills: 0 | Status: SHIPPED | OUTPATIENT
Start: 2019-05-28 | End: 2019-11-26

## 2019-05-28 RX ORDER — ONDANSETRON 2 MG/ML
INJECTION INTRAMUSCULAR; INTRAVENOUS
Status: COMPLETED
Start: 2019-05-28 | End: 2019-05-28

## 2019-05-28 RX ADMIN — SODIUM CHLORIDE 1000 ML: 900 INJECTION, SOLUTION INTRAVENOUS at 08:18

## 2019-05-28 RX ADMIN — ONDANSETRON 4 MG: 2 INJECTION INTRAMUSCULAR; INTRAVENOUS at 08:18

## 2019-05-28 NOTE — ED NOTES
Dr. Ashwin Fonseca reviewed discharge instructions with the patient. The patient verbalized understanding. All questions and concerns were addressed. The patient declined a wheelchair and is discharged ambulatory in the care of family members with instructions and prescriptions in hand. Pt is alert and oriented x 4. Respirations are clear and unlabored.

## 2019-05-28 NOTE — ED PROVIDER NOTES
EMERGENCY DEPARTMENT HISTORY AND PHYSICAL EXAM      Date: 5/28/2019  Patient Name: Alistair Dunlap    History of Presenting Illness     Chief Complaint   Patient presents with    Vomiting     He states he has not been able to keep anything down since Sunday night. He was scheduled for a MRI today for lower back pain.  Nausea       History Provided By: Patient    HPI: Alistair Dunlap, 77 y.o. male presents ambulatory to the ED with cc of nausea and vomiting that started on Sunday that is prevented him from being able to keep food or water down. There are no exacerbating or relieving factors and he has not treated with anything. He specifically denies fever, chills, chest pain, shortness of breath, rash, diarrhea, headache, night sweats, weight loss. There are no other complaints, changes, or physical findings at this time. PCP: Daxa Horton MD    No current facility-administered medications on file prior to encounter. Current Outpatient Medications on File Prior to Encounter   Medication Sig Dispense Refill    gabapentin (NEURONTIN) 300 mg capsule       HYDROcodone-acetaminophen (NORCO) 5-325 mg per tablet       amLODIPine (NORVASC) 10 mg tablet Take 1 Tab by mouth daily. Indications: high blood pressure 30 Tab 11    losartan-hydroCHLOROthiazide (HYZAAR) 100-12.5 mg per tablet Take 1 Tab by mouth daily. 30 Tab 11    DULoxetine (CYMBALTA) 30 mg capsule Take 1 Cap by mouth daily. 30 Cap 5    atorvastatin (LIPITOR) 80 mg tablet TAKE ONE TABLET ONCE DAILY 30 Tab 11    carvedilol (COREG) 12.5 mg tablet Take 1 Tab by mouth two (2) times daily (with meals). 60 Tab 11    acetaminophen (TYLENOL) 500 mg tablet Take 1,000 mg by mouth daily as needed for Pain.  aspirin 81 mg chewable tablet Take 1 Tab by mouth nightly.  80 Tab 3       Past History     Past Medical History:  Past Medical History:   Diagnosis Date    [facility]fib ([facility] [address]     Arthritis     CAD (coronary artery disease) 12/24/2013  Cataract     Diverticulosis 9/22/2009    Dyslipidemia 9/22/2009    Hemorrhoids 9/22/2009    HTN 9/22/2009    Hypercholesterolemia     Impotence 9/22/2009    MI (myocardial infarction) ([facility] [address] 12/2013    CABG X 3     Nausea & vomiting     Neuropathy 9/22/2009    Open angle glaucoma with borderline intraocular pressure     Vitamin D deficiency 9/22/2009       Past Surgical History:  Past Surgical History:   Procedure Laterality Date    CARDIAC SURG PROCEDURE UNLIST      CABG X 3    ENDOSCOPY, COLON, DIAGNOSTIC  [id number]    HX CATARACT REMOVAL Bilateral 09/13/2017    HX CORONARY ARTERY BYPASS GRAFT  12/2013    triple    HX HERNIA REPAIR Bilateral     HX LITHOTRIPSY  [id number]    HX ORTHOPAEDIC      back surgery       Family History:  Family History   Problem Relation Age of Onset    Hypertension Mother     Diabetes Mother     Cancer Father         LIVER    Alcohol abuse Brother     Diabetes Sister     Hypertension Sister     Arthritis-osteo Sister     Kidney Disease Sister         DIALYSIS    Alcohol abuse Brother     Suicide Brother     No Known Problems Brother     No Known Problems Brother     Dementia Brother     Hypertension Son     Hypertension Son     Anesth Problems Neg Hx        Social History:  Social History     Tobacco Use    Smoking status: Never Smoker    Smokeless tobacco: Never Used   Substance Use Topics    Alcohol use: No    Drug use: No       Allergies: Allergies   Allergen Reactions    Percocet [Oxycodone-Acetaminophen] Other (comments)     \"feel weird\"         Review of Systems   Review of Systems   Constitutional: Negative. Negative for activity change, appetite change, chills, fatigue, fever and unexpected weight change. HENT: Negative. Negative for congestion, hearing loss, rhinorrhea, sneezing and voice change. Eyes: Negative. Negative for pain and visual disturbance. Respiratory: Negative.   Negative for apnea, cough, choking, chest tightness and shortness of breath. Cardiovascular: Negative. Negative for chest pain and palpitations. Gastrointestinal: Positive for nausea and vomiting. Negative for abdominal distention, abdominal pain, blood in stool and diarrhea. Genitourinary: Negative. Negative for difficulty urinating, flank pain, frequency and urgency. No discharge   Musculoskeletal: Negative. Negative for arthralgias, back pain, myalgias and neck stiffness. Skin: Negative. Negative for color change and rash. Neurological: Negative. Negative for dizziness, seizures, syncope, speech difficulty, weakness, numbness and headaches. Hematological: Negative for adenopathy. Psychiatric/Behavioral: Negative. Negative for agitation, behavioral problems, dysphoric mood and suicidal ideas. The patient is not nervous/anxious. Physical Exam   Physical Exam   Constitutional: He is oriented to person, place, and time. He appears well-developed and well-nourished. No distress. HENT:   Head: Normocephalic and atraumatic. Mouth/Throat: Oropharynx is clear and moist. No oropharyngeal exudate. Eyes: Pupils are equal, round, and reactive to light. Conjunctivae and EOM are normal. Right eye exhibits no discharge. Left eye exhibits no discharge. Neck: Normal range of motion. Neck supple. Cardiovascular: Normal rate, regular rhythm and intact distal pulses. Exam reveals no gallop and no friction rub. No murmur heard. Pulmonary/Chest: Effort normal and breath sounds normal. No respiratory distress. He has no wheezes. He has no rales. He exhibits no tenderness. Abdominal: Soft. Bowel sounds are normal. He exhibits no distension and no mass. There is no tenderness. There is no rebound and no guarding. Musculoskeletal: Normal range of motion. He exhibits no edema. Lymphadenopathy:     He has no cervical adenopathy. Neurological: He is alert and oriented to person, place, and time. No cranial nerve deficit.  Coordination normal. Skin: Skin is warm and dry. No rash noted. No erythema. Psychiatric: He has a normal mood and affect. Nursing note and vitals reviewed. Diagnostic Study Results     Labs -   No results found for this or any previous visit (from the past 12 hour(s)). Radiologic Studies -   No orders to display     CT Results  (Last 48 hours)    None        CXR Results  (Last 48 hours)    None            Medical Decision Making   I am the first provider for this patient. I reviewed the vital signs, available nursing notes, past medical history, past surgical history, family history and social history. Vital Signs-Reviewed the patient's vital signs. No data found. Pulse Oximetry Analysis - 98% on ra      Records Reviewed: Nursing Notes and Old Medical Records    Provider Notes (Medical Decision Making):   Mr. Olivia Brandon is a pleasant 78-year-old male who presents with episodes of nausea and vomiting since Sunday unable to keep anything down. Will assess with basic labs and treat with IV fluid and antiemetic. ED Course:   Initial assessment performed. The patients presenting problems have been discussed, and they are in agreement with the care plan formulated and outlined with them. I have encouraged them to ask questions as they arise throughout their visit. Disposition:  Patient states that he is feeling much better at this time would like to be discharged. PLAN:  1. Discharge Medication List as of 5/28/2019 10:37 AM      START taking these medications    Details   ondansetron (ZOFRAN ODT) 4 mg disintegrating tablet Take 1 Tab by mouth every eight (8) hours as needed for Nausea., Normal, Disp-10 Tab, R-0         CONTINUE these medications which have NOT CHANGED    Details   gabapentin (NEURONTIN) 300 mg capsule Historical Med      HYDROcodone-acetaminophen (NORCO) 5-325 mg per tablet Historical Med      amLODIPine (NORVASC) 10 mg tablet Take 1 Tab by mouth daily.  Indications: high blood pressure, Normal, Disp-30 Tab, R-11      losartan-hydroCHLOROthiazide (HYZAAR) 100-12.5 mg per tablet Take 1 Tab by mouth daily. , Normal, Disp-30 Tab, R-11      DULoxetine (CYMBALTA) 30 mg capsule Take 1 Cap by mouth daily. , Normal, Disp-30 Cap, R-5      atorvastatin (LIPITOR) 80 mg tablet TAKE ONE TABLET ONCE DAILY, Normal, Disp-30 Tab, R-11      carvedilol (COREG) 12.5 mg tablet Take 1 Tab by mouth two (2) times daily (with meals). , NormalInstruct him to take 1 twice a day in place of half of 25 mg tabletDisp-60 Tab, R-11      acetaminophen (TYLENOL) 500 mg tablet Take 1,000 mg by mouth daily as needed for Pain., Historical Med      aspirin 81 mg chewable tablet Take 1 Tab by mouth nightly., No Print, Disp-90 Tab, R-3           2. Follow-up Information     Follow up With Specialties Details Why Contact Info    Manuel Handley MD Internal Medicine Call in 2 days As needed, If symptoms worsen [address]  [phone]          Return to ED if worse     Diagnosis     Clinical Impression:   1.  Nausea and vomiting, intractability of vomiting not specified, unspecified vomiting type        Attestations:

## 2019-05-28 NOTE — DISCHARGE INSTRUCTIONS
Patient Education        Nausea and Vomiting: Care Instructions  Your Care Instructions    When you are nauseated, you may feel weak and sweaty and notice a lot of saliva in your mouth. Nausea often leads to vomiting. Most of the time you do not need to worry about nausea and vomiting, but they can be signs of other illnesses. Two common causes of nausea and vomiting are stomach flu and food poisoning. Nausea and vomiting from viral stomach flu will usually start to improve within 24 hours. Nausea and vomiting from food poisoning may last from 12 to 48 hours. The doctor has checked you carefully, but problems can develop later. If you notice any problems or new symptoms, get medical treatment right away. Follow-up care is a key part of your treatment and safety. Be sure to make and go to all appointments, and call your doctor if you are having problems. It's also a good idea to know your test results and keep a list of the medicines you take. How can you care for yourself at home? · To prevent dehydration, drink plenty of fluids, enough so that your urine is light yellow or clear like water. Choose water and other caffeine-free clear liquids until you feel better. If you have kidney, heart, or liver disease and have to limit fluids, talk with your doctor before you increase the amount of fluids you drink. · Rest in bed until you feel better. · When you are able to eat, try clear soups, mild foods, and liquids until all symptoms are gone for 12 to 48 hours. Other good choices include dry toast, crackers, cooked cereal, and gelatin dessert, such as Jell-O. When should you call for help? Call 911 anytime you think you may need emergency care. For example, call if:    · You passed out (lost consciousness).    Call your doctor now or seek immediate medical care if:    · You have symptoms of dehydration, such as:  ? Dry eyes and a dry mouth. ? Passing only a little dark urine. ?  Feeling thirstier than usual.   · You have new or worsening belly pain.     · You have a new or higher fever.     · You vomit blood or what looks like coffee grounds.    Watch closely for changes in your health, and be sure to contact your doctor if:    · You have ongoing nausea and vomiting.     · Your vomiting is getting worse.     · Your vomiting lasts longer than 2 days.     · You are not getting better as expected. Where can you learn more? Go to http://bridget-prmio.info/. Enter 25 [id number] in the search box to learn more about \"Nausea and Vomiting: Care Instructions. \"  Current as of: September 23, 2018  Content Version: 11.9  © 8073-8238 [employer], [employer]. Care instructions adapted under license by [employer] (which disclaims liability or warranty for this information). If you have questions about a medical condition or this instruction, always ask your healthcare professional. [address] any warranty or liability for your use of this information.

## 2019-05-29 LAB
BACTERIA SPEC CULT: NORMAL
CC UR VC: NORMAL
SERVICE CMNT-IMP: NORMAL

## 2019-11-25 PROBLEM — E66.9 OBESITY (BMI 30.0-34.9): Status: RESOLVED | Noted: 2018-10-01 | Resolved: 2019-11-25

## 2019-11-26 ENCOUNTER — OFFICE VISIT (OUTPATIENT)
Dept: INTERNAL MEDICINE CLINIC | Facility: CLINIC | Age: 79
End: 2019-11-26

## 2019-11-26 VITALS
TEMPERATURE: 98 F | BODY MASS INDEX: 29.55 KG/M2 | HEIGHT: 68 IN | SYSTOLIC BLOOD PRESSURE: 113 MMHG | HEART RATE: 73 BPM | RESPIRATION RATE: 16 BRPM | OXYGEN SATURATION: 97 % | WEIGHT: 195 LBS | DIASTOLIC BLOOD PRESSURE: 65 MMHG

## 2019-11-26 DIAGNOSIS — R73.02 GLUCOSE INTOLERANCE (IMPAIRED GLUCOSE TOLERANCE): ICD-10-CM

## 2019-11-26 DIAGNOSIS — I12.9 BENIGN HYPERTENSION WITH CHRONIC KIDNEY DISEASE, STAGE III (HCC): Primary | ICD-10-CM

## 2019-11-26 DIAGNOSIS — I25.10 CORONARY ARTERY DISEASE INVOLVING NATIVE CORONARY ARTERY OF NATIVE HEART WITHOUT ANGINA PECTORIS: ICD-10-CM

## 2019-11-26 DIAGNOSIS — E66.3 OVERWEIGHT (BMI 25.0-29.9): ICD-10-CM

## 2019-11-26 DIAGNOSIS — N18.30 BENIGN HYPERTENSION WITH CHRONIC KIDNEY DISEASE, STAGE III (HCC): Primary | ICD-10-CM

## 2019-11-26 DIAGNOSIS — Z23 ENCOUNTER FOR IMMUNIZATION: ICD-10-CM

## 2019-11-26 DIAGNOSIS — E78.2 HYPERLIPIDEMIA, MIXED: ICD-10-CM

## 2019-11-26 NOTE — PATIENT INSTRUCTIONS
Vaccine Information Statement Influenza (Flu) Vaccine (Inactivated or Recombinant): What You Need to Know Many Vaccine Information Statements are available in Amharic and other languages. See www.immunize.org/vis Hojas de información sobre vacunas están disponibles en español y en muchos otros idiomas. Visite www.immunize.org/vis 1. Why get vaccinated? Influenza vaccine can prevent influenza (flu). Flu is a contagious disease that spreads around the [facility] every year, usually between October and May. Anyone can get the flu, but it is more dangerous for some people. Infants and young children, people 72years of age and older, pregnant women, and people with certain health conditions or a weakened immune system are at greatest risk of flu complications. Pneumonia, bronchitis, sinus infections and ear infections are examples of flu-related complications. If you have a medical condition, such as heart disease, cancer or diabetes, flu can make it worse. Flu can cause fever and chills, sore throat, muscle aches, fatigue, cough, headache, and runny or stuffy nose. Some people may have vomiting and diarrhea, though this is more common in children than adults. Each year thousands of people in the [facility] die from flu, and many more are hospitalized. Flu vaccine prevents millions of illnesses and flu-related visits to the doctor each year. 2. Influenza vaccines CDC recommends everyone 10months of age and older get vaccinated every flu season. Children 6 months through 6years of age may need 2 doses during a single flu season. Everyone else needs only 1 dose each flu season. It takes about 2 weeks for protection to develop after vaccination. There are many flu viruses, and they are always changing. Each year a new flu vaccine is made to protect against three or four viruses that are likely to cause disease in the upcoming flu season.  Even when the vaccine doesnt exactly match these viruses, it may still provide some protection. Influenza vaccine does not cause flu. Influenza vaccine may be given at the same time as other vaccines. 3. Talk with your health care provider Tell your vaccine provider if the person getting the vaccine: 
 Has had an allergic reaction after a previous dose of influenza vaccine, or has any severe, life-threatening allergies.  Has ever had Guillain-Barré Syndrome (also called GBS). In some cases, your health care provider may decide to postpone influenza vaccination to a future visit. People with minor illnesses, such as a cold, may be vaccinated. People who are moderately or severely ill should usually wait until they recover before getting influenza vaccine. Your health care provider can give you more information. 4. Risks of a reaction  Soreness, redness, and swelling where shot is given, fever, muscle aches, and headache can happen after influenza vaccine.  There may be a very small increased risk of Guillain-Barré Syndrome (GBS) after inactivated influenza vaccine (the flu shot). [facility] children who get the flu shot along with pneumococcal vaccine (PCV13), and/or DTaP vaccine at the same time might be slightly more likely to have a seizure caused by fever. Tell your health care provider if a child who is getting flu vaccine has ever had a seizure. People sometimes faint after medical procedures, including vaccination. Tell your provider if you feel dizzy or have vision changes or ringing in the ears. As with any medicine, there is a very remote chance of a vaccine causing a severe allergic reaction, other serious injury, or death. 5. What if there is a serious problem? An allergic reaction could occur after the vaccinated person leaves the clinic.  If you see signs of a severe allergic reaction (hives, swelling of the face and throat, difficulty breathing, a fast heartbeat, dizziness, or weakness), call 9-1-1 and get the person to the nearest hospital. 
 
For other signs that concern you, call your health care provider. Adverse reactions should be reported to the Vaccine Adverse Event Reporting System (VAERS). Your health care provider will usually file this report, or you can do it yourself. Visit the VAERS website at www.vaers. hhs.gov or call 6-864.959.1242. VAERS is only for reporting reactions, and VAERS staff do not give medical advice. 6. The National Vaccine Injury Compensation Program 
 
The [facility] Vaccine Injury Compensation Program (VICP) is a federal program that was created to compensate people who may have been injured by certain vaccines. Visit the VICP website at www.[facility].gov/vaccinecompensation or call 6-333.611.5212 to learn about the program and about filing a claim. There is a time limit to file a claim for compensation. 7. How can I learn more?  Ask your health care provider.  Call your local or state health department.  Contact the Centers for Disease Control and Prevention (CDC): 
- Call 2-188.222.8454 (1-800-CDC-INFO) or 
- Visit CDCs influenza website at www.cdc.gov/flu Vaccine Information Statement (Interim) Inactivated Influenza Vaccine 8/15/2019 
42 MARINA Fregoso [id number] Department of Health and [facility] Centers for Disease Control and Prevention Office Use Only

## 2019-11-26 NOTE — PROGRESS NOTES
Nikita Thakur 73  Identified pt with two pt identifiers(name and ). Chief Complaint   Patient presents with    Hypertension    Immunization/Injection   Flu vaccine Fluad 0.5 ml given left deltoid IM. Allergies checked and consent signed by patient. VIS given. Patient tolerated procedure with no adverse side effects. 1. Have you been to the ER, urgent care clinic since your last visit? Hospitalized since your last visit? NO    2. Have you seen or consulted any other health care providers outside of the [address] [place] [address] since your last visit? Include any pap smears or colon screening. NO    Today's provider has been notified of reason for visit, vitals and flowsheets obtained on patients.      Patient received paperwork for advance directive during previous visit but has not completed at this time     Reviewed record In preparation for visit, huddled with provider and have obtained necessary documentation      Health Maintenance Due   Topic    DTaP/Tdap/Td series (1 - Tdap)    Shingrix Vaccine Age 50> (1 of 2)    GLAUCOMA SCREENING Q2Y     Influenza Age 5 to Adult        Wt Readings from Last 3 Encounters:   19 195 lb (88.5 kg)   19 186 lb 4.6 oz (84.5 kg)   19 195 lb (88.5 kg)     Temp Readings from Last 3 Encounters:   19 98 °F (36.7 °C) (Oral)   19 98.3 °F (36.8 °C)   19 98 °F (36.7 °C) (Oral)     BP Readings from Last 3 Encounters:   19 113/65   19 134/75   19 158/86     Pulse Readings from Last 3 Encounters:   19 73   19 93   19 70     Vitals:    19 1321   BP: 113/65   Pulse: 73   Resp: 16   Temp: 98 °F (36.7 °C)   TempSrc: Oral   SpO2: 97%   Weight: 195 lb (88.5 kg)   Height: 5' 8\" (1.727 m)   PainSc:   0 - No pain         Learning Assessment:  :     Learning Assessment 10/14/2014   PRIMARY LEARNER Patient   HIGHEST LEVEL OF EDUCATION - PRIMARY LEARNER  GRADUATED HIGH SCHOOL OR GED   PRIMARY LANGUAGE ENGLISH LEARNER PREFERENCE PRIMARY LISTENING   ANSWERED BY self   RELATIONSHIP SELF       Depression Screening:  :     3 most recent PHQ Screens 4/11/2019   Little interest or pleasure in doing things Not at all   Feeling down, depressed, irritable, or hopeless Nearly every day   Total Score PHQ 2 3       Fall Risk Assessment:  :     Fall Risk Assessment, last 12 mths 4/11/2019   Able to walk? Yes   Fall in past 12 months? No   Fall with injury? -   Number of falls in past 12 months -   Fall Risk Score -       Abuse Screening:  :     Abuse Screening Questionnaire 4/11/2019 3/29/2018 3/10/2017 3/29/2016 3/12/2015   Do you ever feel afraid of your partner? N N N N N   Are you in a relationship with someone who physically or mentally threatens you? N N N N N   Is it safe for you to go home? Y Y Y Y Y       ADL Screening:  :     ADL Assessment 3/12/2015   Feeding yourself No Help Needed   Getting from bed to chair No Help Needed   Getting dressed No Help Needed   Bathing or showering No Help Needed   Walk across the room (includes cane/walker) No Help Needed   Using the telphone No Help Needed   Taking your medications No Help Needed   Preparing meals No Help Needed   Managing money (expenses/bills) No Help Needed   Moderately strenuous housework (laundry) No Help Needed   Shopping for personal items (toiletries/medicines) No Help Needed   Shopping for groceries No Help Needed   Driving No Help Needed   Climbing a flight of stairs No Help Needed   Getting to places beyond walking distances No Help Needed         Per Dr. Karen Carroll verbal order read back orders placed for flu vaccine. Medication reconciliation up to date and corrected with patient at this time.

## 2019-11-26 NOTE — PROGRESS NOTES
HISTORY OF PRESENT ILLNESS  Shekhar Rosenberg is a [de-identified] y.o. male. HPI  He presents for follow up of hypertension with CKD, hyperlipidemia, coronary artery disease with hx of MI and CABG, obesity and glucose intolerance. He was last seen in May and blood pressure was 158/86. He had gotten confused regarding antihypertensive medication and was not taking amlodipine. He was to return a month later, but has not returned until now. Diet and Lifestyle: generally follows a low fat low cholesterol diet, not attempting to follow a low sodium diet, sedentary, nonsmoker  Medication compliance: compliant all of the time  Medication side effects: none  Home BP Monitoring: not done  Cardiovascular ROS:  He complains of claudication.  (from back)  He denies palpitations, orthopnea, exertional chest pressure/discomfort, lower extremity edema, dyspnea on exertion, dizziness     Back pain is gone after cortisone shots, but he has developed problems with balance and had some falls. Legs feel weak.      Patient Active Problem List   Diagnosis Code    Hyperlipidemia, mixed E78.2    Impotence N52.9    Diverticulosis K57.90    Hemorrhoids K64.9    Vitamin D deficiency E55.9    Benign hypertension with chronic kidney disease, stage III ([facility] [address] I12.9, N18.3    GERD (gastroesophageal reflux disease) K21.9    Intermittent angle-closure glaucoma H40.239    Coronary artery disease involving native coronary artery of native heart without angina pectoris I25.10    Nephrolithiasis N20.0    Left median nerve neuropathy G56.12    CKD (chronic kidney disease) stage 3, GFR 30-59 ml/min ([facility]) N18.3    Lumbar stenosis with neurogenic claudication M48.062    Unequal blood pressure in upper extremities R09.89    Glucose intolerance (impaired glucose tolerance) R73.02    Combined forms of age-related cataract of right eye H25.811    Combined forms of age-related cataract of left eye H25.812    Spinal stenosis M48.00     Past Medical History:   Diagnosis Date    A-fib ([facility]     Arthritis     CAD (coronary artery disease) 12/24/2013    Cataract     Diverticulosis 9/22/2009    Dyslipidemia 9/22/2009    Hemorrhoids 9/22/2009    HTN 9/22/2009    Hypercholesterolemia     Impotence 9/22/2009    MI (myocardial infarction) ([facility] 12/2013    CABG X 3     Nausea & vomiting     Neuropathy 9/22/2009    Open angle glaucoma with borderline intraocular pressure     Vitamin D deficiency 9/22/2009     Past Surgical History:   Procedure Laterality Date    CARDIAC SURG PROCEDURE UNLIST      CABG X 3    ENDOSCOPY, COLON, DIAGNOSTIC  [id number]    HX CATARACT REMOVAL Bilateral 09/13/2017    HX CORONARY ARTERY BYPASS GRAFT  12/2013    triple    HX HERNIA REPAIR Bilateral     HX LITHOTRIPSY  [id number]    HX ORTHOPAEDIC      back surgery     Social History     Socioeconomic History    Marital status:      Spouse name: Not on file    Number of children: Not on file    Years of education: Not on file    Highest education level: Not on file   Tobacco Use    Smoking status: Never Smoker    Smokeless tobacco: Never Used   Substance and Sexual Activity    Alcohol use: No    Drug use: No    Sexual activity: Yes     Partners: Female     Family History   Problem Relation Age of Onset    Hypertension Mother     Diabetes Mother     Cancer Father         LIVER    Alcohol abuse Brother     Diabetes Sister     Hypertension Sister     Arthritis-osteo Sister     Kidney Disease Sister         DIALYSIS    Alcohol abuse Brother     Suicide Brother     No Known Problems Brother     No Known Problems Brother     Dementia Brother     Hypertension Son     Hypertension Son     Anesth Problems Neg Hx      Allergies   Allergen Reactions    Percocet [Oxycodone-Acetaminophen] Other (comments)     \"feel weird\"     Current Outpatient Medications   Medication Sig Dispense Refill    amLODIPine (NORVASC) 10 mg tablet Take 1 Tab by mouth daily. Indications: high blood pressure 30 Tab 11    losartan-hydroCHLOROthiazide (HYZAAR) 100-12.5 mg per tablet Take 1 Tab by mouth daily. 30 Tab 11    DULoxetine (CYMBALTA) 30 mg capsule Take 1 Cap by mouth daily. 30 Cap 5    atorvastatin (LIPITOR) 80 mg tablet TAKE ONE TABLET ONCE DAILY 30 Tab 11    carvedilol (COREG) 12.5 mg tablet Take 1 Tab by mouth two (2) times daily (with meals). 60 Tab 11    acetaminophen (TYLENOL) 500 mg tablet Take 1,000 mg by mouth daily as needed for Pain.  aspirin 81 mg chewable tablet Take 1 Tab by mouth nightly. 90 Tab 3             Review of Systems   Constitutional: Positive for malaise/fatigue. Negative for weight loss. Gastrointestinal: Negative for constipation and diarrhea. Musculoskeletal: Positive for back pain. Negative for joint pain. Neurological: Positive for focal weakness (legs, left hand). Negative for tingling. Visit Vitals  /65 (BP 1 Location: Left arm, BP Patient Position: Sitting)   Pulse 73   Temp 98 °F (36.7 °C) (Oral)   Resp 16   Ht 5' 8\" (1.727 m)   Wt 195 lb (88.5 kg)   SpO2 97%   BMI 29.65 kg/m²     Physical Exam  Vitals signs and nursing note reviewed. Constitutional:       Appearance: Normal appearance. HENT:      Head: Normocephalic and atraumatic. Mouth/Throat:      Lips: Pink. Mouth: Mucous membranes are moist.   Eyes:      General: Lids are normal. Gaze aligned appropriately. Extraocular Movements: Extraocular movements intact. Conjunctiva/sclera: Conjunctivae normal.   Neck:      Musculoskeletal: Neck supple. Thyroid: No thyroid mass or thyromegaly. Vascular: No carotid bruit. Trachea: Trachea and phonation normal.   Cardiovascular:      Rate and Rhythm: Normal rate and regular rhythm. Chest Wall: PMI is not displaced. Pulses:           Dorsalis pedis pulses are 2+ on the right side and 2+ on the left side.         Posterior tibial pulses are 2+ on the right side and 2+ on the left side.      Heart sounds: S1 normal and S2 normal. No murmur. No gallop. Pulmonary:      Effort: Pulmonary effort is normal.      Breath sounds: Normal breath sounds and air entry. No wheezing, rhonchi or rales. Abdominal:      General: Abdomen is flat. Bowel sounds are normal. There is no distension or abdominal bruit. Palpations: Abdomen is soft. There is no hepatomegaly, splenomegaly or mass. Tenderness: There is no tenderness. Musculoskeletal:      Right lower leg: No edema. Left lower leg: No edema. Lymphadenopathy:      Cervical: No cervical adenopathy. Skin:     General: Skin is warm and dry. Findings: No rash. Neurological:      General: No focal deficit present. Mental Status: He is alert and oriented to person, place, and time. Sensory: Sensation is intact. Gait: Gait is intact.    Psychiatric:         Attention and Perception: Attention and perception normal.         Mood and Affect: Mood normal.         Speech: Speech normal.         Behavior: Behavior normal.       Lab Results   Component Value Date/Time    Hemoglobin A1c 5.9 (H) 08/13/2018 01:35 PM    Hemoglobin A1c (POC) 5.4 05/21/2019 10:09 AM       Lab Results   Component Value Date/Time    Cholesterol, total 155 05/21/2019 10:49 AM    HDL Cholesterol 49 05/21/2019 10:49 AM    LDL, calculated 81 05/21/2019 10:49 AM    VLDL, calculated 25 05/21/2019 10:49 AM    Triglyceride 127 05/21/2019 10:49 AM    CHOL/HDL Ratio 2.7 07/09/2010 09:43 AM     Lab Results   Component Value Date/Time    Sodium 141 05/28/2019 07:58 AM    Potassium 4.0 05/28/2019 07:58 AM    Chloride 110 (H) 05/28/2019 07:58 AM    CO2 25 05/28/2019 07:58 AM    Anion gap 6 05/28/2019 07:58 AM    Glucose 127 (H) 05/28/2019 07:58 AM    BUN 29 (H) 05/28/2019 07:58 AM    Creatinine 1.53 (H) 05/28/2019 07:58 AM    BUN/Creatinine ratio 19 05/28/2019 07:58 AM    GFR est AA 53 (L) 05/28/2019 07:58 AM    GFR est non-AA 44 (L) 05/28/2019 07:58 AM Calcium 8.9 05/28/2019 07:58 AM    Bilirubin, total 0.8 05/28/2019 07:58 AM    AST (SGOT) 37 05/28/2019 07:58 AM    Alk. phosphatase 77 05/28/2019 07:58 AM    Protein, total 7.5 05/28/2019 07:58 AM    Albumin 3.8 05/28/2019 07:58 AM    Globulin 3.7 05/28/2019 07:58 AM    A-G Ratio 1.0 (L) 05/28/2019 07:58 AM    ALT (SGPT) 45 05/28/2019 07:58 AM         ASSESSMENT and PLAN    ICD-10-CM ICD-9-CM    1. Benign hypertension with chronic kidney disease, stage III (HCC) I12.9 403.10     N18.3 585.3    2. Coronary artery disease involving native coronary artery of native heart without angina pectoris I25.10 414.01    3. Glucose intolerance (impaired glucose tolerance) R73.02 790.22 HEMOGLOBIN A1C WITH EAG   4. Hyperlipidemia, mixed E78.2 272.2 LIPID PANEL      METABOLIC PANEL, COMPREHENSIVE   5. Overweight (BMI 25.0-29. 9) E66.3 278.02    6. Encounter for immunization Z23 V03.89 INFLUENZA VACCINE INACTIVATED (IIV), SUBUNIT, ADJUVANTED, IM      ADMIN INFLUENZA VIRUS VAC     Diagnoses and all orders for this visit:    1. Benign hypertension with chronic kidney disease, stage III (HCC)  Hypertension is controlled and creat is stable. .    2. Coronary artery disease involving native coronary artery of native heart without angina pectoris  Stable taking antiplatelet agent and statin. 3. Glucose intolerance (impaired glucose tolerance)  A1c stable in prediabetes zone  -     HEMOGLOBIN A1C WITH EAG; Future    4. Hyperlipidemia, mixed  Hyperlipidemia is controlled  -     LIPID PANEL; Future  -     METABOLIC PANEL, COMPREHENSIVE; Future    5. Overweight (BMI 25.0-29. 9)  Discussed using smaller plate for portion control, keeping a food diary for awareness of food consumed, checking weight often, and increasing physical activity. Will re-evaluate next visit.      6. Encounter for immunization  -     INFLUENZA VACCINE INACTIVATED (IIV), SUBUNIT, ADJUVANTED, IM  -     ADMIN INFLUENZA VIRUS VAC      Follow-up and Dispositions · Return in about 6 months (around 5/26/2020) for HTN, chol, gluc, CAD   Fasting lab one week prior  . lab results and schedule of future lab studies reviewed with patient  reviewed diet, exercise and weight control  I have discussed the diagnosis, evaluation and treatment options and the intended plan with the patient. Patient understands and is in agreement. The patient has received an after-visit summary and questions were answered concerning future plans. I have discussed side effects and warnings of any new medications with the patient as well.

## 2020-03-13 ENCOUNTER — TELEPHONE (OUTPATIENT)
Dept: INTERNAL MEDICINE CLINIC | Facility: CLINIC | Age: 80
End: 2020-03-13

## 2020-03-13 DIAGNOSIS — R42 VERTIGO: Primary | ICD-10-CM

## 2020-03-13 RX ORDER — MECLIZINE HCL 25MG 25 MG/1
25 TABLET, CHEWABLE ORAL
COMMUNITY
Start: 2020-03-05 | End: 2020-03-13 | Stop reason: SDUPTHER

## 2020-03-13 RX ORDER — MECLIZINE HCL 25MG 25 MG/1
25 TABLET, CHEWABLE ORAL
Qty: 60 TAB | Refills: 0 | Status: SHIPPED | OUTPATIENT
Start: 2020-03-13 | End: 2020-04-02

## 2020-03-13 NOTE — TELEPHONE ENCOUNTER
[facility] home health nurse has requested a prescription for patient of meclinzine he was discharged from the hospital with the medication but still says he is having dizziness.   [phone]

## 2020-03-16 ENCOUNTER — TELEPHONE (OUTPATIENT)
Dept: INTERNAL MEDICINE CLINIC | Facility: CLINIC | Age: 80
End: 2020-03-16

## 2020-03-16 NOTE — TELEPHONE ENCOUNTER
Pharmacist had a question about the [facility] [place], operated by [facility].

## 2020-03-16 NOTE — TELEPHONE ENCOUNTER
Pharmacist called and needed to speak with the nurse to let them no about a medication.  They did not stated what that was and hung up before being able to get more information

## 2020-03-30 ENCOUNTER — OFFICE VISIT (OUTPATIENT)
Dept: INTERNAL MEDICINE CLINIC | Facility: CLINIC | Age: 80
End: 2020-03-30

## 2020-03-30 VITALS
TEMPERATURE: 98.2 F | SYSTOLIC BLOOD PRESSURE: 128 MMHG | HEIGHT: 68 IN | DIASTOLIC BLOOD PRESSURE: 82 MMHG | WEIGHT: 199 LBS | BODY MASS INDEX: 30.16 KG/M2 | RESPIRATION RATE: 12 BRPM | HEART RATE: 80 BPM | OXYGEN SATURATION: 96 %

## 2020-03-30 DIAGNOSIS — E66.9 OBESITY (BMI 30.0-34.9): ICD-10-CM

## 2020-03-30 DIAGNOSIS — S06.5XAA SUBDURAL HEMATOMA: ICD-10-CM

## 2020-03-30 DIAGNOSIS — Z13.31 SCREENING FOR DEPRESSION: ICD-10-CM

## 2020-03-30 DIAGNOSIS — R42 VERTIGO: ICD-10-CM

## 2020-03-30 DIAGNOSIS — I60.9 SUBARACHNOID HEMORRHAGE (HCC): Primary | ICD-10-CM

## 2020-03-30 NOTE — PROGRESS NOTES
Nikita Thakur 73  Identified pt with two pt identifiers(name and ). Chief Complaint   Patient presents with   [facility]        Reviewed record In preparation for visit and have obtained necessary documentation. Has info on advanced directive but has not filled them out. 1. Have you been to the ER, urgent care clinic or hospitalized since your last visit? [facility], [facility] 3/2/2020    2. Have you seen or consulted any other health care providers outside of the [address] [place] [address] since your last visit? Include any pap smears or colon screening. No    Vitals reviewed with provider.     Health Maintenance reviewed:     Health Maintenance Due   Topic    DTaP/Tdap/Td series (1 - Tdap)    Shingrix Vaccine Age 49> (1 of 2)    GLAUCOMA SCREENING Q2Y     Medicare Yearly Exam           Wt Readings from Last 3 Encounters:   20 199 lb (90.3 kg)   19 195 lb (88.5 kg)   19 186 lb 4.6 oz (84.5 kg)        Temp Readings from Last 3 Encounters:   20 98.2 °F (36.8 °C) (Oral)   19 98 °F (36.7 °C) (Oral)   19 98.3 °F (36.8 °C)        BP Readings from Last 3 Encounters:   20 143/80   19 113/65   19 134/75        Pulse Readings from Last 3 Encounters:   20 80   19 73   19 93        Vitals:    20 1344 20 1345   BP: 153/83 143/80   Pulse: 80    Resp: 12    Temp: 98.2 °F (36.8 °C)    TempSrc: Oral    SpO2: 96%    Weight: 199 lb (90.3 kg)    Height: 5' 8\" (1.727 m)    PainSc:   0 - No pain           Learning Assessment:   :       Learning Assessment 10/14/2014   PRIMARY LEARNER Patient   HIGHEST LEVEL OF EDUCATION - PRIMARY LEARNER  GRADUATED HIGH SCHOOL OR GED   PRIMARY LANGUAGE ENGLISH   LEARNER PREFERENCE PRIMARY LISTENING   ANSWERED BY self   RELATIONSHIP SELF        Depression Screening:   :       3 most recent PHQ Screens 3/30/2020   Little interest or pleasure in doing things Not at all   Feeling down, depressed, irritable, or hopeless Nearly every day   Total Score PHQ 2 3        Fall Risk Assessment:   :       Fall Risk Assessment, last 12 mths 4/11/2019   Able to walk? Yes   Fall in past 12 months? No   Fall with injury? -   Number of falls in past 12 months -   Fall Risk Score -        Abuse Screening:   :       Abuse Screening Questionnaire 4/11/2019 3/29/2018 3/10/2017 3/29/2016 3/12/2015   Do you ever feel afraid of your partner? N N N N N   Are you in a relationship with someone who physically or mentally threatens you? N N N N N   Is it safe for you to go home?  Y Y Y Y Y        ADL Screening:   :       ADL Assessment 3/30/2020   Feeding yourself No Help Needed   Getting from bed to chair No Help Needed   Getting dressed No Help Needed   Bathing or showering No Help Needed   Walk across the room (includes cane/walker) No Help Needed   Using the telphone No Help Needed   Taking your medications No Help Needed   Preparing meals No Help Needed   Managing money (expenses/bills) No Help Needed   Moderately strenuous housework (laundry) No Help Needed   Shopping for personal items (toiletries/medicines) No Help Needed   Shopping for groceries No Help Needed   Driving No Help Needed   Climbing a flight of stairs No Help Needed   Getting to places beyond walking distances No Help Needed

## 2020-03-30 NOTE — PROGRESS NOTES
HISTORY OF PRESENT ILLNESS  Karis Pantoja is a [de-identified] y.o. male. HPI  Mr. Conrad Block is a [de-identified]y.o. year old male, he is seen today for Transition of Care services following a hospital discharge for vertigo, fall, subarachnoid hemorrhage and subdural hematoma on Mar 5..    He has hypertension with CKD, hyperlipidemia, coronary artery disease with hx of MI and CABG, obesity and glucose intolerance. He presented to the ED on March 2  He felt very dizzy: spinning. He thinks he fell back and hit head on headboard. He could not walk. In the ED Head CT showed small subarachnoid hemorrhage as well as a subdural hematoma. Blood pressure was elevated at 159/90. Physical exam was normal. CBC was normal. CMP was normal, troponin 0.019. CXR showed chronic elevation of right hemidiaphragm and atelectasis or focal consolidation in the lingula. .     He had an MRI which showed no acute infarct, the subdural and subarachnoid hemorrhages and chronic small vessel ischemic change. Echocardiogram showed AV moderaetly sclerotic, mild AI, grade II diastolic dysfunction, LA mild to moderate dilation, mild MR, mild concentric LVH, EF 55-60%. Carotid dopplers showed 0-49% stenosis in right and left ICA, patent vertebral arteries. He was seen in consultation by neurosurgeon who said no surgical intervention indicated. Blood pressure improved. He was able to ambulate. He was discharged with [facility] services which has since been completed. He denies headache. His equilibrium is very bad. Has to take his time. He had home PT, but met all goals quickly. He has a cane and a walker, but usually uses cane. Denies nausea, vomiting. Semi compliant with low salt diet. Fried food and red meat only twice a week. He is feeling very stressed due to taking care of his wife who is \"an invalid,\" and his 80year old mother in law.       3 most recent PHQ Screens 3/30/2020   Little interest or pleasure in doing things Not at all   Feeling down, depressed, irritable, or hopeless Nearly every day   Total Score PHQ 2 3   Trouble falling or staying asleep, or sleeping too much Not at all   Feeling tired or having little energy Not at all   Poor appetite, weight loss, or overeating Not at all   Feeling bad about yourself - or that you are a failure or have let yourself or your family down Not at all   Trouble concentrating on things such as school, work, reading, or watching TV Several days   Moving or speaking so slowly that other people could have noticed; or the opposite being so fidgety that others notice Not at all   Thoughts of being better off dead, or hurting yourself in some way Not at all   PHQ 9 Score 4   How difficult have these problems made it for you to do your work, take care of your home and get along with others Somewhat difficult     Patient Active Problem List   Diagnosis Code    Hyperlipidemia, mixed E78.2    Impotence N52.9    Diverticulosis K57.90    Hemorrhoids K64.9    Vitamin D deficiency E55.9    Benign hypertension with chronic kidney disease, stage III ([facility] I12.9, N18.3    GERD (gastroesophageal reflux disease) K21.9    Intermittent angle-closure glaucoma H40.239    Coronary artery disease involving native coronary artery of native heart without angina pectoris I25.10    Nephrolithiasis N20.0    Left median nerve neuropathy G56.12    CKD (chronic kidney disease) stage 3, GFR 30-59 ml/min ([facility]) N18.3    Lumbar stenosis with neurogenic claudication M48.062    Unequal blood pressure in upper extremities R09.89    Glucose intolerance (impaired glucose tolerance) R73.02    Combined forms of age-related cataract of right eye H25.811    Combined forms of age-related cataract of left eye H25.812    Spinal stenosis M48.00    Subarachnoid hemorrhage ([facility] I60.9     Past Medical History:   Diagnosis Date    [facility]     Arthritis     CAD (coronary artery disease) 12/24/2013    Cataract     Diverticulosis 9/22/2009  Dyslipidemia 9/22/2009    Hemorrhoids 9/22/2009    HTN 9/22/2009    Hypercholesterolemia     Impotence 9/22/2009    MI (myocardial infarction) ([facility] 12/2013    CABG X 3     Nausea & vomiting     Neuropathy 9/22/2009    Open angle glaucoma with borderline intraocular pressure     Vitamin D deficiency 9/22/2009     Past Surgical History:   Procedure Laterality Date    CARDIAC SURG PROCEDURE UNLIST      CABG X 3    ENDOSCOPY, COLON, DIAGNOSTIC  389164    HX CATARACT REMOVAL Bilateral 09/13/2017    HX CORONARY ARTERY BYPASS GRAFT  12/2013    triple    HX HERNIA REPAIR Bilateral     HX LITHOTRIPSY  [id number]    HX ORTHOPAEDIC      back surgery     Social History     Socioeconomic History    Marital status:      Spouse name: Not on file    Number of children: Not on file    Years of education: Not on file    Highest education level: Not on file   Tobacco Use    Smoking status: Never Smoker    Smokeless tobacco: Never Used   Substance and Sexual Activity    Alcohol use: No    Drug use: No    Sexual activity: Yes     Partners: Female     Family History   Problem Relation Age of Onset    Hypertension Mother     Diabetes Mother     Cancer Father         LIVER    Alcohol abuse Brother     Diabetes Sister     Hypertension Sister     Arthritis-osteo Sister     Kidney Disease Sister         DIALYSIS    Alcohol abuse Brother     Suicide Brother     No Known Problems Brother     No Known Problems Brother     Dementia Brother     Hypertension Son     Hypertension Son     Anesth Problems Neg Hx      Allergies   Allergen Reactions    Percocet [Oxycodone-Acetaminophen] Other (comments)     \"feel weird\"     Current Outpatient Medications   Medication Sig Dispense Refill    meclizine (ANTIVERT) 25 mg chewable tablet Take 1 Tab by mouth three (3) times daily as needed for Dizziness for up to 20 days. 60 Tab 0    amLODIPine (NORVASC) 10 mg tablet Take 1 Tab by mouth daily.  Indications: high blood pressure 30 Tab 11    losartan-hydroCHLOROthiazide (HYZAAR) 100-12.5 mg per tablet Take 1 Tab by mouth daily. 30 Tab 11    DULoxetine (CYMBALTA) 30 mg capsule Take 1 Cap by mouth daily. 30 Cap 5    atorvastatin (LIPITOR) 80 mg tablet TAKE ONE TABLET ONCE DAILY 30 Tab 11    carvedilol (COREG) 12.5 mg tablet Take 1 Tab by mouth two (2) times daily (with meals). 60 Tab 11    acetaminophen (TYLENOL) 500 mg tablet Take 1,000 mg by mouth daily as needed for Pain.  aspirin 81 mg chewable tablet Take 1 Tab by mouth nightly. 90 Tab 3       Review of Systems   Respiratory: Negative for shortness of breath. Cardiovascular: Negative for chest pain, palpitations and leg swelling. Neurological: Positive for tingling (fingertips). Visit Vitals  /82 (BP 1 Location: Left arm, BP Patient Position: Sitting)   Pulse 80   Temp 98.2 °F (36.8 °C) (Oral)   Resp 12   Ht 5' 8\" (1.727 m)   Wt 199 lb (90.3 kg)   SpO2 96%   BMI 30.26 kg/m²     Physical Exam  Vitals signs and nursing note reviewed. Constitutional:       Appearance: Normal appearance. HENT:      Head: Normocephalic and atraumatic. Mouth/Throat:      Mouth: Mucous membranes are moist.   Eyes:      Conjunctiva/sclera: Conjunctivae normal.   Neck:      Musculoskeletal: Neck supple. Cardiovascular:      Rate and Rhythm: Normal rate and regular rhythm. Heart sounds: Normal heart sounds. Heart sounds not distant. No murmur. No gallop. Pulmonary:      Effort: Pulmonary effort is normal.      Breath sounds: Normal breath sounds and air entry. No wheezing, rhonchi or rales. Musculoskeletal:      Right shoulder: He exhibits decreased range of motion. Right lower leg: No edema. Left lower leg: No edema. Lymphadenopathy:      Cervical: No cervical adenopathy. Skin:     General: Skin is warm and dry. Findings: No rash. Neurological:      Mental Status: He is alert and oriented to person, place, and time. Cranial Nerves: Cranial nerves are intact. Motor: Motor function is intact. Coordination: Coordination normal. Finger-Nose-Finger Test normal. Rapid alternating movements normal.      Gait: Gait is intact. Gait normal.      Deep Tendon Reflexes:      Reflex Scores:       Bicep reflexes are 2+ on the right side and 2+ on the left side. Patellar reflexes are 2+ on the right side and 2+ on the left side. Psychiatric:         Mood and Affect: Mood normal.         Behavior: Behavior normal. Behavior is cooperative. ASSESSMENT and PLAN    ICD-10-CM ICD-9-CM    1. Subarachnoid hemorrhage (HCC) I60.9 430    2. Subdural hematoma (HCC) S06.5X9A 432.1    3. Vertigo R42 780.4    4. Obesity (BMI 30.0-34. 9) E66.9 278.00    5. Screening for depression Z13.31 V79.0 [place] DEPRESSION SCREEN ANNUAL     Diagnoses and all orders for this visit:    1. Subarachnoid hemorrhage (HCC)  No symptoms. Recovered    2. Subdural hematoma (HCC)  No symptoms. Recovered    3. Vertigo  Episodes are brief lasting seconds to a minute. Conssitent with BPV. Urged to use walker rather than cane for better protection against falling again. 4. Obesity (BMI 30.0-34. 9)  Discussed using smaller plate for portion control, keeping a food diary for awareness of food consumed, checking weight often, and increasing physical activity. Will re-evaluate next visit. 5. Screening for depression-Negative. -     [place] DEPRESSION SCREEN ANNUAL      Follow-up and Dispositions    · Return for Keep appointment in May. .     I have discussed the diagnosis, evaluation and treatment options and the intended plan with the patient. Patient understands and is in agreement. The patient has received an after-visit summary and questions were answered concerning future plans. I have discussed side effects and warnings of any new medications with the patient as well.

## 2020-05-01 DIAGNOSIS — I12.9 BENIGN HYPERTENSION WITH CHRONIC KIDNEY DISEASE, STAGE III (HCC): ICD-10-CM

## 2020-05-01 DIAGNOSIS — M48.062 LUMBAR STENOSIS WITH NEUROGENIC CLAUDICATION: ICD-10-CM

## 2020-05-01 DIAGNOSIS — N18.30 BENIGN HYPERTENSION WITH CHRONIC KIDNEY DISEASE, STAGE III (HCC): ICD-10-CM

## 2020-05-01 DIAGNOSIS — E78.2 HYPERLIPIDEMIA, MIXED: ICD-10-CM

## 2020-05-01 RX ORDER — ATORVASTATIN CALCIUM 80 MG/1
TABLET, FILM COATED ORAL
Qty: 30 TAB | Refills: 11 | Status: SHIPPED | OUTPATIENT
Start: 2020-05-01 | End: 2021-01-29 | Stop reason: SDUPTHER

## 2020-05-01 RX ORDER — AMLODIPINE BESYLATE 10 MG/1
TABLET ORAL
Qty: 30 TAB | Refills: 11 | Status: SHIPPED | OUTPATIENT
Start: 2020-05-01 | End: 2021-01-29 | Stop reason: SDUPTHER

## 2020-05-01 RX ORDER — DULOXETIN HYDROCHLORIDE 30 MG/1
CAPSULE, DELAYED RELEASE ORAL
Qty: 30 CAP | Refills: 11 | Status: SHIPPED | OUTPATIENT
Start: 2020-05-01 | End: 2021-01-29 | Stop reason: SDUPTHER

## 2020-05-27 PROBLEM — I60.9 SUBARACHNOID HEMORRHAGE (HCC): Status: RESOLVED | Noted: 2020-03-02 | Resolved: 2020-05-27

## 2020-05-28 ENCOUNTER — VIRTUAL VISIT (OUTPATIENT)
Dept: INTERNAL MEDICINE CLINIC | Facility: CLINIC | Age: 80
End: 2020-05-28

## 2020-05-28 DIAGNOSIS — K21.9 GASTROESOPHAGEAL REFLUX DISEASE WITHOUT ESOPHAGITIS: ICD-10-CM

## 2020-05-28 DIAGNOSIS — I25.10 CORONARY ARTERY DISEASE INVOLVING NATIVE CORONARY ARTERY OF NATIVE HEART WITHOUT ANGINA PECTORIS: ICD-10-CM

## 2020-05-28 DIAGNOSIS — N18.30 BENIGN HYPERTENSION WITH CHRONIC KIDNEY DISEASE, STAGE III (HCC): ICD-10-CM

## 2020-05-28 DIAGNOSIS — R73.02 GLUCOSE INTOLERANCE (IMPAIRED GLUCOSE TOLERANCE): ICD-10-CM

## 2020-05-28 DIAGNOSIS — Z00.00 MEDICARE ANNUAL WELLNESS VISIT, SUBSEQUENT: Primary | ICD-10-CM

## 2020-05-28 DIAGNOSIS — E78.2 HYPERLIPIDEMIA, MIXED: ICD-10-CM

## 2020-05-28 DIAGNOSIS — I12.9 BENIGN HYPERTENSION WITH CHRONIC KIDNEY DISEASE, STAGE III (HCC): ICD-10-CM

## 2020-05-28 DIAGNOSIS — Z71.89 ADVANCE DIRECTIVE DISCUSSED WITH PATIENT: ICD-10-CM

## 2020-05-28 NOTE — ACP (ADVANCE CARE PLANNING)
Advance Care Planning       Advance Care Planning (ACP) Physician/NP/PA (Provider) Conversation        Date of ACP Conversation: 5/28/2020    Conversation Conducted with:   Patient with Decision Making Linn Mario Maker:    Current Designated Health Care Decision Maker:   (If there is a valid [place] named in the [address]" box in the ACP activity, but it is not visible above, be sure to open that field and then select the health care decision maker relationship (ie \"primary\") in the blank space to the right of the name.)    Note: Assess and validate information in current ACP documents, as indicated. If no Authorized Decision Maker has previously been identified, then patient chooses [place]:  \"Who would you like to name as your primary health care decision-maker? \"    Name: Nathaniel Breaux   Relationship: son Phone number: [phone]  Phuong Boys this person be reached easily? \" YES  \"Who would you like to name as your back-up decision maker? \"   Name: Mary Mcbride  Relationship: daughter-in-law Phone number: [phone]  Phuong Boys this person be reached easily? \" YES    Note: If the relationship of these Decision-Makers to the patient does NOT follow your state's Next of Kin hierarchy, recommend that patient complete ACP document that meets state-specific requirements to allow them to act on the patient's behalf when appropriate. Care Preferences:    Hospitalization: \"If your health worsens and it becomes clear that your chance of recovery is unlikely, what would your preference be regarding hospitalization? \"  Hospital      Ventilation: \"If you were in your present state of health and suddenly became very ill and were unable to breathe on your own, what would your preference be about the use of a ventilator (breathing machine) if it was available to you? \"    Yes    \"If your health worsens and it becomes clear that your chance of recovery is unlikely, what would your preference be about the use of a ventilator (breathing machine) if it was available to you? \"   No      Resuscitation:  \"CPR works best to restart the heart when there is a sudden event, like a heart attack, in someone who is otherwise healthy. Unfortunately, CPR does not typically restart the heart for people who have serious health conditions or who are very sick. \"    \"In the event your heart stopped as a result of an underlying serious health condition, would you want attempts to be made to restart your heart (answer \"yes\" for attempt to resuscitate) or would you prefer a natural death (answer \"no\" for do not attempt to resuscitate)? \"   No CPR    NOTE: If the patient has a valid advance directive AND provides care preference(s) that are inconsistent with that prior directive, advise the patient to consider either: creating a new advance directive that complies with state-specific requirements; or, if that is not possible, orally revoking that prior directive in accordance with state-specific requirements, which must be documented in the EHR.     Conversation Outcomes / Follow-Up Plan:   Recommended completion of Advance Directive      Length of Voluntary ACP Conversation in minutes:  <16 minutes (Non-Billable)      Judit Herrera MD

## 2020-05-28 NOTE — PROGRESS NOTES
Nikita Thakur 73  Identified pt with two pt identifiers(name and ). Chief Complaint   Patient presents with    Hypertension    Cholesterol Problem    Blood sugar problem    Coronary Artery Disease    Neck Pain       Reviewed record In preparation for visit and have obtained necessary documentation. Has info on advanced directive but has not filled them out. 1. Have you been to the ER, urgent care clinic or hospitalized since your last visit? No     2. Have you seen or consulted any other health care providers outside of the [address] [place] [address] since your last visit? Include any pap smears or colon screening. No    Vitals reviewed with provider. Health Maintenance reviewed:     Health Maintenance Due   Topic    DTaP/Tdap/Td series (1 - Tdap)    Shingrix Vaccine Age 49> (1 of 2)    GLAUCOMA SCREENING Q2Y     Medicare Yearly Exam     Lipid Screen           Wt Readings from Last 3 Encounters:   20 199 lb (90.3 kg)   19 195 lb (88.5 kg)   19 186 lb 4.6 oz (84.5 kg)        Temp Readings from Last 3 Encounters:   20 98.2 °F (36.8 °C) (Oral)   19 98 °F (36.7 °C) (Oral)   19 98.3 °F (36.8 °C)        BP Readings from Last 3 Encounters:   20 128/82   19 113/65   19 134/75        Pulse Readings from Last 3 Encounters:   20 80   19 73   19 93      There were no vitals filed for this visit.        Learning Assessment:   :       Learning Assessment 10/14/2014   PRIMARY LEARNER Patient   HIGHEST LEVEL OF EDUCATION - PRIMARY LEARNER  GRADUATED HIGH SCHOOL OR GED   PRIMARY LANGUAGE ENGLISH   LEARNER PREFERENCE PRIMARY LISTENING   ANSWERED BY self   RELATIONSHIP SELF        Depression Screening:   :       3 most recent PHQ Screens 3/30/2020   Little interest or pleasure in doing things Not at all   Feeling down, depressed, irritable, or hopeless Nearly every day   Total Score PHQ 2 3   Trouble falling or staying asleep, or sleeping too much Not at all   Feeling tired or having little energy Not at all   Poor appetite, weight loss, or overeating Not at all   Feeling bad about yourself - or that you are a failure or have let yourself or your family down Not at all   Trouble concentrating on things such as school, work, reading, or watching TV Several days   Moving or speaking so slowly that other people could have noticed; or the opposite being so fidgety that others notice Not at all   Thoughts of being better off dead, or hurting yourself in some way Not at all   PHQ 9 Score 4   How difficult have these problems made it for you to do your work, take care of your home and get along with others Somewhat difficult        Fall Risk Assessment:   :       Fall Risk Assessment, last 12 mths 5/28/2020   Able to walk? Yes   Fall in past 12 months? No   Fall with injury? -   Number of falls in past 12 months -   Fall Risk Score -        Abuse Screening:   :       Abuse Screening Questionnaire 5/28/2020 4/11/2019 3/29/2018 3/10/2017 3/29/2016 3/12/2015   Do you ever feel afraid of your partner? N N N N N N   Are you in a relationship with someone who physically or mentally threatens you? N N N N N N   Is it safe for you to go home?  Y Y Y Y Y Y        ADL Screening:   :       ADL Assessment 3/30/2020   Feeding yourself No Help Needed   Getting from bed to chair No Help Needed   Getting dressed No Help Needed   Bathing or showering No Help Needed   Walk across the room (includes cane/walker) No Help Needed   Using the telphone No Help Needed   Taking your medications No Help Needed   Preparing meals No Help Needed   Managing money (expenses/bills) No Help Needed   Moderately strenuous housework (laundry) No Help Needed   Shopping for personal items (toiletries/medicines) No Help Needed   Shopping for groceries No Help Needed   Driving No Help Needed   Climbing a flight of stairs No Help Needed   Getting to places beyond walking distances No Help Needed

## 2020-05-28 NOTE — PROGRESS NOTES
This is the Subsequent Medicare Annual Wellness Exam, performed 12 months or more after the Initial AWV or the last Subsequent AWV    I have reviewed the patient's medical history in detail and updated the computerized patient record.      History     Patient Active Problem List   Diagnosis Code    Hyperlipidemia, mixed E78.2    Impotence N52.9    Diverticulosis K57.90    Hemorrhoids K64.9    Vitamin D deficiency E55.9    Benign hypertension with chronic kidney disease, stage III ([address] I12.9, N18.3    GERD (gastroesophageal reflux disease) K21.9    Intermittent angle-closure glaucoma H40.239    Coronary artery disease involving native coronary artery of native heart without angina pectoris I25.10    Nephrolithiasis N20.0    Left median nerve neuropathy G56.12    CKD (chronic kidney disease) stage 3, GFR 30-59 ml/min ([facility]) N18.3    Lumbar stenosis with neurogenic claudication M48.062    Unequal blood pressure in upper extremities R09.89    Glucose intolerance (impaired glucose tolerance) R73.02    Combined forms of age-related cataract of right eye H25.811    Combined forms of age-related cataract of left eye H25.812    Spinal stenosis M48.00     Past Medical History:   Diagnosis Date    A-fib [facility]     Arthritis     CAD (coronary artery disease) 12/24/2013    Cataract     Diverticulosis 9/22/2009    Dyslipidemia 9/22/2009    Hemorrhoids 9/22/2009    HTN 9/22/2009    Hypercholesterolemia     Impotence 9/22/2009    MI (myocardial infarction) ([address] 12/2013    CABG X 3     Nausea & vomiting     Neuropathy 9/22/2009    Open angle glaucoma with borderline intraocular pressure     Subarachnoid hemorrhage ([address] 3/2/2020    Vitamin D deficiency 9/22/2009      Past Surgical History:   Procedure Laterality Date    CARDIAC SURG PROCEDURE UNLIST      CABG X 3    ENDOSCOPY, COLON, DIAGNOSTIC  861076    HX CATARACT REMOVAL Bilateral 09/13/2017    HX CORONARY ARTERY BYPASS GRAFT  12/2013 triple    HX HERNIA REPAIR Bilateral     HX LITHOTRIPSY  [id number]    HX ORTHOPAEDIC      back surgery     Current Outpatient Medications   Medication Sig Dispense Refill    atorvastatin (LIPITOR) 80 mg tablet TAKE ONE TABLET ONCE DAILY 30 Tab 11    amLODIPine (NORVASC) 10 mg tablet TAKE ONE TABLET ONCE DAILY  (HIGH BLOOD PRESSURE) 30 Tab 11    DULoxetine (CYMBALTA) 30 mg capsule TAKE ONE CAPSULE ONCE DAILY 30 Cap 11    losartan-hydroCHLOROthiazide (HYZAAR) 100-12.5 mg per tablet Take 1 Tab by mouth daily. 30 Tab 11    carvedilol (COREG) 12.5 mg tablet Take 1 Tab by mouth two (2) times daily (with meals). 60 Tab 11    acetaminophen (TYLENOL) 500 mg tablet Take 1,000 mg by mouth daily as needed for Pain.  aspirin 81 mg chewable tablet Take 1 Tab by mouth nightly.  90 Tab 3     Allergies   Allergen Reactions    Percocet [Oxycodone-Acetaminophen] Other (comments)     \"feel weird\"       Family History   Problem Relation Age of Onset    Hypertension Mother     Diabetes Mother     Cancer Father         LIVER    Alcohol abuse Brother     Diabetes Sister     Hypertension Sister     Arthritis-osteo Sister     Kidney Disease Sister         DIALYSIS    Alcohol abuse Brother     Suicide Brother     No Known Problems Brother     No Known Problems Brother     Dementia Brother     Hypertension Son     Hypertension Son     Anesth Problems Neg Hx      Social History     Tobacco Use    Smoking status: Never Smoker    Smokeless tobacco: Never Used   Substance Use Topics    Alcohol use: No       Depression Risk Factor Screening:     3 most recent PHQ Screens 3/30/2020   Little interest or pleasure in doing things Not at all   Feeling down, depressed, irritable, or hopeless Nearly every day   Total Score PHQ 2 3   Trouble falling or staying asleep, or sleeping too much Not at all   Feeling tired or having little energy Not at all   Poor appetite, weight loss, or overeating Not at all   Feeling bad about yourself - or that you are a failure or have let yourself or your family down Not at all   Trouble concentrating on things such as school, work, reading, or watching TV Several days   Moving or speaking so slowly that other people could have noticed; or the opposite being so fidgety that others notice Not at all   Thoughts of being better off dead, or hurting yourself in some way Not at all   PHQ 9 Score 4   How difficult have these problems made it for you to do your work, take care of your home and get along with others Somewhat difficult       Alcohol Risk Factor Screening (MALE > 65): Do you average more 1 drink per night or more than 7 drinks a week: No    In the past three months have you have had more than 4 drinks containing alcohol on one occasion: No      Functional Ability and Level of Safety:   Hearing: Hearing is good. Activities of Daily Living: The home contains: handrails, grab bars and rugs  Patient does total self care    Ambulation: with mild difficulty due to back problems and vertigo    Fall Risk:  Fall Risk Assessment, last 12 mths 5/28/2020   Able to walk? Yes   Fall in past 12 months?  No   Fall with injury? -   Number of falls in past 12 months -   Fall Risk Score -       Abuse Screen:  Patient is not abused    Cognitive Screening   Has your family/caregiver stated any concerns about your memory: no  Cognitive Screening: Normal - Animal Naming Test    Patient Care Team   Patient Care Team:  Dara Zambrano MD as PCP - General (Internal Medicine)  Dara Zambrano MD as PCP - REHABILITATION [facility] Empaneled Provider  Patti Ferguson DO as Consulting Provider (Ophthalmology)  Santi Piedra MD as Consulting Provider (Orthopedic Surgery)    Assessment/Plan   Education and counseling provided:  End-of-Life planning (with patient's consent)        Health Maintenance Due   Topic Date Due    DTaP/Tdap/Td series (1 - Tdap) 09/06/1960    Shingrix Vaccine Age 50> (1 of 2) 09/06/1989    GLAUCOMA SCREENING Q2Y  07/25/2019    Medicare Yearly Exam  04/11/2020    Lipid Screen  05/21/2020

## 2021-01-07 ENCOUNTER — TELEPHONE (OUTPATIENT)
Dept: INTERNAL MEDICINE CLINIC | Age: 81
End: 2021-01-07

## 2021-01-07 NOTE — TELEPHONE ENCOUNTER
Rodolfo PT with [facility] [phone] called and needs verbal orders for pt to do PT 2x week for 4 weeks for rehabilitation

## 2021-01-07 NOTE — TELEPHONE ENCOUNTER
Spoke with Cruz. Verified patients name and date of birth. Patient was at [facility] after after a CVA. He was released yesterday and has an appt on 1/29/2021. Cam you follow him for [place] orders?

## 2021-01-29 ENCOUNTER — VIRTUAL VISIT (OUTPATIENT)
Dept: INTERNAL MEDICINE CLINIC | Age: 81
End: 2021-01-29
Payer: MEDICARE

## 2021-01-29 DIAGNOSIS — N20.0 NEPHROLITHIASIS: ICD-10-CM

## 2021-01-29 DIAGNOSIS — M48.062 LUMBAR STENOSIS WITH NEUROGENIC CLAUDICATION: ICD-10-CM

## 2021-01-29 DIAGNOSIS — I12.9 BENIGN HYPERTENSION WITH CHRONIC KIDNEY DISEASE, STAGE III (HCC): ICD-10-CM

## 2021-01-29 DIAGNOSIS — N18.30 BENIGN HYPERTENSION WITH CHRONIC KIDNEY DISEASE, STAGE III (HCC): ICD-10-CM

## 2021-01-29 DIAGNOSIS — U07.1 COVID-19: ICD-10-CM

## 2021-01-29 DIAGNOSIS — Z86.73 HISTORY OF CVA (CEREBROVASCULAR ACCIDENT): Primary | ICD-10-CM

## 2021-01-29 DIAGNOSIS — E78.2 HYPERLIPIDEMIA, MIXED: ICD-10-CM

## 2021-01-29 PROCEDURE — 99442 PR PHYS/QHP TELEPHONE EVALUATION 11-20 MIN: CPT | Performed by: INTERNAL MEDICINE

## 2021-01-29 RX ORDER — DULOXETIN HYDROCHLORIDE 60 MG/1
CAPSULE, DELAYED RELEASE ORAL
Qty: 30 CAP | Refills: 11 | Status: SHIPPED | OUTPATIENT
Start: 2021-01-29 | End: 2021-05-07

## 2021-01-29 RX ORDER — LOSARTAN POTASSIUM AND HYDROCHLOROTHIAZIDE 12.5; 1 MG/1; MG/1
TABLET ORAL
Qty: 30 TAB | Refills: 11 | Status: CANCELLED | OUTPATIENT
Start: 2021-01-29

## 2021-01-29 RX ORDER — CARVEDILOL 25 MG/1
25 TABLET ORAL 2 TIMES DAILY WITH MEALS
Qty: 60 TAB | Refills: 11 | Status: SHIPPED | OUTPATIENT
Start: 2021-01-29 | End: 2022-03-04

## 2021-01-29 RX ORDER — ATORVASTATIN CALCIUM 80 MG/1
TABLET, FILM COATED ORAL
Qty: 30 TAB | Refills: 11 | Status: SHIPPED | OUTPATIENT
Start: 2021-01-29 | End: 2022-07-12 | Stop reason: SDUPTHER

## 2021-01-29 RX ORDER — AMLODIPINE BESYLATE 10 MG/1
TABLET ORAL
Qty: 30 TAB | Refills: 11 | Status: SHIPPED | OUTPATIENT
Start: 2021-01-29 | End: 2022-07-12 | Stop reason: SDUPTHER

## 2021-01-29 RX ORDER — MECLIZINE HYDROCHLORIDE 25 MG/1
TABLET ORAL
Qty: 30 TAB | Refills: 1 | Status: SHIPPED | OUTPATIENT
Start: 2021-01-29 | End: 2021-05-07

## 2021-01-29 NOTE — PROGRESS NOTES
James Mcbride is a 79 y.o. male, evaluated via audio-only technology on 1/29/2021 for Medication Refill (Pt states that he does not have any of his medications since he got out of the ER the beginning of the month. )    Assessment & Plan:     Diagnoses and all orders for this visit:    1. History of CVA (cerebrovascular accident)  No symptoms of recurrence, s/p SNF stay - not on anticoagulation due to subarachnoid hemorrhage in 3/2020  2. Benign hypertension with chronic kidney disease, stage III  -     amLODIPine (NORVASC) 10 mg tablet; TAKE ONE TABLET ONCE DAILY  (HIGH BLOOD PRESSURE)  -     carvediloL (COREG) 25 mg tablet; Take 1 Tab by mouth two (2) times daily (with meals). Unclear control - needs refills  3. Hyperlipidemia, mixed  -     atorvastatin (LIPITOR) 80 mg tablet; TAKE ONE TABLET ONCE DAILY    4. Lumbar stenosis with neurogenic claudication  -     DULoxetine (CYMBALTA) 60 mg capsule; TAKE ONE CAPSULE ONCE DAILY  Stable - continue current medications   5. Nephrolithiasis  No symptoms  6. COVID-19  resolved  Other orders  -     meclizine (ANTIVERT) 25 mg tablet; TAKE ONE TABLET 3 TIMES DAILY AS NEEDED  FOR DIZZINESS      Follow-up and Dispositions    · Return in about 1 month (around 2/28/2021) for bp. Routing History          12  Subjective:     Admitted 10/21-10/24 to [facility] for acute CVA right aury with left upper and lower extremity weakness. Better since going to rehab. Says since he has been home got readmitted for kidney stone, and hasn't started rehab yet. Wants to get restarted. Records reveal was admitted with severe sepsis, covid-19 and right distal ureteral stone. Treated with abx IV, blood cx neg and was sent home with ciprofloxacin. No chest pain or sob. No n/v/abd. No f/c or sweats. No new weakness. Says had covid-19 during 2nd admission. Hasn't been checking bp. Trying to get meter from home health. No edema other than in left foot - present prior to stroke - mild. Prior to Admission medications    Medication Sig Start Date End Date Taking? Authorizing Provider   meclizine (ANTIVERT) 25 mg tablet TAKE ONE TABLET 3 TIMES DAILY AS NEEDED  FOR DIZZINESS 1/29/21  Yes Edward Velez MD   atorvastatin (LIPITOR) 80 mg tablet TAKE ONE TABLET ONCE DAILY 1/29/21  Yes Edward Velez MD   amLODIPine (NORVASC) 10 mg tablet TAKE ONE TABLET ONCE DAILY  (HIGH BLOOD PRESSURE) 1/29/21  Yes Edward Velez MD   DULoxetine (CYMBALTA) 60 mg capsule TAKE ONE CAPSULE ONCE DAILY 1/29/21  Yes Edward Velez MD   carvediloL (COREG) 25 mg tablet Take 1 Tab by mouth two (2) times daily (with meals). 1/29/21  Yes Edward Velez MD   acetaminophen (TYLENOL) 500 mg tablet Take 1,000 mg by mouth daily as needed for Pain. Yes Provider, Historical   aspirin 81 mg chewable tablet Take 1 Tab by mouth nightly. 3/10/17   Leticia You MD         ROS  Per HPI  No flowsheet data found. Nikita Pavon, who was evaluated through a patient-initiated, synchronous (real-time) audio only encounter, and/or her healthcare decision maker, is aware that it is a billable service, with coverage as determined by his insurance carrier. He provided verbal consent to proceed: Yes. He has not had a related appointment within my department in the past 7 days or scheduled within the next 24 hours.       Total Time: minutes: 11-20 minutes    Lizz Pathak MD

## 2021-01-29 NOTE — PROGRESS NOTES
Nikita Thakur 73  Identified pt with two pt identifiers(name and ). Chief Complaint   Patient presents with    Medication Refill     Pt states that he does not have any of his medications since he got out of the ER the beginning of the month. Reviewed record In preparation for visit and have obtained necessary documentation. 1. Have you been to the ER, urgent care clinic or hospitalized since your last visit? No     2. Have you seen or consulted any other health care providers outside of the [address] [place] [address] [place] since your last visit? Include any pap smears or colon screening. No    Patient does not have an advance directive. Vitals reviewed with provider. Health Maintenance reviewed:     Health Maintenance Due   Topic    COVID-19 Vaccine (1 of 2)    DTaP/Tdap/Td series (1 - Tdap)    Shingrix Vaccine Age 50> (1 of 2)    GLAUCOMA SCREENING Q2Y     Flu Vaccine (1)   omeo  Wt Readings from Last 3 Encounters:   20 199 lb (90.3 kg)   19 195 lb (88.5 kg)   19 186 lb 4.6 oz (84.5 kg)   ne who physically or mentally threatens you? N N N N N N   Is it safe for you to go home? Y Y Y Y Y Y           Temp Readings from Last 3 Encounters:   20 98.2 °F (36.8 °C) (Oral)   19 98 °F (36.7 °C) (Oral)   19 98.3 °F (36.8 °C)    No Help Needed    No Help Needed    No Help Needed                    BP Readings from Last 3 Encounters:   20 128/82   19 113/65   19 134/75          SThere were no vitals filed for this visit. \[facility]\""  Learning Assessment 10/14/2014   PRIMARY LEARNER Patient   HIGHEST LEVEL OF EDUCATION - PRIMARY LEARNER  GRADUATED HIGH SCHOOL OR GED   PRIMARY LANGUAGE ENGLISH   LEARNER PREFERENCE PRIMARY LISTENING   ANSWERED BY self   RELATIONSHIP SELF   ng for personal items (toiletries/medicines)    Shopping for groceries    Driving    Climbing a flight of stairs    Getting to places beyond walking distances       Abuse Screening [facility]  3 most recent PHQ Screens 1/29/2021   Little interest or pleasure in doing things Not at all   Feeling down, depressed, irritable, or hopeless Not at all   Total Score PHQ 2 0   Trouble falling or staying asleep, or sleeping too much -   Feeling tired or having little energy -   Poor appetite, weight loss, or overeating -   Feeling bad about yourself - or that you are a failure or have let yourself or your family down -   Trouble concentrating on things such as school, work, reading, or watching TV -   Moving or speaking so slowly that other people could have noticed; or the opposite being so fidgety that others notice -   Thoughts of being better off dead, or hurting yourself in some way -   PHQ 9 Score -   How difficult have these problems made it for you to do your work, take care of your home and get along with others -

## 2021-02-05 ENCOUNTER — TELEPHONE (OUTPATIENT)
Dept: INTERNAL MEDICINE CLINIC | Age: 81
End: 2021-02-05

## 2021-02-05 NOTE — TELEPHONE ENCOUNTER
Kenneth with [facility] called and stated that she will be resending orders over for Dr. Fraire to resign because they have have her name on them

## 2021-02-09 ENCOUNTER — TELEPHONE (OUTPATIENT)
Dept: INTERNAL MEDICINE CLINIC | Age: 81
End: 2021-02-09

## 2021-02-09 NOTE — TELEPHONE ENCOUNTER
Silvia Coulter from [facility] called and requested a callback at [phone] to discuss delay in nursing evaluation for pt. Pt had appt today, but pt was not home @ scheduled appt time.

## 2021-02-11 ENCOUNTER — TELEPHONE (OUTPATIENT)
Dept: INTERNAL MEDICINE CLINIC | Age: 81
End: 2021-02-11

## 2021-02-11 NOTE — TELEPHONE ENCOUNTER
[facility] called and is seeking verbal orders to continue w/ HH & PT (1/wk for 1 week, 2/wk for 2 weeks, 1/wk for 1 wk). Call orders into [phone].

## 2021-02-12 NOTE — TELEPHONE ENCOUNTER
I called Mitch Hoffman and verified the patient by name and date of birth. She informed me that she went to see the patient earlier this week and waited for about 30 minutes. The patient was not home. She saw the patient today.

## 2021-05-07 ENCOUNTER — OFFICE VISIT (OUTPATIENT)
Dept: INTERNAL MEDICINE CLINIC | Age: 81
End: 2021-05-07
Payer: MEDICARE

## 2021-05-07 VITALS
DIASTOLIC BLOOD PRESSURE: 92 MMHG | TEMPERATURE: 98.9 F | OXYGEN SATURATION: 95 % | WEIGHT: 209 LBS | BODY MASS INDEX: 31.67 KG/M2 | HEART RATE: 92 BPM | HEIGHT: 68 IN | SYSTOLIC BLOOD PRESSURE: 153 MMHG

## 2021-05-07 DIAGNOSIS — R73.02 GLUCOSE INTOLERANCE (IMPAIRED GLUCOSE TOLERANCE): ICD-10-CM

## 2021-05-07 DIAGNOSIS — I25.10 CORONARY ARTERY DISEASE INVOLVING NATIVE CORONARY ARTERY OF NATIVE HEART WITHOUT ANGINA PECTORIS: ICD-10-CM

## 2021-05-07 DIAGNOSIS — I69.354 HEMIPARESIS AFFECTING LEFT SIDE AS LATE EFFECT OF CEREBROVASCULAR ACCIDENT (CVA) (HCC): ICD-10-CM

## 2021-05-07 DIAGNOSIS — E55.9 VITAMIN D DEFICIENCY: ICD-10-CM

## 2021-05-07 DIAGNOSIS — I10 ESSENTIAL HYPERTENSION: ICD-10-CM

## 2021-05-07 DIAGNOSIS — Z95.1 HISTORY OF CORONARY ARTERY BYPASS GRAFT: ICD-10-CM

## 2021-05-07 DIAGNOSIS — I12.9 BENIGN HYPERTENSION WITH CHRONIC KIDNEY DISEASE, STAGE III (HCC): ICD-10-CM

## 2021-05-07 DIAGNOSIS — I69.359 CVA, OLD, HEMIPARESIS (HCC): Primary | ICD-10-CM

## 2021-05-07 DIAGNOSIS — R60.9 EDEMA, UNSPECIFIED TYPE: ICD-10-CM

## 2021-05-07 DIAGNOSIS — E78.2 HYPERLIPIDEMIA, MIXED: ICD-10-CM

## 2021-05-07 DIAGNOSIS — N18.30 BENIGN HYPERTENSION WITH CHRONIC KIDNEY DISEASE, STAGE III (HCC): ICD-10-CM

## 2021-05-07 PROCEDURE — 1101F PT FALLS ASSESS-DOCD LE1/YR: CPT | Performed by: PHYSICIAN ASSISTANT

## 2021-05-07 PROCEDURE — 99214 OFFICE O/P EST MOD 30 MIN: CPT | Performed by: PHYSICIAN ASSISTANT

## 2021-05-07 PROCEDURE — G8417 CALC BMI ABV UP PARAM F/U: HCPCS | Performed by: PHYSICIAN ASSISTANT

## 2021-05-07 PROCEDURE — G8427 DOCREV CUR MEDS BY ELIG CLIN: HCPCS | Performed by: PHYSICIAN ASSISTANT

## 2021-05-07 PROCEDURE — G8753 SYS BP > OR = 140: HCPCS | Performed by: PHYSICIAN ASSISTANT

## 2021-05-07 PROCEDURE — G8755 DIAS BP > OR = 90: HCPCS | Performed by: PHYSICIAN ASSISTANT

## 2021-05-07 PROCEDURE — G8536 NO DOC ELDER MAL SCRN: HCPCS | Performed by: PHYSICIAN ASSISTANT

## 2021-05-07 PROCEDURE — G8432 DEP SCR NOT DOC, RNG: HCPCS | Performed by: PHYSICIAN ASSISTANT

## 2021-05-07 RX ORDER — LANOLIN ALCOHOL/MO/W.PET/CERES
1000 CREAM (GRAM) TOPICAL DAILY
COMMUNITY
End: 2022-07-12

## 2021-05-07 RX ORDER — DONEPEZIL HYDROCHLORIDE 5 MG/1
TABLET, FILM COATED ORAL
COMMUNITY
End: 2022-07-12 | Stop reason: SDUPTHER

## 2021-05-07 RX ORDER — HYDROCHLOROTHIAZIDE 12.5 MG/1
TABLET ORAL
Qty: 90 TAB | Refills: 3 | Status: SHIPPED | OUTPATIENT
Start: 2021-05-07 | End: 2022-07-12 | Stop reason: SDUPTHER

## 2021-05-07 RX ORDER — HYDROCHLOROTHIAZIDE 12.5 MG/1
TABLET ORAL
COMMUNITY
Start: 2021-02-28 | End: 2021-05-07 | Stop reason: SDUPTHER

## 2021-05-07 NOTE — PROGRESS NOTES
Pt is here today c/o of worsening swelling in both feet. Identified pt with two pt identifiers(name and ). Reviewed record in preparation for visit and have obtained necessary documentation. Chief Complaint   Patient presents with    Foot Swelling     both feet        Vitals:    21 1335   BP: (!) 153/92   Pulse: 92   Temp: 98.9 °F (37.2 °C)   TempSrc: Oral   SpO2: 95%   Weight: 209 lb (94.8 kg)   Height: 5' 8\" (1.727 m)   PainSc:   0 - No pain       Health Maintenance Due   Topic    DTaP/Tdap/Td series (1 - Tdap)    Shingrix Vaccine Age 49> (1 of 2)    Medicare Yearly Exam        Coordination of Care Questionnaire:  :   1) Have you been to an emergency room, urgent care, or hospitalized since your last visit? If yes, where when, and reason for visit? No       2. Have seen or consulted any other health care provider since your last visit?    If yes, where when, and reason for visit?  no

## 2021-05-07 NOTE — PATIENT INSTRUCTIONS
Begin taking your medications daily. Hydrochlorothiazide is daily in AM 
Carvedilol is twice daily with breakfast and dinner Amlodipine is daily in AM 
Atorvastatin is at night before bed. Monitor your blood pressures at home. Follow up in 1 month to recheck blood pressures and go over lab work. Please get labs drawn with 10 hour fasting (no food after midnight) and we will go over them at your next appt.

## 2021-06-19 LAB
25(OH)D3+25(OH)D2 SERPL-MCNC: 14.3 NG/ML (ref 30–100)
ALBUMIN SERPL-MCNC: 4.1 G/DL (ref 3.6–4.6)
ALBUMIN/GLOB SERPL: 1.5 {RATIO} (ref 1.2–2.2)
ALP SERPL-CCNC: 85 IU/L (ref 48–121)
ALT SERPL-CCNC: 24 IU/L (ref 0–44)
AST SERPL-CCNC: 29 IU/L (ref 0–40)
BASOPHILS # BLD AUTO: 0 X10E3/UL (ref 0–0.2)
BASOPHILS NFR BLD AUTO: 1 %
BILIRUB SERPL-MCNC: 0.5 MG/DL (ref 0–1.2)
BUN SERPL-MCNC: 19 MG/DL (ref 8–27)
BUN/CREAT SERPL: 17 (ref 10–24)
CALCIUM SERPL-MCNC: 8.9 MG/DL (ref 8.6–10.2)
CHLORIDE SERPL-SCNC: 110 MMOL/L (ref 96–106)
CHOLEST SERPL-MCNC: 137 MG/DL (ref 100–199)
CO2 SERPL-SCNC: 23 MMOL/L (ref 20–29)
CREAT SERPL-MCNC: 1.09 MG/DL (ref 0.76–1.27)
EOSINOPHIL # BLD AUTO: 0.1 X10E3/UL (ref 0–0.4)
EOSINOPHIL NFR BLD AUTO: 3 %
ERYTHROCYTE [DISTWIDTH] IN BLOOD BY AUTOMATED COUNT: 12.8 % (ref 11.6–15.4)
EST. AVERAGE GLUCOSE BLD GHB EST-MCNC: 128 MG/DL
GLOBULIN SER CALC-MCNC: 2.7 G/DL (ref 1.5–4.5)
GLUCOSE SERPL-MCNC: 128 MG/DL (ref 65–99)
HBA1C MFR BLD: 6.1 % (ref 4.8–5.6)
HCT VFR BLD AUTO: 37.6 % (ref 37.5–51)
HDLC SERPL-MCNC: 46 MG/DL
HGB BLD-MCNC: 12.8 G/DL (ref 13–17.7)
IMM GRANULOCYTES # BLD AUTO: 0 X10E3/UL (ref 0–0.1)
IMM GRANULOCYTES NFR BLD AUTO: 1 %
LDLC SERPL CALC-MCNC: 70 MG/DL (ref 0–99)
LYMPHOCYTES # BLD AUTO: 1 X10E3/UL (ref 0.7–3.1)
LYMPHOCYTES NFR BLD AUTO: 22 %
MCH RBC QN AUTO: 29.4 PG (ref 26.6–33)
MCHC RBC AUTO-ENTMCNC: 34 G/DL (ref 31.5–35.7)
MCV RBC AUTO: 86 FL (ref 79–97)
MONOCYTES # BLD AUTO: 0.6 X10E3/UL (ref 0.1–0.9)
MONOCYTES NFR BLD AUTO: 14 %
NEUTROPHILS # BLD AUTO: 2.7 X10E3/UL (ref 1.4–7)
NEUTROPHILS NFR BLD AUTO: 59 %
PLATELET # BLD AUTO: 140 X10E3/UL (ref 150–450)
POTASSIUM SERPL-SCNC: 4.1 MMOL/L (ref 3.5–5.2)
PROT SERPL-MCNC: 6.8 G/DL (ref 6–8.5)
RBC # BLD AUTO: 4.35 X10E6/UL (ref 4.14–5.8)
SODIUM SERPL-SCNC: 145 MMOL/L (ref 134–144)
TRIGL SERPL-MCNC: 118 MG/DL (ref 0–149)
VLDLC SERPL CALC-MCNC: 21 MG/DL (ref 5–40)
WBC # BLD AUTO: 4.5 X10E3/UL (ref 3.4–10.8)

## 2021-06-21 DIAGNOSIS — E55.9 VITAMIN D DEFICIENCY: Primary | ICD-10-CM

## 2021-06-21 RX ORDER — ERGOCALCIFEROL 1.25 MG/1
50000 CAPSULE ORAL
Qty: 12 CAPSULE | Refills: 1 | Status: SHIPPED | OUTPATIENT
Start: 2021-06-21 | End: 2022-07-12

## 2021-06-21 NOTE — PROGRESS NOTES
Please notify patient. 1. His Cholesterol is at goal. Continue Lipitor 80 mg.  2. His Vitamin D levels were very low. I will call in an 50,000 unit supplement prescription to be taken once a week. Vitamin D is very important for your bone health and for infection fighting. We will use this for 6 months and the levels should be repeated. 3. Metabolic panel showed you need to drink more water but no acute dehydration.   4. Your A1C is at goal for blood sugar management

## 2021-06-30 ENCOUNTER — OFFICE VISIT (OUTPATIENT)
Dept: INTERNAL MEDICINE CLINIC | Age: 81
End: 2021-06-30
Payer: MEDICARE

## 2021-06-30 VITALS
HEART RATE: 82 BPM | TEMPERATURE: 98.1 F | DIASTOLIC BLOOD PRESSURE: 82 MMHG | WEIGHT: 222.2 LBS | OXYGEN SATURATION: 91 % | HEIGHT: 68 IN | RESPIRATION RATE: 16 BRPM | SYSTOLIC BLOOD PRESSURE: 138 MMHG | BODY MASS INDEX: 33.68 KG/M2

## 2021-06-30 DIAGNOSIS — Z86.73 HISTORY OF CVA (CEREBROVASCULAR ACCIDENT): ICD-10-CM

## 2021-06-30 DIAGNOSIS — I12.9 BENIGN HYPERTENSION WITH CHRONIC KIDNEY DISEASE, STAGE III (HCC): ICD-10-CM

## 2021-06-30 DIAGNOSIS — N18.30 BENIGN HYPERTENSION WITH CHRONIC KIDNEY DISEASE, STAGE III (HCC): ICD-10-CM

## 2021-06-30 DIAGNOSIS — R06.02 SOB (SHORTNESS OF BREATH): Primary | ICD-10-CM

## 2021-06-30 DIAGNOSIS — I25.10 CORONARY ARTERY DISEASE INVOLVING NATIVE CORONARY ARTERY OF NATIVE HEART WITHOUT ANGINA PECTORIS: ICD-10-CM

## 2021-06-30 DIAGNOSIS — R60.0 LOCALIZED EDEMA: ICD-10-CM

## 2021-06-30 PROCEDURE — 99214 OFFICE O/P EST MOD 30 MIN: CPT | Performed by: INTERNAL MEDICINE

## 2021-06-30 PROCEDURE — G8752 SYS BP LESS 140: HCPCS | Performed by: INTERNAL MEDICINE

## 2021-06-30 PROCEDURE — 1101F PT FALLS ASSESS-DOCD LE1/YR: CPT | Performed by: INTERNAL MEDICINE

## 2021-06-30 PROCEDURE — 93000 ELECTROCARDIOGRAM COMPLETE: CPT | Performed by: INTERNAL MEDICINE

## 2021-06-30 PROCEDURE — G8754 DIAS BP LESS 90: HCPCS | Performed by: INTERNAL MEDICINE

## 2021-06-30 PROCEDURE — G8536 NO DOC ELDER MAL SCRN: HCPCS | Performed by: INTERNAL MEDICINE

## 2021-06-30 PROCEDURE — G8510 SCR DEP NEG, NO PLAN REQD: HCPCS | Performed by: INTERNAL MEDICINE

## 2021-06-30 PROCEDURE — G8417 CALC BMI ABV UP PARAM F/U: HCPCS | Performed by: INTERNAL MEDICINE

## 2021-06-30 PROCEDURE — G8427 DOCREV CUR MEDS BY ELIG CLIN: HCPCS | Performed by: INTERNAL MEDICINE

## 2021-06-30 RX ORDER — FUROSEMIDE 20 MG/1
20-40 TABLET ORAL DAILY
Qty: 30 TABLET | Refills: 1 | Status: SHIPPED | OUTPATIENT
Start: 2021-06-30 | End: 2021-08-04 | Stop reason: SDUPTHER

## 2021-06-30 NOTE — PROGRESS NOTES
HPI  Mr. Nichelle Mejia is a 79y.o. year old male, he is seen today for follow up HTN. Has been feeling overall well. He is accompanied by his granddaughter. He is somewhat of a poor historian. No chest pain, complains of shortness of breath. No f/c or sweats. No cough. Possible orthopnea, no PND. Edema in legs is worse than usual, better in morning, worse as day goes on. Drinks a lot of soda, not much water. Doesn't check BP. Complains of falling frequently. Rarely uses walker. Left leg weakness and falling since CVA in 10/2020. Says his left foot doesn't extend like it should and that is when he falls or stumbles. Notes that his legs feel heavy and tight with extra edema. No new weakness headaches vision changes or dizziness. He does not follow a low-sodium diet. In fact he eats high sodium foods. Dr. Antonio Rao - cardiologist - will see next week    Chief Complaint   Patient presents with    Hypertension     2B//     Foot Swelling     L and R comes and goes     Breathing Problem     Ongoing for a couple of months         Prior to Admission medications    Medication Sig Start Date End Date Taking? Authorizing Provider   furosemide (LASIX) 20 mg tablet Take 1-2 Tablets by mouth daily. 6/30/21  Yes Tomer Graham MD   cyanocobalamin 1,000 mcg tablet Take 1,000 mcg by mouth daily. Yes Provider, Historical   hydroCHLOROthiazide (HYDRODIURIL) 12.5 mg tablet TAKE 1 TABLET BY MOUTH EVERY DAY 5/7/21  Yes Vicky Carreon PA-C   atorvastatin (LIPITOR) 80 mg tablet TAKE ONE TABLET ONCE DAILY 1/29/21  Yes Tomer Graham MD   amLODIPine (NORVASC) 10 mg tablet TAKE ONE TABLET ONCE DAILY  (HIGH BLOOD PRESSURE) 1/29/21  Yes Tomer Graham MD   carvediloL (COREG) 25 mg tablet Take 1 Tab by mouth two (2) times daily (with meals). 1/29/21  Yes Tomer Graham MD   acetaminophen (TYLENOL) 500 mg tablet Take 1,000 mg by mouth daily as needed for Pain.    Yes Provider, Historical   aspirin 81 mg chewable tablet Take 1 Tab by mouth nightly. 3/10/17  Yes Olga Magaña MD   ergocalciferol (ERGOCALCIFEROL) 1,250 mcg (50,000 unit) capsule Take 1 Capsule by mouth every seven (7) days. 6/21/21   Desiree Albright PA-C   donepeziL (ARICEPT) 5 mg tablet Take  by mouth nightly. Provider, Historical         Allergies   Allergen Reactions    Percocet [Oxycodone-Acetaminophen] Other (comments)     \"feel weird\"         REVIEW OF SYSTEMS:  Per HPI    PHYSICAL EXAM:  Visit Vitals  /82   Pulse 82   Temp 98.1 °F (36.7 °C) (Oral)   Resp 16   Ht 5' 8\" (1.727 m)   Wt 222 lb 3.2 oz (100.8 kg)   SpO2 91%   BMI 33.79 kg/m²     Constitutional: Appears well-developed and well-nourished. No distress. HENT:   Head: Normocephalic and atraumatic. Eyes: No scleral icterus. Neck: no lad, no tm, supple   Cardiovascular: Normal S1/S2, regular rhythm. No murmurs, rubs, or gallops. Pulmonary/Chest: Effort normal and breath sounds normal. No respiratory distress. No wheezes, rhonchi, or rales. Abdomen: Soft, NT/ND, +BS, no rebound or guarding. Ext: 2+ pedal edema to trace posterior thighs b/l. Neurological: Alert. Strength 5/5 b/l LE and UE. Psychiatric: Normal mood and affect. Behavior is normal.     Lab Results   Component Value Date/Time    Sodium 145 (H) 06/18/2021 11:53 AM    Potassium 4.1 06/18/2021 11:53 AM    Chloride 110 (H) 06/18/2021 11:53 AM    CO2 23 06/18/2021 11:53 AM    Anion gap 6 05/28/2019 07:58 AM    Glucose 128 (H) 06/18/2021 11:53 AM    BUN 19 06/18/2021 11:53 AM    Creatinine 1.09 06/18/2021 11:53 AM    BUN/Creatinine ratio 17 06/18/2021 11:53 AM    GFR est AA 73 06/18/2021 11:53 AM    GFR est non-AA 63 06/18/2021 11:53 AM    Calcium 8.9 06/18/2021 11:53 AM    Bilirubin, total 0.5 06/18/2021 11:53 AM    Alk.  phosphatase 85 06/18/2021 11:53 AM    Protein, total 6.8 06/18/2021 11:53 AM    Albumin 4.1 06/18/2021 11:53 AM    Globulin 3.7 05/28/2019 07:58 AM    A-G Ratio 1.5 06/18/2021 11:53 AM ALT (SGPT) 24 06/18/2021 11:53 AM     Lab Results   Component Value Date/Time    Hemoglobin A1c 6.1 (H) 06/18/2021 11:53 AM    Hemoglobin A1c 5.9 (H) 08/13/2018 01:35 PM    Hemoglobin A1c 5.6 09/18/2017 09:15 AM      Lab Results   Component Value Date/Time    Cholesterol, total 137 06/18/2021 11:53 AM    HDL Cholesterol 46 06/18/2021 11:53 AM    LDL, calculated 70 06/18/2021 11:53 AM    LDL, calculated 81 05/21/2019 10:49 AM    VLDL, calculated 21 06/18/2021 11:53 AM    VLDL, calculated 25 05/21/2019 10:49 AM    Triglyceride 118 06/18/2021 11:53 AM    CHOL/HDL Ratio 2.7 07/09/2010 09:43 AM          ASSESSMENT/PLAN  Diagnoses and all orders for this visit:    1. SOB (shortness of breath)  -     AMB POC EKG ROUTINE W/ 12 LEADS, INTER & REP    2. Localized edema  -     furosemide (LASIX) 20 mg tablet; Take 1-2 Tablets by mouth daily. 3. Benign hypertension with chronic kidney disease, stage III ([facility]    4. History of CVA (cerebrovascular accident)    5. Coronary artery disease involving native coronary artery of native heart without angina pectoris    EKG is unchanged compared with August 13, 2018. It shows left axis deviation, left atrial enlargement, and old anterior infarct. Suspect his shortness of breath and edema is related to dietary indiscretions with high sodium diet. Discussed low-sodium diet with the patient and his granddaughter. In addition we will add Lasix 20 to 40 mg daily for the next several days until edema improves. If he develops severe shortness of breath or chest pain he should go to the emergency room. He has follow-up with his cardiologist next week. His blood pressure is at goal. Recommended using a walker at all times. His edema and history of CVA are contributing.       Health Maintenance Due   Topic Date Due    DTaP/Tdap/Td series (1 - Tdap) Never done    Shingrix Vaccine Age 50> (1 of 2) Never done    Medicare Yearly Exam  05/29/2021        Follow-up and Dispositions    · Return in about 2 weeks (around 7/14/2021) for swelling, sob. Reviewed plan of care. Patient has provided input and agrees with goals. The nurse provided the patient and/or family with advanced directive information if needed and encouraged the patient to provide a copy to the office when available.

## 2021-06-30 NOTE — PROGRESS NOTES
Nikita Thakur 73  Identified pt with two pt identifiers(name and ). Chief Complaint   Patient presents with    Hypertension     2B//     Foot Swelling     L and R comes and goes     Breathing Problem     Ongoing for a couple of months        Reviewed record In preparation for visit and have obtained necessary documentation. 1. Have you been to the ER, urgent care clinic or hospitalized since your last visit? No     2. Have you seen or consulted any other health care providers outside of the [address] [place] [address] since your last visit? Include any pap smears or colon screening. No    Patient does not have an advance directive. Vitals reviewed with provider.     Health Maintenance reviewed:     Health Maintenance Due   Topic    DTaP/Tdap/Td series (1 - Tdap)    Shingrix Vaccine Age 49> (1 of 2)    Medicare Yearly Exam           Wt Readings from Last 3 Encounters:   21 222 lb 3.2 oz (100.8 kg)   21 209 lb (94.8 kg)   20 199 lb (90.3 kg)        Temp Readings from Last 3 Encounters:   21 98.1 °F (36.7 °C) (Oral)   21 98.9 °F (37.2 °C) (Oral)   20 98.2 °F (36.8 °C) (Oral)        BP Readings from Last 3 Encounters:   21 (!) 177/93   21 (!) 153/92   20 128/82        Pulse Readings from Last 3 Encounters:   21 82   21 92   20 80        Vitals:    21 1149   BP: (!) 177/93   Pulse: 82   Resp: 16   Temp: 98.1 °F (36.7 °C)   TempSrc: Oral   SpO2: 91%   Weight: 222 lb 3.2 oz (100.8 kg)   Height: 5' 8\" (1.727 m)   PainSc:   0 - No pain          Learning Assessment:   :       Learning Assessment 10/14/2014   PRIMARY LEARNER Patient   HIGHEST LEVEL OF EDUCATION - PRIMARY LEARNER  GRADUATED HIGH SCHOOL OR GED   PRIMARY LANGUAGE ENGLISH   LEARNER PREFERENCE PRIMARY LISTENING   ANSWERED BY self   RELATIONSHIP SELF        Depression Screening:   :       3 most recent PHQ Screens 2021   Little interest or pleasure in doing things Not at all   Feeling down, depressed, irritable, or hopeless Not at all   Total Score PHQ 2 0   Trouble falling or staying asleep, or sleeping too much -   Feeling tired or having little energy -   Poor appetite, weight loss, or overeating -   Feeling bad about yourself - or that you are a failure or have let yourself or your family down -   Trouble concentrating on things such as school, work, reading, or watching TV -   Moving or speaking so slowly that other people could have noticed; or the opposite being so fidgety that others notice -   Thoughts of being better off dead, or hurting yourself in some way -   PHQ 9 Score -   How difficult have these problems made it for you to do your work, take care of your home and get along with others -        Fall Risk Assessment:   :       Fall Risk Assessment, last 12 mths 6/30/2021   Able to walk? Yes   Fall in past 12 months? 1   Do you feel unsteady? 1   Are you worried about falling 1   Is TUG test greater than 12 seconds? 0   Is the gait abnormal? 0   Number of falls in past 12 months 2   Fall with injury? 1        Abuse Screening:   :       Abuse Screening Questionnaire 6/30/2021 5/28/2020 4/11/2019 3/29/2018 3/10/2017 3/29/2016 3/12/2015   Do you ever feel afraid of your partner? N N N N N N N   Are you in a relationship with someone who physically or mentally threatens you? N N N N N N N   Is it safe for you to go home?  Y Y Y Y Y Y Y        ADL Screening:   :       ADL Assessment 6/30/2021   Feeding yourself No Help Needed   Getting from bed to chair No Help Needed   Getting dressed No Help Needed   Bathing or showering No Help Needed   Walk across the room (includes cane/walker) No Help Needed   Using the telphone No Help Needed   Taking your medications No Help Needed   Preparing meals No Help Needed   Managing money (expenses/bills) No Help Needed   Moderately strenuous housework (laundry) No Help Needed   Shopping for personal items (toiletries/medicines) No Help Needed   Shopping for groceries No Help Needed   Driving No Help Needed   Climbing a flight of stairs No Help Needed   Getting to places beyond walking distances No Help Needed      [facility]  Identified pt with two pt identifiers(name and ). Chief Complaint   Patient presents with    Hypertension     2B//     Foot Swelling     L and R comes and goes     Breathing Problem     Ongoing for a couple of months        Reviewed record In preparation for visit and have obtained necessary documentation. 1. Have you been to the ER, urgent care clinic or hospitalized since your last visit? No     2. Have you seen or consulted any other health care providers outside of the [facility] since your last visit? Include any pap smears or colon screening. [facility] for swelling      Patient does not have an advance directive. Vitals reviewed with provider.     Health Maintenance reviewed:     Health Maintenance Due   Topic    DTaP/Tdap/Td series (1 - Tdap)    Shingrix Vaccine Age 49> (1 of 2)    Medicare Yearly Exam           Wt Readings from Last 3 Encounters:   21 222 lb 3.2 oz (100.8 kg)   21 209 lb (94.8 kg)   20 199 lb (90.3 kg)        Temp Readings from Last 3 Encounters:   21 98.1 °F (36.7 °C) (Oral)   21 98.9 °F (37.2 °C) (Oral)   20 98.2 °F (36.8 °C) (Oral)        BP Readings from Last 3 Encounters:   21 (!) 177/93   21 (!) 153/92   20 128/82        Pulse Readings from Last 3 Encounters:   21 82   21 92   20 80        Vitals:    21 1149   BP: (!) 177/93   Pulse: 82   Resp: 16   Temp: 98.1 °F (36.7 °C)   TempSrc: Oral   SpO2: 91%   Weight: 222 lb 3.2 oz (100.8 kg)   Height: 5' 8\" (1.727 m)   PainSc:   0 - No pain          Learning Assessment:   :       Learning Assessment 10/14/2014   PRIMARY LEARNER Patient   HIGHEST LEVEL OF EDUCATION - PRIMARY LEARNER  GRADUATED HIGH SCHOOL OR GED   PRIMARY LANGUAGE ENGLISH   LEARNER PREFERENCE PRIMARY LISTENING   ANSWERED BY self   RELATIONSHIP SELF        Depression Screening:   :       3 most recent PHQ Screens 6/30/2021   Little interest or pleasure in doing things Not at all   Feeling down, depressed, irritable, or hopeless Not at all   Total Score PHQ 2 0   Trouble falling or staying asleep, or sleeping too much -   Feeling tired or having little energy -   Poor appetite, weight loss, or overeating -   Feeling bad about yourself - or that you are a failure or have let yourself or your family down -   Trouble concentrating on things such as school, work, reading, or watching TV -   Moving or speaking so slowly that other people could have noticed; or the opposite being so fidgety that others notice -   Thoughts of being better off dead, or hurting yourself in some way -   PHQ 9 Score -   How difficult have these problems made it for you to do your work, take care of your home and get along with others -        Fall Risk Assessment:   :       Fall Risk Assessment, last 12 mths 6/30/2021   Able to walk? Yes   Fall in past 12 months? 1   Do you feel unsteady? 1   Are you worried about falling 1   Is TUG test greater than 12 seconds? 0   Is the gait abnormal? 0   Number of falls in past 12 months 2   Fall with injury? 1        Abuse Screening:   :       Abuse Screening Questionnaire 6/30/2021 5/28/2020 4/11/2019 3/29/2018 3/10/2017 3/29/2016 3/12/2015   Do you ever feel afraid of your partner? N N N N N N N   Are you in a relationship with someone who physically or mentally threatens you? N N N N N N N   Is it safe for you to go home?  Y Y Y Y Y Y Y        ADL Screening:   :       ADL Assessment 6/30/2021   Feeding yourself No Help Needed   Getting from bed to chair No Help Needed   Getting dressed No Help Needed   Bathing or showering No Help Needed   Walk across the room (includes cane/walker) No Help Needed   Using the telphone No Help Needed   Taking your medications No Help Needed   Preparing meals No Help Needed   Managing money (expenses/bills) No Help Needed   Moderately strenuous housework (laundry) No Help Needed   Shopping for personal items (toiletries/medicines) No Help Needed   Shopping for groceries No Help Needed   Driving No Help Needed   Climbing a flight of stairs No Help Needed   Getting to places beyond walking distances No Help Needed

## 2021-06-30 NOTE — PATIENT INSTRUCTIONS
How to Read a Food Label to Limit Sodium: Care Instructions  Overview  Limiting sodium can be an important part of managing some health problems. Processed foods, fast food, and restaurant foods are the major sources of dietary sodium. The most common name for sodium is salt. Most packaged foods have a Nutrition Facts label. This will tell you how much sodium is in one serving of food. Follow-up care is a key part of your treatment and safety. Be sure to make and go to all appointments, and call your doctor if you are having problems. It's also a good idea to know your test results and keep a list of the medicines you take. How can you care for yourself at home? Read ingredient lists on food labels  · Read the list of ingredients on food labels to help you find how much sodium is in a food. The label lists the ingredients in a food in descending order (from the most to the least). If salt or sodium is high on the list, there may be a lot of sodium in the food. · Know that sodium has different names. Sodium is also called monosodium glutamate (MSG, common in [facility] food), sodium citrate, sodium alginate, and sodium phosphate. Read Nutrition Facts labels  · On most foods, there is a Nutrition Facts label. This will tell you how much sodium is in one serving of food. Look at both the serving size and the sodium amount. The serving size is located at the top of the label, usually right under the \"Nutrition Facts\" title. The amount of sodium is given in the list under the title. It is given in milligrams (mg). ? Check the serving size carefully. A single serving is often very small, and you may eat more than one serving. If this is the case, you will eat more sodium than listed on the label. For example, if the serving size for a canned soup is 1 cup and the sodium amount is 470 mg, if you have 2 cups you will eat 940 mg of sodium.   · The nutrition facts for fresh fruits and vegetables are not listed on the food. They may be listed somewhere in the store. These foods usually have no sodium or low sodium. · The Nutrition Facts label also gives you the Percent Daily Value for sodium. This is how much of the recommended amount of sodium a serving contains. The daily value for sodium is less than 2,300 mg. So if the Percent Daily Value says 50%, this means one serving is giving you half of this, or 1,150 mg. Buy low-sodium foods  · Look for foods that are made with less sodium. Watch for the following words on the label. ? \"Unsalted\" means there is no sodium added to the food. But there may be sodium already in the food naturally. ? \"Sodium-free\" means a serving has less than 5 mg of sodium. ? \"Very low sodium\" means a serving has 35 mg or less of sodium. ? \"Low-sodium\" means a serving has 140 mg or less of sodium. · \"Reduced-sodium\" means that there is 25% less sodium than what the food normally has. This is still usually too much sodium. Try not to buy foods with this on the label. · Buy fresh vegetables, or frozen vegetables without added sauces. Buy low-sodium versions of canned vegetables, soups, and other canned goods. Where can you learn more? Go to http://www.gray.com/  Enter B757 in the search box to learn more about \"How to Read a Food Label to Limit Sodium: Care Instructions. \"  Current as of: December 17, 2020               Content Version: 12.8  © 2006-2021 Healthwise, [facility]. Care instructions adapted under license by [employer] (which disclaims liability or warranty for this information). If you have questions about a medical condition or this instruction, always ask your healthcare professional. [place] [address] any warranty or liability for your use of this information. Learning About Low-Sodium Foods  What foods are low in sodium? The foods you eat contain nutrients, such as vitamins and minerals. Sodium is a nutrient.  Your body needs the right amount to stay healthy and work as it should. You can use the list below to help you make choices about which foods to eat. Fruits  · Fresh, frozen, canned, or dried fruit  Vegetables  · Fresh or frozen vegetables, with no added salt  · Canned vegetables, low-sodium or with no added salt  Grains  · Bagels without salted tops  · Cereal with no added salt  · Corn tortillas  · Crackers with no added salt  · Oatmeal, cooked without salt  · Popcorn with no salt  · Pasta and noodles, cooked without salt  · Rice, cooked without salt  · Unsalted pretzels  Dairy and dairy alternatives  · Butter, unsalted  · Cream cheese  · Ice cream  · Milk  · Soy milk  Meats and other protein foods  · Beans and peas, canned with no salt  · Eggs  · Fresh fish (not smoked)  · Fresh meats (not smoked or cured)  · Nuts and nut butter, prepared without salt  · Poultry, not packaged with sodium solution  · Tofu, unseasoned  · Tuna, canned without salt  Seasonings  · Garlic  · Herbs and spices  · Lemon juice  · Mustard  · Olive oil  · Salt-free seasoning mixes  · Vinegar  Work with your doctor to find out how much of this nutrient you need. Depending on your health, you may need more or less of it in your diet. Where can you learn more? Go to http://www.gray.com/  Enter S460 in the search box to learn more about \"Learning About Low-Sodium Foods. \"  Current as of: December 17, 2020               Content Version: 12.8  © 2006-2021 [employer]. Care instructions adapted under license by [employer] (which disclaims liability or warranty for this information). If you have questions about a medical condition or this instruction, always ask your healthcare professional. [place] [address] any warranty or liability for your use of this information.          Low Sodium Diet (2,000 Milligram): Care Instructions  Overview     Limiting sodium can be an important part of managing some health problems. The most common source of sodium is salt. People get most of the salt in their diet from canned, prepared, and packaged foods. Fast food and restaurant meals also are very high in sodium. Your doctor will probably limit your sodium to less than 2,000 milligrams (mg) a day. This limit counts all the sodium in prepared and packaged foods and any salt you add to your food. Follow-up care is a key part of your treatment and safety. Be sure to make and go to all appointments, and call your doctor if you are having problems. It's also a good idea to know your test results and keep a list of the medicines you take. How can you care for yourself at home? Read food labels  · Read labels on cans and food packages. The labels tell you how much sodium is in each serving. Make sure that you look at the serving size. If you eat more than the serving size, you have eaten more sodium. · Food labels also tell you the Percent Daily Value for sodium. Choose products with low Percent Daily Values for sodium. · Be aware that sodium can come in forms other than salt, including monosodium glutamate (MSG), sodium citrate, and sodium bicarbonate (baking soda). MSG is often added to Asian food. When you eat out, you can sometimes ask for food without MSG or added salt. Buy low-sodium foods  · Buy foods that are labeled \"unsalted\" (no salt added), \"sodium-free\" (less than 5 mg of sodium per serving), or \"low-sodium\" (140 mg or less of sodium per serving). Foods labeled \"reduced-sodium\" and \"light sodium\" may still have too much sodium. Be sure to read the label to see how much sodium you are getting. · Buy fresh vegetables, or frozen vegetables without added sauces. Buy low-sodium versions of canned vegetables, soups, and other canned goods. Prepare low-sodium meals  · Cut back on the amount of salt you use in cooking. This will help you adjust to the taste. Do not add salt after cooking.  One teaspoon of salt has about 2,300 mg of sodium. · Take the salt shaker off the table. · Flavor your food with garlic, lemon juice, onion, vinegar, herbs, and spices. Do not use soy sauce, lite soy sauce, steak sauce, onion salt, garlic salt, celery salt, or ketchup on your food. · Use low-sodium salad dressings, sauces, and ketchup. Or make your own salad dressings and sauces without adding salt. · Use less salt (or none) when recipes call for it. You can often use half the salt a recipe calls for without losing flavor. Other foods such as rice, pasta, and grains do not need added salt. · Rinse canned vegetables, and cook them in fresh water. This removes somebut not allof the salt. · Avoid water that is naturally high in sodium or that has been treated with water softeners, which add sodium. If you buy bottled water, read the label and choose a sodium-free brand. Avoid high-sodium foods  · Avoid eating:  ? Smoked, cured, salted, and canned meat, fish, and poultry. ? Ham, garcia, hot dogs, and luncheon meats. ? Regular, hard, and processed cheese and regular peanut butter. ? Crackers with salted tops, and other salted snack foods such as pretzels, chips, and salted popcorn. ? Frozen prepared meals, unless labeled low-sodium. ? Canned and dried soups, broths, and bouillon, unless labeled sodium-free or low-sodium. ? Canned vegetables, unless labeled sodium-free or low-sodium. ? Western Katia fries, pizza, tacos, and other fast foods. ? Pickles, olives, ketchup, and other condiments, especially soy sauce, unless labeled sodium-free or low-sodium. Where can you learn more? Go to http://www.gray.com/  Enter V843 in the search box to learn more about \"Low Sodium Diet (2,000 Milligram): Care Instructions. \"  Current as of: December 17, 2020               Content Version: 12.8  © 7695-6502 [email or web address].    Care instructions adapted under license by [employer] (which disclaims liability or warranty for this information). If you have questions about a medical condition or this instruction, always ask your healthcare professional. [place] [address] any warranty or liability for your use of this information.

## 2021-08-04 DIAGNOSIS — R60.0 LOCALIZED EDEMA: ICD-10-CM

## 2021-08-04 RX ORDER — FUROSEMIDE 20 MG/1
20-40 TABLET ORAL DAILY
Qty: 30 TABLET | Refills: 1 | Status: SHIPPED | OUTPATIENT
Start: 2021-08-04 | End: 2022-07-12 | Stop reason: SDUPTHER

## 2021-08-04 NOTE — TELEPHONE ENCOUNTER
PCP: Natalie Chandler MD    Last appt: 7/14/2021  No future appointments. Requested Prescriptions     Pending Prescriptions Disp Refills    furosemide (LASIX) 20 mg tablet 30 Tablet 1     Sig: Take 1-2 Tablets by mouth daily.

## 2021-11-12 ENCOUNTER — TELEPHONE (OUTPATIENT)
Dept: INTERNAL MEDICINE CLINIC | Age: 81
End: 2021-11-12

## 2021-11-12 DIAGNOSIS — I69.359 CVA, OLD, HEMIPARESIS (HCC): Primary | ICD-10-CM

## 2021-11-12 NOTE — TELEPHONE ENCOUNTER
Pt called and wanted to speak to the provider.  Pt stated that he thinks he needs to have more home health PT and OT and would like to discuss

## 2021-11-12 NOTE — TELEPHONE ENCOUNTER
Returned phone call to pt. HIPAA verified by two patient identifiers. Pt states that he had a stroke about a year ago, his walking is getting worse along with sciatic pain in back. Uses a cane and a walker but would like to do PT to help improve walking without either. Pt has been scheduled for earliest available, 1/18. Advised that I will get message to PCP and call back once she responds. Pt verified understanding.

## 2021-11-12 NOTE — TELEPHONE ENCOUNTER
Returned phone call to pt, spoke with wife, HIPAA verified by two patient identifiers. Wife advised that pt had stepped out but will call back. Verified understanding.

## 2021-11-15 NOTE — TELEPHONE ENCOUNTER
Called pt, HIPAA verified by two patient identifiers. Advised pt that order for home health for PT/OT has been ordered by PCP. Advised pt to call insurance company to see who they prefer, call me back with the name and phone number so that I can fax them the referral. Pt verified understanding.

## 2021-12-14 ENCOUNTER — TELEPHONE (OUTPATIENT)
Dept: INTERNAL MEDICINE CLINIC | Age: 81
End: 2021-12-14

## 2021-12-15 NOTE — PROGRESS NOTES
Nikita Pavon  Identified pt with two pt identifiers(name and ). Chief Complaint   Patient presents with    Dysphagia     Pt states that it is from the stroke that he had in     [facility] Visit       Reviewed record In preparation for visit and have obtained necessary documentation. 1. Have you been to the ER, urgent care clinic or hospitalized since your last visit? No     2. Have you seen or consulted any other health care providers outside of the [address] [place] [address] since your last visit? Include any pap smears or colon screening. No    Patient does not have an advance directive. Vitals reviewed with provider.     Health Maintenance reviewed:     Health Maintenance Due   Topic    Medicare Yearly Exam     Flu Vaccine (1)          Wt Readings from Last 3 Encounters:   21 208 lb 12.8 oz (94.7 kg)   21 222 lb 3.2 oz (100.8 kg)   21 209 lb (94.8 kg)        Temp Readings from Last 3 Encounters:   21 98 °F (36.7 °C) (Oral)   21 98.1 °F (36.7 °C) (Oral)   21 98.9 °F (37.2 °C) (Oral)        BP Readings from Last 3 Encounters:   21 (!) 161/97   21 138/82   21 (!) 153/92        Pulse Readings from Last 3 Encounters:   21 76   21 82   21 92        Vitals:    21 1358   BP: (!) 161/97   Pulse: 76   Resp: 16   Temp: 98 °F (36.7 °C)   TempSrc: Oral   SpO2: 94%   Weight: 208 lb 12.8 oz (94.7 kg)   Height: 5' 8\" (1.727 m)   PainSc:   0 - No pain          Learning Assessment:   :       Learning Assessment 10/14/2014   PRIMARY LEARNER Patient   HIGHEST LEVEL OF EDUCATION - PRIMARY LEARNER  GRADUATED HIGH SCHOOL OR GED   PRIMARY LANGUAGE ENGLISH   LEARNER PREFERENCE PRIMARY LISTENING   ANSWERED BY self   RELATIONSHIP SELF        Depression Screening:   :       3 most recent PHQ Screens 2021   Little interest or pleasure in doing things Not at all   Feeling down, depressed, irritable, or hopeless Several days Total Score PHQ 2 1   Trouble falling or staying asleep, or sleeping too much -   Feeling tired or having little energy -   Poor appetite, weight loss, or overeating -   Feeling bad about yourself - or that you are a failure or have let yourself or your family down -   Trouble concentrating on things such as school, work, reading, or watching TV -   Moving or speaking so slowly that other people could have noticed; or the opposite being so fidgety that others notice -   Thoughts of being better off dead, or hurting yourself in some way -   PHQ 9 Score -   How difficult have these problems made it for you to do your work, take care of your home and get along with others -        Fall Risk Assessment:   :       Fall Risk Assessment, last 12 mths 12/16/2021   Able to walk? Yes   Fall in past 12 months? 0   Do you feel unsteady? 1   Are you worried about falling 1   Is TUG test greater than 12 seconds? 0   Is the gait abnormal? 0   Number of falls in past 12 months -   Fall with injury? -        Abuse Screening:   :       Abuse Screening Questionnaire 12/16/2021 6/30/2021 5/28/2020 4/11/2019 3/29/2018 3/10/2017 3/29/2016   Do you ever feel afraid of your partner? N N N N N N N   Are you in a relationship with someone who physically or mentally threatens you? N N N N N N N   Is it safe for you to go home?  Y Y Y Y Y Y Y        ADL Screening:   :       ADL Assessment 12/16/2021   Feeding yourself No Help Needed   Getting from bed to chair No Help Needed   Getting dressed No Help Needed   Bathing or showering No Help Needed   Walk across the room (includes cane/walker) Help Needed   Using the telphone No Help Needed   Taking your medications No Help Needed   Preparing meals No Help Needed   Managing money (expenses/bills) No Help Needed   Moderately strenuous housework (laundry) No Help Needed   Shopping for personal items (toiletries/medicines) No Help Needed   Shopping for groceries No Help Needed   Driving No Help Needed   Climbing a flight of stairs No Help Needed   Getting to places beyond walking distances No Help Needed

## 2021-12-16 ENCOUNTER — OFFICE VISIT (OUTPATIENT)
Dept: INTERNAL MEDICINE CLINIC | Age: 81
End: 2021-12-16
Payer: MEDICARE

## 2021-12-16 VITALS
HEART RATE: 76 BPM | BODY MASS INDEX: 31.64 KG/M2 | OXYGEN SATURATION: 94 % | SYSTOLIC BLOOD PRESSURE: 128 MMHG | RESPIRATION RATE: 16 BRPM | HEIGHT: 68 IN | TEMPERATURE: 98 F | DIASTOLIC BLOOD PRESSURE: 78 MMHG | WEIGHT: 208.8 LBS

## 2021-12-16 DIAGNOSIS — N18.30 BENIGN HYPERTENSION WITH CHRONIC KIDNEY DISEASE, STAGE III (HCC): ICD-10-CM

## 2021-12-16 DIAGNOSIS — I12.9 BENIGN HYPERTENSION WITH CHRONIC KIDNEY DISEASE, STAGE III (HCC): ICD-10-CM

## 2021-12-16 DIAGNOSIS — E78.2 HYPERLIPIDEMIA, MIXED: ICD-10-CM

## 2021-12-16 DIAGNOSIS — I25.10 CORONARY ARTERY DISEASE INVOLVING NATIVE CORONARY ARTERY OF NATIVE HEART WITHOUT ANGINA PECTORIS: ICD-10-CM

## 2021-12-16 DIAGNOSIS — Z00.00 MEDICARE ANNUAL WELLNESS VISIT, SUBSEQUENT: Primary | ICD-10-CM

## 2021-12-16 DIAGNOSIS — Z86.73 HISTORY OF CVA (CEREBROVASCULAR ACCIDENT): ICD-10-CM

## 2021-12-16 DIAGNOSIS — Z23 NEEDS FLU SHOT: ICD-10-CM

## 2021-12-16 PROCEDURE — G8510 SCR DEP NEG, NO PLAN REQD: HCPCS | Performed by: INTERNAL MEDICINE

## 2021-12-16 PROCEDURE — G0008 ADMIN INFLUENZA VIRUS VAC: HCPCS | Performed by: INTERNAL MEDICINE

## 2021-12-16 PROCEDURE — 90694 VACC AIIV4 NO PRSRV 0.5ML IM: CPT | Performed by: INTERNAL MEDICINE

## 2021-12-16 PROCEDURE — 1101F PT FALLS ASSESS-DOCD LE1/YR: CPT | Performed by: INTERNAL MEDICINE

## 2021-12-16 PROCEDURE — G8752 SYS BP LESS 140: HCPCS | Performed by: INTERNAL MEDICINE

## 2021-12-16 PROCEDURE — 99214 OFFICE O/P EST MOD 30 MIN: CPT | Performed by: INTERNAL MEDICINE

## 2021-12-16 PROCEDURE — G8536 NO DOC ELDER MAL SCRN: HCPCS | Performed by: INTERNAL MEDICINE

## 2021-12-16 PROCEDURE — G8754 DIAS BP LESS 90: HCPCS | Performed by: INTERNAL MEDICINE

## 2021-12-16 PROCEDURE — G8417 CALC BMI ABV UP PARAM F/U: HCPCS | Performed by: INTERNAL MEDICINE

## 2021-12-16 PROCEDURE — G8427 DOCREV CUR MEDS BY ELIG CLIN: HCPCS | Performed by: INTERNAL MEDICINE

## 2021-12-16 PROCEDURE — G0439 PPPS, SUBSEQ VISIT: HCPCS | Performed by: INTERNAL MEDICINE

## 2021-12-16 NOTE — PATIENT INSTRUCTIONS
Medicare Wellness Visit, Male    The best way to live healthy is to have a lifestyle where you eat a well-balanced diet, exercise regularly, limit alcohol use, and quit all forms of tobacco/nicotine, if applicable. Regular preventive services are another way to keep healthy. Preventive services (vaccines, screening tests, monitoring & exams) can help personalize your care plan, which helps you manage your own care. Screening tests can find health problems at the earliest stages, when they are easiest to treat. [facility] follows the current, evidence-based guidelines published by the [facility] ([facility]) when recommending preventive services for our patients. Because we follow these guidelines, sometimes recommendations change over time as research supports it. (For example, a prostate screening blood test is no longer routinely recommended for men with no symptoms). Of course, you and your doctor may decide to screen more often for some diseases, based on your risk and co-morbidities (chronic disease you are already diagnosed with). Preventive services for you include:  - Medicare offers their members a free annual wellness visit, which is time for you and your primary care provider to discuss and plan for your preventive service needs. Take advantage of this benefit every year!  -All adults over age 72 should receive the recommended pneumonia vaccines. Current USPSTF guidelines recommend a series of two vaccines for the best pneumonia protection.   -All adults should have a flu vaccine yearly and tetanus vaccine every 10 years.  -All adults age 48 and older should receive the shingles vaccines (series of two vaccines).        -All adults age 38-68 who are overweight should have a diabetes screening test once every three years.   -Other screening tests & preventive services for persons with diabetes include: an eye exam to screen for diabetic retinopathy, a kidney function test, a foot exam, and stricter control over your cholesterol.   -Cardiovascular screening for adults with routine risk involves an electrocardiogram (ECG) at intervals determined by the provider.   -Colorectal cancer screening should be done for adults age 54-65 with no increased risk factors for colorectal cancer. There are a number of acceptable methods of screening for this type of cancer. Each test has its own benefits and drawbacks. Discuss with your provider what is most appropriate for you during your annual wellness visit. The different tests include: colonoscopy (considered the best screening method), a fecal occult blood test, a fecal DNA test, and sigmoidoscopy.  -All adults born between [facility] should be screened once for Hepatitis C.  -An Abdominal Aortic Aneurysm (AAA) Screening is recommended for men age 73-68 who has ever smoked in their lifetime. Here is a list of your current Health Maintenance items (your personalized list of preventive services) with a due date:  Health Maintenance Due   Topic Date Due    Yearly Flu Vaccine (1) 09/01/2021     Vaccine Information Statement    Influenza (Flu) Vaccine (Inactivated or Recombinant): What You Need to Know    Many vaccine information statements are available in Pashto and other languages. See www.immunize.org/vis. Hojas de información sobre vacunas están disponibles en español y en muchos otros idiomas. Visite www.immunize.org/vis. 1. Why get vaccinated? Influenza vaccine can prevent influenza (flu). Flu is a contagious disease that spreads around the [place] every year, usually between October and May. Anyone can get the flu, but it is more dangerous for some people. Infants and young children, people 72 years and older, pregnant people, and people with certain health conditions or a weakened immune system are at greatest risk of flu complications.     Pneumonia, bronchitis, sinus infections, and ear infections are examples of flu-related complications. If you have a medical condition, such as heart disease, cancer, or diabetes, flu can make it worse. Flu can cause fever and chills, sore throat, muscle aches, fatigue, cough, headache, and runny or stuffy nose. Some people may have vomiting and diarrhea, though this is more common in children than adults. In an average year, thousands of people in the [facility] die from flu, and many more are hospitalized. Flu vaccine prevents millions of illnesses and flu-related visits to the doctor each year. 2. Influenza vaccines     CDC recommends everyone 6 months and older get vaccinated every flu season. Children 6 months through 6years of age may need 2 doses during a single flu season. Everyone else needs only 1 dose each flu season. It takes about 2 weeks for protection to develop after vaccination. There are many flu viruses, and they are always changing. Each year a new flu vaccine is made to protect against the influenza viruses believed to be likely to cause disease in the upcoming flu season. Even when the vaccine doesnt exactly match these viruses, it may still provide some protection. Influenza vaccine does not cause flu. Influenza vaccine may be given at the same time as other vaccines. 3. Talk with your health care provider    Tell your vaccination provider if the person getting the vaccine:   Has had an allergic reaction after a previous dose of influenza vaccine, or has any severe, life-threatening allergies    Has ever had Guillain-Barré Syndrome (also called GBS)    In some cases, your health care provider may decide to postpone influenza vaccination until a future visit. Influenza vaccine can be administered at any time during pregnancy. People who are or will be pregnant during influenza season should receive inactivated influenza vaccine. People with minor illnesses, such as a cold, may be vaccinated. People who are moderately or severely ill should usually wait until they recover before getting influenza vaccine. Your health care provider can give you more information. 4. Risks of a vaccine reaction     Soreness, redness, and swelling where the shot is given, fever, muscle aches, and headache can happen after influenza vaccination.  There may be a very small increased risk of Guillain-Barré Syndrome (GBS) after inactivated influenza vaccine (the flu shot). Leoma March children who get the flu shot along with pneumococcal vaccine (PCV13) and/or DTaP vaccine at the same time might be slightly more likely to have a seizure caused by fever. Tell your health care provider if a child who is getting flu vaccine has ever had a seizure. People sometimes faint after medical procedures, including vaccination. Tell your provider if you feel dizzy or have vision changes or ringing in the ears. As with any medicine, there is a very remote chance of a vaccine causing a severe allergic reaction, other serious injury, or death. 5. What if there is a serious problem? An allergic reaction could occur after the vaccinated person leaves the clinic. If you see signs of a severe allergic reaction (hives, swelling of the face and throat, difficulty breathing, a fast heartbeat, dizziness, or weakness), call 9-1-1 and get the person to the nearest hospital.    For other signs that concern you, call your health care provider. Adverse reactions should be reported to the Vaccine Adverse Event Reporting System (VAERS). Your health care provider will usually file this report, or you can do it yourself. Visit the VAERS website at www.vaers. hhs.gov or call 9-778.125.7741. VAERS is only for reporting reactions, and VAERS staff members do not give medical advice.     6. The National Vaccine Injury Compensation Program    The [facility] Vaccine Injury Compensation Program (VICP) is a federal program that was created to compensate people who may have been injured by certain vaccines. Claims regarding alleged injury or death due to vaccination have a time limit for filing, which may be as short as two years. Visit the VICP website at www.hrsa.gov/vaccinecompensation or call 6-823.663.5898 to learn about the program and about filing a claim. 7. How can I learn more?  Ask your health care provider.  Call your local or state health department.  Visit the website of the Food and Drug Administration (FDA) for vaccine package inserts and additional information at www.fda.gov/vaccines-blood-biologics/vaccines.  Contact the Centers for Disease Control and Prevention (CDC):  - Call 5-393.231.1482 (1-800-CDC-INFO) or  - Visit CDCs influenza website at www.cdc.gov/flu. Vaccine Information Statement   Inactivated Influenza Vaccine   8/6/2021  42 MARINA Kowalski [id number]   Department of Health and Human Services  Centers for Disease Control and Prevention    Office Use Only

## 2021-12-16 NOTE — PROGRESS NOTES
After verbal order read back of Dr Jayce Casillas, patient received High Dose Flu Shot (Adjuvanted Fluad) in left deltoid. [address]: 28401-217-65 Lot: [id number] Exp: 06/30/2022. Patient tolerated procedure without complaints and received VIS.

## 2022-01-01 NOTE — TELEPHONE ENCOUNTER
Prescription signed Need records from [facility] and he needs SARBJIT appointment or I cannot continue to sign home health orders. 2022

## 2022-03-04 DIAGNOSIS — I12.9 BENIGN HYPERTENSION WITH CHRONIC KIDNEY DISEASE, STAGE III (HCC): ICD-10-CM

## 2022-03-04 DIAGNOSIS — N18.30 BENIGN HYPERTENSION WITH CHRONIC KIDNEY DISEASE, STAGE III (HCC): ICD-10-CM

## 2022-03-04 RX ORDER — CARVEDILOL 25 MG/1
TABLET ORAL
Qty: 60 TABLET | Refills: 11 | Status: SHIPPED | OUTPATIENT
Start: 2022-03-04 | End: 2022-07-12 | Stop reason: SDUPTHER

## 2022-04-01 ENCOUNTER — OFFICE VISIT (OUTPATIENT)
Dept: INTERNAL MEDICINE CLINIC | Age: 82
End: 2022-04-01
Payer: MEDICARE

## 2022-04-01 VITALS
BODY MASS INDEX: 30.46 KG/M2 | HEART RATE: 83 BPM | RESPIRATION RATE: 18 BRPM | TEMPERATURE: 98.4 F | DIASTOLIC BLOOD PRESSURE: 90 MMHG | WEIGHT: 201 LBS | HEIGHT: 68 IN | SYSTOLIC BLOOD PRESSURE: 142 MMHG | OXYGEN SATURATION: 95 %

## 2022-04-01 DIAGNOSIS — M48.062 SPINAL STENOSIS OF LUMBAR REGION WITH NEUROGENIC CLAUDICATION: Primary | ICD-10-CM

## 2022-04-01 DIAGNOSIS — R29.898 WEAKNESS OF BOTH LEGS: ICD-10-CM

## 2022-04-01 DIAGNOSIS — I69.359 CVA, OLD, HEMIPARESIS (HCC): ICD-10-CM

## 2022-04-01 DIAGNOSIS — E78.2 HYPERLIPIDEMIA, MIXED: ICD-10-CM

## 2022-04-01 DIAGNOSIS — N18.30 BENIGN HYPERTENSION WITH CHRONIC KIDNEY DISEASE, STAGE III (HCC): ICD-10-CM

## 2022-04-01 DIAGNOSIS — R68.89 OTHER GENERAL SYMPTOMS AND SIGNS: ICD-10-CM

## 2022-04-01 DIAGNOSIS — I12.9 BENIGN HYPERTENSION WITH CHRONIC KIDNEY DISEASE, STAGE III (HCC): ICD-10-CM

## 2022-04-01 PROCEDURE — G8755 DIAS BP > OR = 90: HCPCS | Performed by: INTERNAL MEDICINE

## 2022-04-01 PROCEDURE — G8427 DOCREV CUR MEDS BY ELIG CLIN: HCPCS | Performed by: INTERNAL MEDICINE

## 2022-04-01 PROCEDURE — 1101F PT FALLS ASSESS-DOCD LE1/YR: CPT | Performed by: INTERNAL MEDICINE

## 2022-04-01 PROCEDURE — G8510 SCR DEP NEG, NO PLAN REQD: HCPCS | Performed by: INTERNAL MEDICINE

## 2022-04-01 PROCEDURE — G8753 SYS BP > OR = 140: HCPCS | Performed by: INTERNAL MEDICINE

## 2022-04-01 PROCEDURE — 99214 OFFICE O/P EST MOD 30 MIN: CPT | Performed by: INTERNAL MEDICINE

## 2022-04-01 PROCEDURE — G8536 NO DOC ELDER MAL SCRN: HCPCS | Performed by: INTERNAL MEDICINE

## 2022-04-01 PROCEDURE — G8417 CALC BMI ABV UP PARAM F/U: HCPCS | Performed by: INTERNAL MEDICINE

## 2022-04-01 NOTE — PROGRESS NOTES
Identified pt with two pt identifiers(name and ). Reviewed record in preparation for visit and have obtained necessary documentation. Chief Complaint   Patient presents with    Leg Pain    Memory Loss        Vitals:    22 1034   BP: (!) 176/90   Pulse: 100   Resp: 18   Temp: 98.4 °F (36.9 °C)   TempSrc: Oral   SpO2: 95%   Weight: 201 lb (91.2 kg)   Height: 5' 8\" (1.727 m)   PainSc:   4   PainLoc: Leg       There are no preventive care reminders to display for this patient.     Current Outpatient Medications   Medication Instructions    acetaminophen (TYLENOL) 1,000 mg, Oral, DAILY AS NEEDED    amLODIPine (NORVASC) 10 mg tablet TAKE ONE TABLET ONCE DAILY  (HIGH BLOOD PRESSURE)    aspirin 81 mg, Oral, EVERY BEDTIME    atorvastatin (LIPITOR) 80 mg tablet TAKE ONE TABLET ONCE DAILY    carvediloL (COREG) 25 mg tablet TAKE 1 TABLET BY MOUTH TWICE DAILY WITH MEALS    cyanocobalamin 1,000 mcg, DAILY    donepeziL (ARICEPT) 5 mg tablet EVERY BEDTIME    ergocalciferol (ERGOCALCIFEROL) 50,000 Units, Oral, EVERY 7 DAYS    furosemide (LASIX) 20-40 mg, Oral, DAILY    hydroCHLOROthiazide (HYDRODIURIL) 12.5 mg tablet TAKE 1 TABLET BY MOUTH EVERY DAY       Allergies   Allergen Reactions    Percocet [Oxycodone-Acetaminophen] Other (comments)     \"feel weird\"       Immunization History   Administered Date(s) Administered    (RETIRED) Pneumococcal Vaccine (Unspecified Type) 2010    COVID-19, Pfizer Purple top, DILUTE for use, 12+ yrs, 30mcg/0.3mL dose 2020, 2021, 2021, 2021    Influenza High Dose Vaccine PF 2015, 2017    Influenza Vaccine Max-Wellness) PF (>6 Mo Flulaval, Fluarix, and >3 Yrs Afluria, Fluzone 62220) 2016    Influenza Vaccine (Tri) Adjuvanted (>65 Yrs FLUAD TRI 26477) 10/01/2018, 2019    Influenza, Quadrivalent, Adjuvanted (>65 Yrs FLUAD QUAD 06127) 2021    Pneumococcal Conjugate (PCV-13) 2015    Pneumococcal Polysaccharide (PPSV-23) 04/22/2010       Past Medical History:   Diagnosis Date    A-fib [facility])     Arthritis     CAD (coronary artery disease) 12/24/2013    Cataract     Diverticulosis 9/22/2009    Dyslipidemia 9/22/2009    Hemorrhoids 9/22/2009    HTN 9/22/2009    Hypercholesterolemia     Impotence 9/22/2009    MI (myocardial infarction) ([facility] [address] 12/2013    CABG X 3     Nausea & vomiting     Neuropathy 9/22/2009    Open angle glaucoma with borderline intraocular pressure     Subarachnoid hemorrhage ([facility] [address] 3/2/2020    Vitamin D deficiency 9/22/2009        Past Surgical History:   Procedure Laterality Date    ENDOSCOPY, COLON, DIAGNOSTIC  168449    HX CATARACT REMOVAL Bilateral 09/13/2017    HX CORONARY ARTERY BYPASS GRAFT  12/2013    triple    HX HERNIA REPAIR Bilateral     HX LITHOTRIPSY  319071    HX ORTHOPAEDIC      back surgery    SC CARDIAC SURG PROCEDURE UNLIST      CABG X 3       Social Determinants of Health     Tobacco Use: Low Risk     Smoking Tobacco Use: Never Smoker    Smokeless Tobacco Use: Never Used   Alcohol Use:     Frequency of Alcohol Consumption: Not on file    Average Number of Drinks: Not on file    Frequency of Binge Drinking: Not on file   Financial Resource Strain:     Difficulty of Paying Living Expenses: Not on file   Food Insecurity:     Worried About Running Out of Food in the Last Year: Not on file    Asim of Food in the Last Year: Not on file   Transportation Needs:     Lack of Transportation (Medical): Not on file    Lack of Transportation (Non-Medical):  Not on file   Physical Activity:     Days of Exercise per Week: Not on file    Minutes of Exercise per Session: Not on file   Stress:     Feeling of Stress : Not on file   Social Connections:     Frequency of Communication with Friends and Family: Not on file    Frequency of Social Gatherings with Friends and Family: Not on file    Attends Congregational Services: Not on file    Active Member of Clubs or Organizations: Not on file    Attends Club or Organization Meetings: Not on file    Marital Status: Not on file   Intimate Partner Violence:     Fear of Current or Ex-Partner: Not on file    Emotionally Abused: Not on file    Physically Abused: Not on file    Sexually Abused: Not on file   Depression: Not at risk    PHQ-2 Score: 1   Housing Stability:     Unable to Pay for Housing in the Last Year: Not on file    Number of [place] in the Last Year: Not on file    Unstable Housing in the Last Year: Not on file       Pt presents with labored gait on a walker for leg pain and memory loss, he reports Hx of stroke, he is unsure of his medications and when he took them. Memory Loss   The history is provided by the patient. This is a chronic problem. Episode onset: with stroke. His past medical history is significant for CVA, hypertension and dementia. Review of Systems   Psychiatric/Behavioral: Positive for memory loss. Physical Exam  Neurological:      Mental Status: He is alert. He is disoriented and confused. Gait: Gait abnormal.             Coordination of Care Questionnaire:  :   Patient is accompanied by self I have received verbal consent from Nikita Pavon to discuss any/all medical information while they are present in the room. 1. \"Have you been to the ER, urgent care clinic since your last visit? Hospitalized since your last visit? \" No    2. \"Have you seen or consulted any other health care providers outside of the [address] [place] [address] since your last visit? \" No     3. For patients over 45: Has the patient had a colonoscopy? Yes - no Care Gap present     If the patient is female:    4. For patients over 36: Has the patient had a mammogram? NA - based on age    11. For patients over 21: Has the patient had a pap smear?  NA - based on age

## 2022-04-01 NOTE — PROGRESS NOTES
HPI  Mr. Starla Pratt is a 79y.o. year old male, he is seen today for leg pain and memory concerns. He is not sure who made appt. Not sure what he's taking. Meant to bring med bottles and left them at home. Seen at [address] - had surgery on \"sciatic nerve\" - another surgery a year later, also s/p injections    Now b/l leg pain and buttock pain, no pain while sitting, only pain with walking  Feels unstable while walking  No back pain  No n/v/abd pain  No loss of bowel or bladder control  Has spinal stenosis based on MRI. Cardiologist - Dr. Richard Lara    Doesn't have neurologist    Wife has dementia, he thinks she may have called to make appt, doesn't think he has any memory issues. Says he does everything for himself and his wife. No HA or vision changes    Still driving    Cares for wife who has dementia  Chief Complaint   Patient presents with    Leg Pain    Memory Loss        Prior to Admission medications    Medication Sig Start Date End Date Taking? Authorizing Provider   carvediloL (COREG) 25 mg tablet TAKE 1 TABLET BY MOUTH TWICE DAILY WITH MEALS 3/4/22  Yes Evelina Tucker MD   furosemide (LASIX) 20 mg tablet Take 1-2 Tablets by mouth daily. 8/4/21  Yes Evelina Tucker MD   amLODIPine (NORVASC) 10 mg tablet TAKE ONE TABLET ONCE DAILY  (HIGH BLOOD PRESSURE) 1/29/21  Yes Evelina Tucker MD   acetaminophen (TYLENOL) 500 mg tablet Take 1,000 mg by mouth daily as needed for Pain. Yes Provider, Historical   ergocalciferol (ERGOCALCIFEROL) 1,250 mcg (50,000 unit) capsule Take 1 Capsule by mouth every seven (7) days. Patient not taking: Reported on 4/1/2022 6/21/21   Lizzie Oliveira PA-C   donepeziL (ARICEPT) 5 mg tablet Take  by mouth nightly. Patient not taking: Reported on 4/1/2022    Provider, Historical   cyanocobalamin 1,000 mcg tablet Take 1,000 mcg by mouth daily.   Patient not taking: Reported on 4/1/2022    Provider, Historical   hydroCHLOROthiazide (HYDRODIURIL) 12.5 mg tablet TAKE 1 TABLET BY MOUTH EVERY DAY 5/7/21   [place] January, BEN   atorvastatin (LIPITOR) 80 mg tablet TAKE ONE TABLET ONCE DAILY  Patient not taking: Reported on 4/1/2022 1/29/21   Jad Oliver MD   aspirin 81 mg chewable tablet Take 1 Tab by mouth nightly. Patient not taking: Reported on 4/1/2022 3/10/17   Eva Brooks MD         Allergies   Allergen Reactions    Percocet [Oxycodone-Acetaminophen] Other (comments)     \"feel weird\"         REVIEW OF SYSTEMS:  Per HPI    PHYSICAL EXAM:  Visit Vitals  BP (!) 142/90   Pulse 83   Temp 98.4 °F (36.9 °C) (Oral)   Resp 18   Ht 5' 8\" (1.727 m)   Wt 201 lb (91.2 kg)   SpO2 95%   BMI 30.56 kg/m²     Constitutional: Appears well-developed and well-nourished. No distress. HENT:   Head: Normocephalic and atraumatic. Eyes: No scleral icterus. Cardiovascular: Normal S1/S2, regular rhythm. No murmurs, rubs, or gallops. Pulmonary/Chest: Effort normal and breath sounds normal. No respiratory distress. No wheezes, rhonchi, or rales. Abdomen: Soft, NT/ND, +BS, no rebound or guarding, no masses, no HSM appreciated. Back: no pain on palpation spine  Ext: No edema. Neurological: Alert. Strength 5/5 b/l le  Psychiatric: Normal mood and affect. Behavior is normal.     Lab Results   Component Value Date/Time    Sodium 145 (H) 06/18/2021 11:53 AM    Potassium 4.1 06/18/2021 11:53 AM    Chloride 110 (H) 06/18/2021 11:53 AM    CO2 23 06/18/2021 11:53 AM    Anion gap 6 05/28/2019 07:58 AM    Glucose 128 (H) 06/18/2021 11:53 AM    BUN 19 06/18/2021 11:53 AM    Creatinine 1.09 06/18/2021 11:53 AM    BUN/Creatinine ratio 17 06/18/2021 11:53 AM    GFR est AA 73 06/18/2021 11:53 AM    GFR est non-AA 63 06/18/2021 11:53 AM    Calcium 8.9 06/18/2021 11:53 AM    Bilirubin, total 0.5 06/18/2021 11:53 AM    Alk.  phosphatase 85 06/18/2021 11:53 AM    Protein, total 6.8 06/18/2021 11:53 AM    Albumin 4.1 06/18/2021 11:53 AM    Globulin 3.7 05/28/2019 07:58 AM A-G Ratio 1.5 06/18/2021 11:53 AM    ALT (SGPT) 24 06/18/2021 11:53 AM     Lab Results   Component Value Date/Time    Hemoglobin A1c 6.1 (H) 06/18/2021 11:53 AM    Hemoglobin A1c 5.9 (H) 08/13/2018 01:35 PM    Hemoglobin A1c 5.6 09/18/2017 09:15 AM      Lab Results   Component Value Date/Time    Cholesterol, total 137 06/18/2021 11:53 AM    HDL Cholesterol 46 06/18/2021 11:53 AM    LDL, calculated 70 06/18/2021 11:53 AM    LDL, calculated 81 05/21/2019 10:49 AM    VLDL, calculated 21 06/18/2021 11:53 AM    VLDL, calculated 25 05/21/2019 10:49 AM    Triglyceride 118 06/18/2021 11:53 AM    CHOL/HDL Ratio 2.7 07/09/2010 09:43 AM          ASSESSMENT/PLAN  Diagnoses and all orders for this visit:    1. Spinal stenosis of lumbar region with neurogenic claudication  -     REFERRAL TO ORTHOPEDICS    2. CVA, old, hemiparesis ([facility] [address]    3. Benign hypertension with chronic kidney disease, stage III (HCC)  -     CBC WITH AUTOMATED DIFF; Future    4. Weakness of both legs    5. Other general symptoms and signs  -     VITAMIN B12; Future    6. Hyperlipidemia, mixed  -     METABOLIC PANEL, COMPREHENSIVE; Future  -     TSH 3RD GENERATION; Future  -     LIPID PANEL; Future      bp high - but not sure what he is taking - will bring meds to next visit later this month  Left leg weakness stable since CVA  Will refer to ortho for #1  Check labs as above, will try to find out who made appt and what concerns are - patient denies any memory loss    There are no preventive care reminders to display for this patient. Follow-up and Dispositions    · Return for keep appointment scheduled. Reviewed plan of care. Patient has provided input and agrees with goals. The nurse provided the patient and/or family with advanced directive information if needed and encouraged the patient to provide a copy to the office when available.

## 2022-05-20 ENCOUNTER — OFFICE VISIT (OUTPATIENT)
Dept: ORTHOPEDIC SURGERY | Age: 82
End: 2022-05-20
Payer: MEDICARE

## 2022-05-20 VITALS — BODY MASS INDEX: 30.92 KG/M2 | WEIGHT: 197 LBS | HEIGHT: 67 IN

## 2022-05-20 DIAGNOSIS — M51.36 DDD (DEGENERATIVE DISC DISEASE), LUMBAR: ICD-10-CM

## 2022-05-20 DIAGNOSIS — M54.50 CHRONIC BILATERAL LOW BACK PAIN, UNSPECIFIED WHETHER SCIATICA PRESENT: ICD-10-CM

## 2022-05-20 DIAGNOSIS — Z98.890 HISTORY OF LUMBAR LAMINECTOMY: Primary | ICD-10-CM

## 2022-05-20 DIAGNOSIS — M48.062 LUMBAR STENOSIS WITH NEUROGENIC CLAUDICATION: ICD-10-CM

## 2022-05-20 DIAGNOSIS — G89.29 CHRONIC BILATERAL LOW BACK PAIN, UNSPECIFIED WHETHER SCIATICA PRESENT: ICD-10-CM

## 2022-05-20 PROCEDURE — 1101F PT FALLS ASSESS-DOCD LE1/YR: CPT | Performed by: STUDENT IN AN ORGANIZED HEALTH CARE EDUCATION/TRAINING PROGRAM

## 2022-05-20 PROCEDURE — G8432 DEP SCR NOT DOC, RNG: HCPCS | Performed by: STUDENT IN AN ORGANIZED HEALTH CARE EDUCATION/TRAINING PROGRAM

## 2022-05-20 PROCEDURE — G8536 NO DOC ELDER MAL SCRN: HCPCS | Performed by: STUDENT IN AN ORGANIZED HEALTH CARE EDUCATION/TRAINING PROGRAM

## 2022-05-20 PROCEDURE — G8756 NO BP MEASURE DOC: HCPCS | Performed by: STUDENT IN AN ORGANIZED HEALTH CARE EDUCATION/TRAINING PROGRAM

## 2022-05-20 PROCEDURE — G8427 DOCREV CUR MEDS BY ELIG CLIN: HCPCS | Performed by: STUDENT IN AN ORGANIZED HEALTH CARE EDUCATION/TRAINING PROGRAM

## 2022-05-20 PROCEDURE — 99214 OFFICE O/P EST MOD 30 MIN: CPT | Performed by: STUDENT IN AN ORGANIZED HEALTH CARE EDUCATION/TRAINING PROGRAM

## 2022-05-20 PROCEDURE — G8417 CALC BMI ABV UP PARAM F/U: HCPCS | Performed by: STUDENT IN AN ORGANIZED HEALTH CARE EDUCATION/TRAINING PROGRAM

## 2022-05-20 RX ORDER — GABAPENTIN 100 MG/1
100-300 CAPSULE ORAL
Qty: 30 CAPSULE | Refills: 0 | Status: SHIPPED | OUTPATIENT
Start: 2022-05-20 | End: 2022-06-14

## 2022-05-20 RX ORDER — DICLOFENAC SODIUM 75 MG/1
75 TABLET, DELAYED RELEASE ORAL 2 TIMES DAILY
Qty: 60 TABLET | Refills: 0 | Status: SHIPPED | OUTPATIENT
Start: 2022-05-20

## 2022-05-20 NOTE — PROGRESS NOTES
Nury Gonzalez Sr (: 1940) is a 79 y.o. male, patient, here for evaluation of the following chief complaint(s):  No chief complaint on file. ASSESSMENT/PLAN:  Below is the assessment and plan developed based on review of pertinent history, physical exam, labs, studies, and medications. Patient presents today for follow-up of his bilateral low back pain with lower extremity radiculopathy as detailed below. Some weakness noted on physical exam.  He has signs of significant neurogenic claudication. Prior MRI from 2019 reviewed today with the patient. I would like for him to get an updated MRI to evaluate for any progression of compressive pathology. Additionally, gabapentin and diclofenac prescribed today. Extensive discussion of treatment options with the patient. He was begin with a very low dose of gabapentin and increase as tolerated. Radiologic findings reviewed in detail with the patient. He has tried and failed extensive conservative management over the years, and was previously signed up for surgery in 2019 but had to cancel the surgery due to being the sole caregiver for his wife and mother-in-law. He will follow-up to discuss the results of his MRI. 1. History of lumbar laminectomy  -     XR SPINE LUMB MIN 4 V; Future  -     MRI LUMB SPINE WO CONT; Future  2. Chronic bilateral low back pain, unspecified whether sciatica present  -     XR SPINE LUMB MIN 4 V; Future  -     MRI LUMB SPINE WO CONT; Future  -     gabapentin (NEURONTIN) 100 mg capsule; Take 1-3 Capsules by mouth nightly. Max Daily Amount: 300 mg., Normal, Disp-30 Capsule, R-0  3. DDD (degenerative disc disease), lumbar  4. Lumbar stenosis with neurogenic claudication      No follow-ups on file. SUBJECTIVE/OBJECTIVE:  Nury Gonzalez Sr (: 1940) is a 79 y.o. male. No flowsheet data found.      Patient presents today for reevaluation of bilateral low back pain and radiation of pain into the posterior aspect of the lower extremities. He previously had a an L4-5 lumbar laminectomy performed by Dr. Denisa Bull in 2016 and subsequent revision L3-5 lumbar laminectomy done in 2018. He reports he was signed up for surgery with Dr. Denisa Bull in 2019, but unfortunately had to cancel. He is a caregiver for his wife with dementia as well as his mother-in-law. Additionally, he had a stroke between 2019 and notable, but states he is not sure exactly when that was. Today he reports increasing bilateral low back pain radiating down the posterior aspect of bilateral lower extremities. He states when he is up and walking his legs are very tired, that he can barely make it 1 city block before having to sit down. His symptoms are significantly improved with sitting down. He reports intermittent bilateral lower extremity weakness. He states the symptoms have progressively been worsening over the last 2 years. He does not take anything for pain at this point. Denies any acute changes in bowel or bladder control. He ambulates using a walker, and states that he is nervous about tripping over his right foot. Imaging:    XR Results (most recent):  Results from Appointment encounter on 05/20/22    XR SPINE LUMB MIN 4 V    Narrative  AP, lateral, flexion-extension views of the lumbar spine reveal multilevel spondylosis and disc degeneration, worse at L3-4. No evidence of acute fractures, lytic lesions or gross instability noted. Allergies   Allergen Reactions    Percocet [Oxycodone-Acetaminophen] Other (comments)     \"feel weird\"       Current Outpatient Medications   Medication Sig    diclofenac EC (VOLTAREN) 75 mg EC tablet Take 1 Tablet by mouth two (2) times a day.  gabapentin (NEURONTIN) 100 mg capsule Take 1-3 Capsules by mouth nightly. Max Daily Amount: 300 mg.  carvediloL (COREG) 25 mg tablet TAKE 1 TABLET BY MOUTH TWICE DAILY WITH MEALS    furosemide (LASIX) 20 mg tablet Take 1-2 Tablets by mouth daily.     ergocalciferol (ERGOCALCIFEROL) 1,250 mcg (50,000 unit) capsule Take 1 Capsule by mouth every seven (7) days.  donepeziL (ARICEPT) 5 mg tablet Take  by mouth nightly.  cyanocobalamin 1,000 mcg tablet Take 1,000 mcg by mouth daily.  hydroCHLOROthiazide (HYDRODIURIL) 12.5 mg tablet TAKE 1 TABLET BY MOUTH EVERY DAY    atorvastatin (LIPITOR) 80 mg tablet TAKE ONE TABLET ONCE DAILY    amLODIPine (NORVASC) 10 mg tablet TAKE ONE TABLET ONCE DAILY  (HIGH BLOOD PRESSURE)    aspirin 81 mg chewable tablet Take 1 Tab by mouth nightly.  acetaminophen (TYLENOL) 500 mg tablet Take 1,000 mg by mouth daily as needed for Pain. (Patient not taking: Reported on 5/20/2022)     No current facility-administered medications for this visit.        Past Medical History:   Diagnosis Date    A-fib [facility])     Arthritis     CAD (coronary artery disease) 12/24/2013    Cataract     Diverticulosis 9/22/2009    Dyslipidemia 9/22/2009    Hemorrhoids 9/22/2009    HTN 9/22/2009    Hypercholesterolemia     Impotence 9/22/2009    MI (myocardial infarction) ([address] 12/2013    CABG X 3     Nausea & vomiting     Neuropathy 9/22/2009    Open angle glaucoma with borderline intraocular pressure     Subarachnoid hemorrhage ([address] 3/2/2020    Vitamin D deficiency 9/22/2009        Past Surgical History:   Procedure Laterality Date    ENDOSCOPY, COLON, DIAGNOSTIC  960841    HX CATARACT REMOVAL Bilateral 09/13/2017    HX CORONARY ARTERY BYPASS GRAFT  12/2013    triple    HX HERNIA REPAIR Bilateral     HX LITHOTRIPSY  200    HX ORTHOPAEDIC      back surgery    [place] CARDIAC SURG PROCEDURE UNLIST      CABG X 3       Family History   Problem Relation Age of Onset    Hypertension Mother     Diabetes Mother     Cancer Father         LIVER    Alcohol abuse Brother     Diabetes Sister     Hypertension Sister     OSTEOARTHRITIS Sister     Kidney Disease Sister         DIALYSIS    Alcohol abuse Brother     Suicide Brother     No Known Problems Brother     No Known Problems Brother     Dementia Brother     Hypertension Son    [facility] Hypertension Son     Anesth Problems Neg Hx         Social History     Tobacco Use    Smoking status: Never Smoker    Smokeless tobacco: Never Used   Vaping Use    Vaping Use: Never used   Substance Use Topics    Alcohol use: No    Drug use: No        Review of Systems   Musculoskeletal: Positive for arthralgias, back pain, gait problem and myalgias. Neurological: Positive for weakness. All other systems reviewed and are negative. Vitals:  Ht 5' 7\" (1.702 m)   Wt 197 lb (89.4 kg)   BMI 30.85 kg/m²    Body mass index is 30.85 kg/m². Physical Exam  Neurologic  Sensory  Light Touch - Intact - Globally. Overall Assessment of Muscle Strength and Tone reveals  Lower Extremities - Right Iliopsoas - 5/5. Left Iliopsoas - 4/5. Right Tibialis Anterior - 5/5. Left Tibialis Anterior - 5/5. Right Gastroc-Soleus - 5/5. Left Gastroc-Soleus - 5/5. Right EHL - 5/5. Left EHL - 5/5. General Assessment of Reflexes  Right Ankle - Clonus is not present. Left Ankle - Clonus is not present. Reflexes (Dermatomes)  2/2 Normal - Left Achilles (L5-S2), Left Knee (L2-4), Right Achilles (L5-S2) and Right Knee (L2-4). Musculoskeletal  Global Assessment  Examination of related systems reveals - well-developed, well-nourished, in no acute distress, alert and oriented x 3. Gait and Station -ambulates using a Rollator walker with forward weightbearing line and shuffling gait. . Right Lower Extremity - normal strength and tone, normal range of motion without pain and no instability, subluxation or laxity. Left Lower Extremity - normal strength and tone, normal range of motion without pain and no instability, subluxation or laxity.   Spine/Ribs/Pelvis  Cervical Spine - Examination of the cervical spine reveals - no tenderness to palpation, no pain, no swelling, edema or erythema, normal cervical spine movements and normal sensation. Thoracic (Dorsal) Spine - Examination of the thoracic spine reveals - no tenderness over thoracic vertebrae, no pain, normal sensation and normal thoracic spine movements. Lumbosacral Spine - Examination of the lumbosacral spine reveals - no known fractures or deformities. Inspection and Palpation - Tenderness - moderate. Assessment of pain reveals the following findings - The pain is characterized as - moderate. Location - pain refers to lower back bilaterally. ROJM - Trunk Extension - 15 degrees. Lumbar Spine Flexion - 35 °. Lumbosacral Spine - Functional Testing - Babinski Test negative, Prone Knee Bending Test negative, Slump Test negative, Straight Leg Raising Test negative. Dr. Carolyn Gutierrez was available for immediate consult during this encounter. An electronic signature was used to authenticate this note.   -- NEGRA Peterson

## 2022-05-20 NOTE — LETTER
5/20/2022    Patient: Caio Linder   YOB: 1940   Date of Visit: 5/20/2022     Amanda Longoria MD  [place] [id number]  [address] [place]    Dear Amanda Longoria MD,      Thank you for referring Mr. Miesha Becerra to [facility] for evaluation. My notes for this consultation are attached. If you have questions, please do not hesitate to call me. I look forward to following your patient along with you.       Sincerely,    NEGRA Thurman

## 2022-06-14 DIAGNOSIS — G89.29 CHRONIC BILATERAL LOW BACK PAIN, UNSPECIFIED WHETHER SCIATICA PRESENT: ICD-10-CM

## 2022-06-14 DIAGNOSIS — M54.50 CHRONIC BILATERAL LOW BACK PAIN, UNSPECIFIED WHETHER SCIATICA PRESENT: ICD-10-CM

## 2022-06-14 RX ORDER — GABAPENTIN 100 MG/1
CAPSULE ORAL
Qty: 30 CAPSULE | Refills: 1 | Status: SHIPPED | OUTPATIENT
Start: 2022-06-14 | End: 2022-07-12 | Stop reason: SDUPTHER

## 2022-07-11 ENCOUNTER — TELEPHONE (OUTPATIENT)
Dept: INTERNAL MEDICINE CLINIC | Age: 82
End: 2022-07-11

## 2022-07-11 NOTE — TELEPHONE ENCOUNTER
Pt wife called and stated that the pt is having a lot of memory problems and she is extremely worried about him. She wanted him seen asap due to this and he is not taking his medications.  She would like someone to call and discuss

## 2022-07-11 NOTE — TELEPHONE ENCOUNTER
I called the patient and spoke with Jacqulynn Gosselin (not on HIP[employer]). She verified the patient by name and date of birth. He claimed to have got his medications but needed to contact us. She thought the pharmacy normally calls us so she decided to call us. He is getting weak in the right leg. Wanted to know if we could see him this week. He had an appointment today with cardio but she knew nothing about it and patient did not go. She cannot do anything with him. I scheduled the patient to see Kendall Ng tomorrow at 6. She stated understanding and confirmed the appointment.

## 2022-07-12 ENCOUNTER — OFFICE VISIT (OUTPATIENT)
Dept: INTERNAL MEDICINE CLINIC | Age: 82
End: 2022-07-12
Payer: MEDICARE

## 2022-07-12 VITALS
DIASTOLIC BLOOD PRESSURE: 104 MMHG | BODY MASS INDEX: 30.76 KG/M2 | HEIGHT: 67 IN | RESPIRATION RATE: 16 BRPM | HEART RATE: 77 BPM | OXYGEN SATURATION: 96 % | SYSTOLIC BLOOD PRESSURE: 198 MMHG | TEMPERATURE: 98.4 F | WEIGHT: 196 LBS

## 2022-07-12 DIAGNOSIS — N18.30 BENIGN HYPERTENSION WITH CHRONIC KIDNEY DISEASE, STAGE III (HCC): ICD-10-CM

## 2022-07-12 DIAGNOSIS — R60.0 LOCALIZED EDEMA: ICD-10-CM

## 2022-07-12 DIAGNOSIS — M54.50 CHRONIC BILATERAL LOW BACK PAIN, UNSPECIFIED WHETHER SCIATICA PRESENT: ICD-10-CM

## 2022-07-12 DIAGNOSIS — G89.29 CHRONIC BILATERAL LOW BACK PAIN, UNSPECIFIED WHETHER SCIATICA PRESENT: ICD-10-CM

## 2022-07-12 DIAGNOSIS — I12.9 BENIGN HYPERTENSION WITH CHRONIC KIDNEY DISEASE, STAGE III (HCC): ICD-10-CM

## 2022-07-12 DIAGNOSIS — E78.2 HYPERLIPIDEMIA, MIXED: ICD-10-CM

## 2022-07-12 PROCEDURE — G8427 DOCREV CUR MEDS BY ELIG CLIN: HCPCS | Performed by: PHYSICIAN ASSISTANT

## 2022-07-12 PROCEDURE — G8417 CALC BMI ABV UP PARAM F/U: HCPCS | Performed by: PHYSICIAN ASSISTANT

## 2022-07-12 PROCEDURE — 1123F ACP DISCUSS/DSCN MKR DOCD: CPT | Performed by: PHYSICIAN ASSISTANT

## 2022-07-12 PROCEDURE — G8432 DEP SCR NOT DOC, RNG: HCPCS | Performed by: PHYSICIAN ASSISTANT

## 2022-07-12 PROCEDURE — G8753 SYS BP > OR = 140: HCPCS | Performed by: PHYSICIAN ASSISTANT

## 2022-07-12 PROCEDURE — 99214 OFFICE O/P EST MOD 30 MIN: CPT | Performed by: PHYSICIAN ASSISTANT

## 2022-07-12 PROCEDURE — 1101F PT FALLS ASSESS-DOCD LE1/YR: CPT | Performed by: PHYSICIAN ASSISTANT

## 2022-07-12 PROCEDURE — G8536 NO DOC ELDER MAL SCRN: HCPCS | Performed by: PHYSICIAN ASSISTANT

## 2022-07-12 PROCEDURE — G8755 DIAS BP > OR = 90: HCPCS | Performed by: PHYSICIAN ASSISTANT

## 2022-07-12 RX ORDER — GABAPENTIN 100 MG/1
CAPSULE ORAL
Qty: 90 CAPSULE | Refills: 1 | Status: SHIPPED | OUTPATIENT
Start: 2022-07-12

## 2022-07-12 RX ORDER — HYDROCHLOROTHIAZIDE 12.5 MG/1
TABLET ORAL
Qty: 90 TABLET | Refills: 3 | Status: SHIPPED | OUTPATIENT
Start: 2022-07-12

## 2022-07-12 RX ORDER — DONEPEZIL HYDROCHLORIDE 5 MG/1
5 TABLET, FILM COATED ORAL
Qty: 90 TABLET | Refills: 3 | Status: SHIPPED | OUTPATIENT
Start: 2022-07-12

## 2022-07-12 RX ORDER — ATORVASTATIN CALCIUM 80 MG/1
TABLET, FILM COATED ORAL
Qty: 90 TABLET | Refills: 3 | Status: SHIPPED | OUTPATIENT
Start: 2022-07-12

## 2022-07-12 RX ORDER — FUROSEMIDE 20 MG/1
20-40 TABLET ORAL DAILY
Qty: 30 TABLET | Refills: 1 | Status: SHIPPED | OUTPATIENT
Start: 2022-07-12

## 2022-07-12 RX ORDER — CARVEDILOL 25 MG/1
25 TABLET ORAL 2 TIMES DAILY WITH MEALS
Qty: 180 TABLET | Refills: 1 | Status: SHIPPED | OUTPATIENT
Start: 2022-07-12

## 2022-07-12 RX ORDER — AMLODIPINE BESYLATE 10 MG/1
TABLET ORAL
Qty: 90 TABLET | Refills: 3 | Status: SHIPPED | OUTPATIENT
Start: 2022-07-12

## 2022-07-12 NOTE — PROGRESS NOTES
Wilver Vicente is a 79y.o. year old male seen in clinic today for   Chief Complaint   Patient presents with    Leg Pain     weakness    Medication Refill       he is here today to follow up for low back pain and med refills. Hypertension: Controlled with hctz 12.5, amlodipine 10 mg, carvedilol 25 mg bid  Compliant with medications? Has been out of medicine for 4 days  Compliant with diet? Mostly   Compliant with exercise? Back pain limiting  Average blood pressure readings outside of office. n/a    Denies any HA, dizziness, CP, SOB, edema, visual changes    Reporting BL leg pain radiating like sciatica from low back. Hx of low back problems and was told in the past he may need back surgery. Has had GARY in the past done with some relief. Has had multiple falls but none overly traumatic. Notes no numbness or tingling. Hx of CVA as well, gait difficulty which leads him to use a walker. Has gabapentin for pain at home, ran out. Saw Orthopedic spine in May and had MRI scheduled, but did not complete scheduling. Current Outpatient Medications on File Prior to Visit   Medication Sig Dispense Refill    diclofenac EC (VOLTAREN) 75 mg EC tablet Take 1 Tablet by mouth two (2) times a day. 60 Tablet 0    acetaminophen (TYLENOL) 500 mg tablet Take 1,000 mg by mouth daily as needed for Pain.  [DISCONTINUED] gabapentin (NEURONTIN) 100 mg capsule TAKE 1 TO 3 CAPSULES BY MOUTH EVERY NIGHT. MAX DAILY AMOUNT: 300 MG 30 Capsule 1    [DISCONTINUED] carvediloL (COREG) 25 mg tablet TAKE 1 TABLET BY MOUTH TWICE DAILY WITH MEALS 60 Tablet 11    [DISCONTINUED] furosemide (LASIX) 20 mg tablet Take 1-2 Tablets by mouth daily. 30 Tablet 1    [DISCONTINUED] ergocalciferol (ERGOCALCIFEROL) 1,250 mcg (50,000 unit) capsule Take 1 Capsule by mouth every seven (7) days. (Patient not taking: Reported on 7/12/2022) 12 Capsule 1    [DISCONTINUED] donepeziL (ARICEPT) 5 mg tablet Take  by mouth nightly.       [DISCONTINUED] cyanocobalamin 1,000 mcg tablet Take 1,000 mcg by mouth daily. (Patient not taking: Reported on 7/12/2022)      [DISCONTINUED] hydroCHLOROthiazide (HYDRODIURIL) 12.5 mg tablet TAKE 1 TABLET BY MOUTH EVERY DAY 90 Tab 3    [DISCONTINUED] atorvastatin (LIPITOR) 80 mg tablet TAKE ONE TABLET ONCE DAILY 30 Tab 11    [DISCONTINUED] amLODIPine (NORVASC) 10 mg tablet TAKE ONE TABLET ONCE DAILY  (HIGH BLOOD PRESSURE) 30 Tab 11    [DISCONTINUED] aspirin 81 mg chewable tablet Take 1 Tab by mouth nightly. (Patient not taking: Reported on 7/12/2022) 90 Tab 3     No current facility-administered medications on file prior to visit.          Allergies   Allergen Reactions    Percocet [Oxycodone-Acetaminophen] Other (comments)     \"feel weird\"     Past Medical History:   Diagnosis Date    [facility]     Arthritis     CAD (coronary artery disease) 12/24/2013    Cataract     Diverticulosis 9/22/2009    Dyslipidemia 9/22/2009    Hemorrhoids 9/22/2009    HTN 9/22/2009    Hypercholesterolemia     Impotence 9/22/2009    MI (myocardial infarction) ([facility] 12/2013    CABG X 3     Nausea & vomiting     Neuropathy 9/22/2009    Open angle glaucoma with borderline intraocular pressure     Subarachnoid hemorrhage ([facility] 3/2/2020    Vitamin D deficiency 9/22/2009      Past Surgical History:   Procedure Laterality Date    ENDOSCOPY, COLON, DIAGNOSTIC  [id number]    HX CATARACT REMOVAL Bilateral 09/13/2017    HX CORONARY ARTERY BYPASS GRAFT  12/2013    triple    HX HERNIA REPAIR Bilateral     HX LITHOTRIPSY  200    HX ORTHOPAEDIC      back surgery    [place] CARDIAC SURG PROCEDURE UNLIST      CABG X 3        Family History   Problem Relation Age of Onset    Hypertension Mother     Diabetes Mother     Cancer Father         LIVER    Alcohol abuse Brother     Diabetes Sister     Hypertension Sister     OSTEOARTHRITIS Sister     Kidney Disease Sister         DIALYSIS    Alcohol abuse Brother     Suicide Brother     No Known Problems Brother     No Known Problems Brother     Dementia Brother     Hypertension Son    [place] Hypertension Son     Anesth Problems Neg Hx         Social History     Socioeconomic History    Marital status:      Spouse name: Not on file    Number of children: Not on file    Years of education: Not on file    Highest education level: Not on file   Occupational History    Not on file   Tobacco Use    Smoking status: Never Smoker    Smokeless tobacco: Never Used   Vaping Use    Vaping Use: Never used   Substance and Sexual Activity    Alcohol use: No    Drug use: No    Sexual activity: Yes     Partners: Female   Other Topics Concern    Not on file   Social History Narrative    Not on file     Social Determinants of Health     Financial Resource Strain:     Difficulty of Paying Living Expenses: Not on file   Food Insecurity:     Worried About Running Out of Food in the Last Year: Not on file    Asim of Food in the Last Year: Not on file   Transportation Needs:     Lack of Transportation (Medical): Not on file    Lack of Transportation (Non-Medical):  Not on file   Physical Activity:     Days of Exercise per Week: Not on file    Minutes of Exercise per Session: Not on file   Stress:     Feeling of Stress : Not on file   Social Connections:     Frequency of Communication with Friends and Family: Not on file    Frequency of Social Gatherings with Friends and Family: Not on file    Attends Orthodoxy Services: Not on file    Active Member of Clubs or Organizations: Not on file    Attends Club or Organization Meetings: Not on file    Marital Status: Not on file   Intimate Partner Violence:     Fear of Current or Ex-Partner: Not on file    Emotionally Abused: Not on file    Physically Abused: Not on file    Sexually Abused: Not on file   Housing Stability:     Unable to Pay for Housing in the Last Year: Not on file    Number of Places Lived in the Last Year: Not on file    Unstable Housing in the Last Year: Not on file           Visit Vitals  BP (!) 198/104 (BP 1 Location: Left arm, BP Patient Position: Sitting)   Pulse 77   Temp 98.4 °F (36.9 °C) (Oral)   Resp 16   Ht 5' 7\" (1.702 m)   Wt 196 lb (88.9 kg)   SpO2 96%   BMI 30.70 kg/m²       Review of Systems   Constitutional: Negative for chills, fever, malaise/fatigue and weight loss. Respiratory: Negative for cough, shortness of breath and wheezing. Cardiovascular: Negative for chest pain, palpitations and leg swelling. Gastrointestinal: Negative for abdominal pain, blood in stool, constipation, diarrhea, heartburn, melena, nausea and vomiting. Genitourinary: Negative for dysuria and frequency. Musculoskeletal: Positive for back pain and myalgias. Skin: Negative for rash. Neurological: Negative for dizziness, weakness and headaches. Endo/Heme/Allergies: Does not bruise/bleed easily. Psychiatric/Behavioral: Negative for depression. All other systems reviewed and are negative. Physical Exam  Vitals and nursing note reviewed. Constitutional:       Appearance: Normal appearance. Comments: Walking with walker   HENT:      Head: Normocephalic and atraumatic. Right Ear: External ear normal.      Left Ear: External ear normal.      Nose: Nose normal.      Mouth/Throat:      Mouth: Mucous membranes are moist.      Pharynx: Oropharynx is clear. No oropharyngeal exudate or posterior oropharyngeal erythema. Eyes:      Conjunctiva/sclera: Conjunctivae normal.      Pupils: Pupils are equal, round, and reactive to light. Neck:      Vascular: No carotid bruit. Cardiovascular:      Rate and Rhythm: Normal rate and regular rhythm. Pulses: Normal pulses. Heart sounds: Normal heart sounds. No murmur heard. Pulmonary:      Effort: Pulmonary effort is normal.      Breath sounds: Normal breath sounds. No stridor. No wheezing, rhonchi or rales. Abdominal:      General: Abdomen is flat.  Bowel sounds are normal. There is no distension. Tenderness: There is no abdominal tenderness. There is no guarding. Musculoskeletal:         General: Normal range of motion. Cervical back: Normal range of motion and neck supple. No rigidity. Right lower leg: No edema. Left lower leg: Edema present. Skin:     General: Skin is dry. Capillary Refill: Capillary refill takes less than 2 seconds. Neurological:      General: No focal deficit present. Mental Status: He is alert and oriented to person, place, and time. Mental status is at baseline. Psychiatric:         Mood and Affect: Mood normal.          No results found for this or any previous visit (from the past 24 hour(s)). ASSESSMENT AND PLAN  Diagnoses and all orders for this visit:    1. Localized edema  -     furosemide (LASIX) 20 mg tablet; Take 1-2 Tablets by mouth daily. 2. Chronic bilateral low back pain, unspecified whether sciatica present  -     gabapentin (NEURONTIN) 100 mg capsule; TAKE 1 TO 3 CAPSULES BY MOUTH EVERY NIGHT. MAX DAILY AMOUNT: 300 MG    3. Benign hypertension with chronic kidney disease, stage III (HCC)  -     carvediloL (COREG) 25 mg tablet; Take 1 Tablet by mouth two (2) times daily (with meals). -     amLODIPine (NORVASC) 10 mg tablet; TAKE ONE TABLET ONCE DAILY  (HIGH BLOOD PRESSURE)    4. Hyperlipidemia, mixed  -     atorvastatin (LIPITOR) 80 mg tablet; TAKE ONE TABLET ONCE DAILY    Other orders  -     hydroCHLOROthiazide (HYDRODIURIL) 12.5 mg tablet; TAKE 1 TABLET BY MOUTH EVERY DAY  -     donepeziL (ARICEPT) 5 mg tablet; Take 1 Tablet by mouth nightly. Refills placed for chronic meds from PCP. Had labs done in spring time. Needs to follow back up for BP check. Hx of CVA and low back problems. Needs to have MRI done so he can proceed with GARY and pain management or orthopedic treatment    HTN urgency: asymptomatic.  Repeat /94       I have discussed the diagnosis with the patient and the intended plan as seen in the above orders. Patient is in agreement. The patient has received an after-visit summary and questions were answered concerning future plans. I have discussed medication side effects and warnings with the patient as well.     Gigi Joy PA-C

## 2022-07-12 NOTE — PROGRESS NOTES
Chief Complaint   Patient presents with    Leg Pain     weakness    Medication Refill     Bilateral leg weakness. Pt states he has been out of some of his medications for 1 week. 1. \"Have you been to the ER, urgent care clinic since your last visit? Hospitalized since your last visit? \" No    2. \"Have you seen or consulted any other health care providers outside of the [address] [place] [address] since your last visit? \" No     3. For patients aged 39-70: Has the patient had a colonoscopy / FIT/ Cologuard? Yes - no Care Gap present      If the patient is female:    4. For patients aged 41-77: Has the patient had a mammogram within the past 2 years? NA - based on age or sex      11. For patients aged 21-65: Has the patient had a pap smear?  NA - based on age or sex      Visit Vitals  BP (!) 198/104 (BP 1 Location: Left arm, BP Patient Position: Sitting)   Pulse 77   Temp 98.4 °F (36.9 °C) (Oral)   Resp 16   Ht 5' 7\" (1.702 m)   Wt 196 lb (88.9 kg)   SpO2 96%   BMI 30.70 kg/m²

## 2022-07-28 ENCOUNTER — HOSPITAL ENCOUNTER (OUTPATIENT)
Dept: MRI IMAGING | Age: 82
Discharge: HOME | End: 2022-07-28
Attending: STUDENT IN AN ORGANIZED HEALTH CARE EDUCATION/TRAINING PROGRAM
Payer: MEDICARE

## 2022-07-28 DIAGNOSIS — G89.29 CHRONIC BILATERAL LOW BACK PAIN, UNSPECIFIED WHETHER SCIATICA PRESENT: ICD-10-CM

## 2022-07-28 DIAGNOSIS — Z98.890 HISTORY OF LUMBAR LAMINECTOMY: ICD-10-CM

## 2022-07-28 DIAGNOSIS — M54.50 CHRONIC BILATERAL LOW BACK PAIN, UNSPECIFIED WHETHER SCIATICA PRESENT: ICD-10-CM

## 2022-07-28 PROCEDURE — 72148 MRI LUMBAR SPINE W/O DYE: CPT

## 2022-08-05 ENCOUNTER — TELEPHONE (OUTPATIENT)
Dept: ORTHOPEDIC SURGERY | Age: 82
End: 2022-08-05

## 2022-08-05 ENCOUNTER — TELEPHONE (OUTPATIENT)
Dept: INTERNAL MEDICINE CLINIC | Age: 82
End: 2022-08-05

## 2022-08-08 NOTE — TELEPHONE ENCOUNTER
I tried to call Allison Garcia but the phone went straight to voicemail & the box full. We did not order MRI & results will have to come from ordering provider.

## 2022-08-12 ENCOUNTER — TELEPHONE (OUTPATIENT)
Dept: INTERNAL MEDICINE CLINIC | Age: 82
End: 2022-08-12

## 2022-08-12 NOTE — TELEPHONE ENCOUNTER
----- Message from Mateo Simms sent at 8/12/2022  2:05 PM EDT -----  Subject: Message to Provider    QUESTIONS  Information for Provider? Patient is scheduled for a appointment on   09/20/2022 and is wanting to come in earlier for an appointment. Patient   said any time or day sooner will work, and to just leave a voicemail.   ---------------------------------------------------------------------------  --------------  Lissett ARAUZ  [phone]; OK to leave message on voicemail  ---------------------------------------------------------------------------  --------------  SCRIPT ANSWERS  Relationship to Patient?  Self

## 2022-09-01 ENCOUNTER — TELEPHONE (OUTPATIENT)
Dept: INTERNAL MEDICINE CLINIC | Age: 82
End: 2022-09-01

## 2022-09-01 NOTE — TELEPHONE ENCOUNTER
----- Message from Fitz sent at 9/1/2022 12:39 PM EDT -----  Subject: Message to Provider    QUESTIONS  Information for Provider? Sending a fax for a script that has been placed   for orthopedic braces.  if possible kindly send to us with a signature and   patient notes [phone]  ---------------------------------------------------------------------------  --------------  [id number] [facility] [address] [facility]  [phone]; OK to leave message on voicemail  ---------------------------------------------------------------------------  --------------  SCRIPT ANSWERS  undefined

## 2022-10-12 ENCOUNTER — TELEPHONE (OUTPATIENT)
Dept: INTERNAL MEDICINE CLINIC | Age: 82
End: 2022-10-12

## 2022-10-12 NOTE — TELEPHONE ENCOUNTER
I called to get patient rescheduled from when Dr. Leny Zaman was off. Patient says he has found another primary care provider closer to his home.

## 2023-01-03 RX ORDER — DICLOFENAC SODIUM 75 MG/1
TABLET, DELAYED RELEASE ORAL
Qty: 60 TABLET | Refills: 0 | OUTPATIENT
Start: 2023-01-03

## 2023-02-21 DIAGNOSIS — M54.50 CHRONIC BILATERAL LOW BACK PAIN, UNSPECIFIED WHETHER SCIATICA PRESENT: ICD-10-CM

## 2023-02-21 DIAGNOSIS — G89.29 CHRONIC BILATERAL LOW BACK PAIN, UNSPECIFIED WHETHER SCIATICA PRESENT: ICD-10-CM

## 2023-02-21 RX ORDER — GABAPENTIN 100 MG/1
CAPSULE ORAL
Qty: 90 CAPSULE | Refills: 1 | Status: CANCELLED | OUTPATIENT
Start: 2023-02-21

## 2025-07-30 NOTE — PROGRESS NOTES
HPI  Mr. Ricardo Dennis is a 79y.o. year old male, he is seen today for AWV, dysphagia. Still works in yard, active. No chest pain or sob. Sees cardiologist next week. Chronic edema in b/l le, better than in past. Resolved in morning, gets worse as day goes on. No PND, or orthopnea. Says he will choke if eating quickly and not chewing well. This has been happening for years since CVA and not changed. Weight is down 14# since last visit in June with me. Less edematous. Not sure what medications he is taking. Chief Complaint   Patient presents with    Dysphagia     Pt states that it is from the stroke that he had in 2020     Annual Wellness Visit        Prior to Admission medications    Medication Sig Start Date End Date Taking? Authorizing Provider   furosemide (LASIX) 20 mg tablet Take 1-2 Tablets by mouth daily. 8/4/21   Pako Prieto MD   ergocalciferol (ERGOCALCIFEROL) 1,250 mcg (50,000 unit) capsule Take 1 Capsule by mouth every seven (7) days. 6/21/21   Catherine Romero PA-C   donepeziL (ARICEPT) 5 mg tablet Take  by mouth nightly. Provider, Historical   cyanocobalamin 1,000 mcg tablet Take 1,000 mcg by mouth daily. Provider, Historical   hydroCHLOROthiazide (HYDRODIURIL) 12.5 mg tablet TAKE 1 TABLET BY MOUTH EVERY DAY 5/7/21   Catherine Romero PA-C   atorvastatin (LIPITOR) 80 mg tablet TAKE ONE TABLET ONCE DAILY 1/29/21   Pako Prieto MD   amLODIPine (NORVASC) 10 mg tablet TAKE ONE TABLET ONCE DAILY  (HIGH BLOOD PRESSURE) 1/29/21   Pako Prieto MD   carvediloL (COREG) 25 mg tablet Take 1 Tab by mouth two (2) times daily (with meals). 1/29/21   Pako Prieto MD   acetaminophen (TYLENOL) 500 mg tablet Take 1,000 mg by mouth daily as needed for Pain. Provider, Historical   aspirin 81 mg chewable tablet Take 1 Tab by mouth nightly.  3/10/17   Gregoria Daniels MD         Allergies   Allergen Reactions    Percocet [Oxycodone-Acetaminophen] Other LOV  7-8-2025   Change to:  Jardiance 25 mg daily  Lantus 65 units daily   Novolog 24 units with breakfast, 22 units with lunch, and 32 units with dinner plus 2:50 over 150  Ozempic 2 mg weekly     NOV  10-       Request for Lantus refill last given   isp Refills Start End     Lantus SoloStar 100 UNIT/ML pen-injector 30 mL 1 7/8/2025 --    Sig - Route: Inject 65 Units into the skin daily. Prime 2 units before each dose. - Subcutaneous       Decline      (comments)     \"feel weird\"         REVIEW OF SYSTEMS:  Per HPI    PHYSICAL EXAM:  Visit Vitals  /78   Pulse 76   Temp 98 °F (36.7 °C) (Oral)   Resp 16   Ht 5' 8\" (1.727 m)   Wt 208 lb 12.8 oz (94.7 kg)   SpO2 94%   BMI 31.75 kg/m²     Constitutional: Appears well-developed and well-nourished. No distress. HENT:   Head: Normocephalic and atraumatic. Eyes: No scleral icterus. Neck: no lad, no tm, supple   Cardiovascular: Normal S1/S2, regular rhythm. No murmurs, rubs, or gallops. Pulmonary/Chest: Effort normal and breath sounds normal. No respiratory distress. No wheezes, rhonchi, or rales. Ext: trace edema RLE, 1+ LLE. Neurological: Alert. Psychiatric: Normal mood and affect. Behavior is normal.     Lab Results   Component Value Date/Time    Sodium 145 (H) 06/18/2021 11:53 AM    Potassium 4.1 06/18/2021 11:53 AM    Chloride 110 (H) 06/18/2021 11:53 AM    CO2 23 06/18/2021 11:53 AM    Anion gap 6 05/28/2019 07:58 AM    Glucose 128 (H) 06/18/2021 11:53 AM    BUN 19 06/18/2021 11:53 AM    Creatinine 1.09 06/18/2021 11:53 AM    BUN/Creatinine ratio 17 06/18/2021 11:53 AM    GFR est AA 73 06/18/2021 11:53 AM    GFR est non-AA 63 06/18/2021 11:53 AM    Calcium 8.9 06/18/2021 11:53 AM    Bilirubin, total 0.5 06/18/2021 11:53 AM    Alk.  phosphatase 85 06/18/2021 11:53 AM    Protein, total 6.8 06/18/2021 11:53 AM    Albumin 4.1 06/18/2021 11:53 AM    Globulin 3.7 05/28/2019 07:58 AM    A-G Ratio 1.5 06/18/2021 11:53 AM    ALT (SGPT) 24 06/18/2021 11:53 AM     Lab Results   Component Value Date/Time    Hemoglobin A1c 6.1 (H) 06/18/2021 11:53 AM    Hemoglobin A1c 5.9 (H) 08/13/2018 01:35 PM    Hemoglobin A1c 5.6 09/18/2017 09:15 AM      Lab Results   Component Value Date/Time    Cholesterol, total 137 06/18/2021 11:53 AM    HDL Cholesterol 46 06/18/2021 11:53 AM    LDL, calculated 70 06/18/2021 11:53 AM    LDL, calculated 81 05/21/2019 10:49 AM    VLDL, calculated 21 06/18/2021 11:53 AM    VLDL, calculated 25 05/21/2019 10:49 AM    Triglyceride 118 06/18/2021 11:53 AM    CHOL/HDL Ratio 2.7 07/09/2010 09:43 AM          ASSESSMENT/PLAN  Diagnoses and all orders for this visit:    1. Medicare annual wellness visit, subsequent    2. Hyperlipidemia, mixed    3. Coronary artery disease involving native coronary artery of native heart without angina pectoris    4. History of CVA (cerebrovascular accident)    5. Benign hypertension with chronic kidney disease, stage III ([address]    6. Needs flu shot  -     FLU (FLUAD QUAD INFLUENZA VACCINE,QUAD,ADJUVANTED)      Patient is somewhat of a poor historian and does not know what medications he is taking. Blood pressure is controlled. No new weakness to suggest new CVA. Will obtain records from cardiologist including labs. There are no preventive care reminders to display for this patient. Follow-up and Dispositions    · Return in about 4 months (around 4/16/2022) for bp. Reviewed plan of care. Patient has provided input and agrees with goals. The nurse provided the patient and/or family with advanced directive information if needed and encouraged the patient to provide a copy to the office when available. This is the Subsequent Medicare Annual Wellness Exam, performed 12 months or more after the Initial AWV or the last Subsequent AWV    I have reviewed the patient's medical history in detail and updated the computerized patient record. Assessment/Plan   Education and counseling provided:  Are appropriate based on today's review and evaluation    1. Medicare annual wellness visit, subsequent  2. Hyperlipidemia, mixed  3. Coronary artery disease involving native coronary artery of native heart without angina pectoris  4. History of CVA (cerebrovascular accident)  5. Benign hypertension with chronic kidney disease, stage III ([address]  6.  Needs flu shot  -     FLU (FLUAD QUAD INFLUENZA VACCINE,QUAD,ADJUVANTED)       Depression Risk Factor Screening 3 most recent PHQ Screens 12/16/2021   Little interest or pleasure in doing things Not at all   Feeling down, depressed, irritable, or hopeless Several days   Total Score PHQ 2 1   Trouble falling or staying asleep, or sleeping too much -   Feeling tired or having little energy -   Poor appetite, weight loss, or overeating -   Feeling bad about yourself - or that you are a failure or have let yourself or your family down -   Trouble concentrating on things such as school, work, reading, or watching TV -   Moving or speaking so slowly that other people could have noticed; or the opposite being so fidgety that others notice -   Thoughts of being better off dead, or hurting yourself in some way -   PHQ 9 Score -   How difficult have these problems made it for you to do your work, take care of your home and get along with others -       Alcohol Risk Screen    Do you average more than 1 drink per night or more than 7 drinks a week: No    In the past three months have you have had more than 4 drinks containing alcohol on one occasion: No        Functional Ability and Level of Safety    Hearing: Hearing is good. Activities of Daily Living: The home contains: handrails and grab bars  Patient does total self care  Except dressing sometimes      Ambulation: with difficulty, uses a walker     Fall Risk:  Fall Risk Assessment, last 12 mths 12/16/2021   Able to walk? Yes   Fall in past 12 months? 0   Do you feel unsteady? 1   Are you worried about falling 1   Is TUG test greater than 12 seconds? 0   Is the gait abnormal? 0   Number of falls in past 12 months -   Fall with injury? -      Abuse Screen:  Patient is not abused       Cognitive Screening    Has your family/caregiver stated any concerns about your memory: no     Cognitive Screening: N/A    Health Maintenance Due     There are no preventive care reminders to display for this patient.     Patient Care Team   Patient Care Team:  Temi Eduardo MD as PCP - General (Internal Medicine)  Katia Parekh MD as PCP - [facility] Empaneled Provider  Jessica George DO as Consulting Provider (Ophthalmology)  Gonzales Melara MD as Consulting Provider (Orthopedic Surgery)    History     Patient Active Problem List   Diagnosis Code    Hyperlipidemia, mixed E78.2    Impotence N52.9    Diverticulosis K57.90    Hemorrhoids K64.9    Vitamin D deficiency E55.9    Benign hypertension with chronic kidney disease, stage III ([address] I12.9, N18.30    GERD (gastroesophageal reflux disease) K21.9    Intermittent angle-closure glaucoma H40.239    Coronary artery disease involving native coronary artery of native heart without angina pectoris I25.10    Nephrolithiasis N20.0    Left median nerve neuropathy G56.12    CKD (chronic kidney disease) stage 3, GFR 30-59 ml/min ([facility]) N18.30    Lumbar stenosis with neurogenic claudication M48.062    Unequal blood pressure in upper extremities R09.89    Glucose intolerance (impaired glucose tolerance) R73.02    Combined forms of age-related cataract of right eye H25.811    Combined forms of age-related cataract of left eye H25.812    Spinal stenosis M48.00    History of CVA (cerebrovascular accident) Z86.73     Past Medical History:   Diagnosis Date    A-fib ([address]     Arthritis     CAD (coronary artery disease) 12/24/2013    Cataract     Diverticulosis 9/22/2009    Dyslipidemia 9/22/2009    Hemorrhoids 9/22/2009    HTN 9/22/2009    Hypercholesterolemia     Impotence 9/22/2009    MI (myocardial infarction) ([address] 12/2013    CABG X 3     Nausea & vomiting     Neuropathy 9/22/2009    Open angle glaucoma with borderline intraocular pressure     Subarachnoid hemorrhage ([address] 3/2/2020    Vitamin D deficiency 9/22/2009      Past Surgical History:   Procedure Laterality Date    ENDOSCOPY, COLON, DIAGNOSTIC  398034    HX CATARACT REMOVAL Bilateral 09/13/2017    HX CORONARY ARTERY BYPASS GRAFT  12/2013    triple    HX HERNIA REPAIR Bilateral     HX LITHOTRIPSY  [id number]    HX ORTHOPAEDIC      back surgery    VT CARDIAC SURG PROCEDURE UNLIST      CABG X 3     Current Outpatient Medications   Medication Sig Dispense Refill    furosemide (LASIX) 20 mg tablet Take 1-2 Tablets by mouth daily. 30 Tablet 1    ergocalciferol (ERGOCALCIFEROL) 1,250 mcg (50,000 unit) capsule Take 1 Capsule by mouth every seven (7) days. 12 Capsule 1    donepeziL (ARICEPT) 5 mg tablet Take  by mouth nightly.  cyanocobalamin 1,000 mcg tablet Take 1,000 mcg by mouth daily.  hydroCHLOROthiazide (HYDRODIURIL) 12.5 mg tablet TAKE 1 TABLET BY MOUTH EVERY DAY 90 Tab 3    atorvastatin (LIPITOR) 80 mg tablet TAKE ONE TABLET ONCE DAILY 30 Tab 11    amLODIPine (NORVASC) 10 mg tablet TAKE ONE TABLET ONCE DAILY  (HIGH BLOOD PRESSURE) 30 Tab 11    carvediloL (COREG) 25 mg tablet Take 1 Tab by mouth two (2) times daily (with meals). 60 Tab 11    acetaminophen (TYLENOL) 500 mg tablet Take 1,000 mg by mouth daily as needed for Pain.  aspirin 81 mg chewable tablet Take 1 Tab by mouth nightly.  90 Tab 3     Allergies   Allergen Reactions    Percocet [Oxycodone-Acetaminophen] Other (comments)     \"feel weird\"       Family History   Problem Relation Age of Onset    Hypertension Mother     Diabetes Mother     Cancer Father         LIVER    Alcohol abuse Brother     Diabetes Sister     Hypertension Sister     OSTEOARTHRITIS Sister     Kidney Disease Sister         DIALYSIS    Alcohol abuse Brother     Suicide Brother     No Known Problems Brother     No Known Problems Brother     Dementia Brother     Hypertension Son     Hypertension Son     Anesth Problems Neg Hx      Social History     Tobacco Use    Smoking status: Never Smoker    Smokeless tobacco: Never Used   Substance Use Topics    Alcohol use: No         Flaca Coleman MD

## (undated) DEVICE — DRAPE XR C ARM 41X74IN LF --

## (undated) DEVICE — SOLUTION IV STRL H2O 500 ML AQUALITE POUR BTL

## (undated) DEVICE — BIPOLAR IRRIGATOR INTEGRATED TUBING AND BIPOLAR CORD SET, DISPOSABLE

## (undated) DEVICE — SYR 3ML LL TIP 1/10ML GRAD --

## (undated) DEVICE — SOLUTION IV 250ML 0.9% SOD CHL CLR INJ FLX BG CONT PRT CLSR

## (undated) DEVICE — EYE PADSSTERILENOT MADE WITH NATURAL RUBBER LATEXSINGLE USE ONLYDO NOT USE IF PACKAGE OPENED OR DAMAGED: Brand: CARDINAL HEALTH

## (undated) DEVICE — GAUZE SPONGES,12 PLY: Brand: CURITY

## (undated) DEVICE — THE MONARCH® "D" CARTRIDGE IS A SINGLE-USE POLYPROPYLENE CARTRIDGE FOR POSTERIOR CHAMBER IOL DELIVERY: Brand: MONARCH® III

## (undated) DEVICE — PILLOW POS AD L7IN R FOAM HD REST INTUB SLOT DISP

## (undated) DEVICE — KENDALL SCD EXPRESS SLEEVES, KNEE LENGTH, MEDIUM: Brand: KENDALL SCD

## (undated) DEVICE — SET 2ND L34IN N DEHP THE QUEENS MED CNTR VALUELINK

## (undated) DEVICE — SURGICAL PROCEDURE PACK CATRCT CUST

## (undated) DEVICE — LAMINECTOMY RICHMOND-LF: Brand: MEDLINE INDUSTRIES, INC.

## (undated) DEVICE — 3M™ TEGADERM™ TRANSPARENT FILM DRESSING FRAME STYLE, 1624W, 2-3/8 IN X 2-3/4 IN (6 CM X 7 CM), 100/CT 4CT/CASE: Brand: 3M™ TEGADERM™

## (undated) DEVICE — ZIP 8I SURGICAL SKIN CLOSURE DEVICE: Brand: ZIP 8I SURGICAL SKIN CLOSURE DEVICE

## (undated) DEVICE — KENDALL DL ECG CABLE AND LEAD WIRE SYSTEM, 3-LEAD, SINGLE PATIENT USE: Brand: KENDALL

## (undated) DEVICE — INFECTION CONTROL KIT SYS

## (undated) DEVICE — SYR 5ML 1/5 GRAD LL NSAF LF --

## (undated) DEVICE — NDL FLTR TIP 5 MIC 18GX1.5IN --

## (undated) DEVICE — SYRINGE INSULIN 1ML LUERSLIP TIP W/O SFTY U100 BLISTER PK

## (undated) DEVICE — PREP SKN PREVAIL 40ML APPL --

## (undated) DEVICE — SUTURE VCRL 2-0 L27IN ABSRB UD CP-2 L26MM 1/2 CIR REV CUT J869H

## (undated) DEVICE — TELFA NON-ADHERENT ABSORBENT DRESSING: Brand: TELFA

## (undated) DEVICE — (D)SYR 10ML 1/5ML GRAD NSAF -- PKGING CHANGE USE ITEM 338027

## (undated) DEVICE — Device

## (undated) DEVICE — STERILE POLYISOPRENE POWDER-FREE SURGICAL GLOVES WITH EMOLLIENT COATING: Brand: PROTEXIS

## (undated) DEVICE — STERILE POLYISOPRENE POWDER-FREE SURGICAL GLOVES: Brand: PROTEXIS

## (undated) DEVICE — COVER,MAYO STAND,STERILE: Brand: MEDLINE

## (undated) DEVICE — HIGH FLOW RATE EXTENSION SET, LUER LOCK ADAPTERS

## (undated) DEVICE — PREP KIT PEEL PTCH POVIDONE IOD

## (undated) DEVICE — SUTURE ABSRB BRAID COAT UD OS-6 NO 1 27IN VCRL J535H

## (undated) DEVICE — MASTISOL ADHESIVE LIQ 2/3ML

## (undated) DEVICE — 4-PORT MANIFOLD: Brand: NEPTUNE 2

## (undated) DEVICE — BONE WAX WHITE: Brand: BONE WAX WHITE